# Patient Record
Sex: FEMALE | Race: OTHER | HISPANIC OR LATINO | Employment: FULL TIME | URBAN - METROPOLITAN AREA
[De-identification: names, ages, dates, MRNs, and addresses within clinical notes are randomized per-mention and may not be internally consistent; named-entity substitution may affect disease eponyms.]

---

## 2017-08-04 VITALS
OXYGEN SATURATION: 100 % | DIASTOLIC BLOOD PRESSURE: 89 MMHG | RESPIRATION RATE: 18 BRPM | BODY MASS INDEX: 51.19 KG/M2 | HEART RATE: 101 BPM | TEMPERATURE: 98.5 F | SYSTOLIC BLOOD PRESSURE: 149 MMHG | WEIGHT: 289 LBS

## 2017-08-05 ENCOUNTER — HOSPITAL ENCOUNTER (EMERGENCY)
Facility: HOSPITAL | Age: 32
Discharge: HOME | End: 2017-08-05
Attending: EMERGENCY MEDICINE | Admitting: EMERGENCY MEDICINE
Payer: COMMERCIAL

## 2017-08-05 DIAGNOSIS — S61.419A LACERATION OF HAND: Primary | ICD-10-CM

## 2017-08-05 PROCEDURE — 99282 EMERGENCY DEPT VISIT SF MDM: CPT

## 2017-08-05 RX ORDER — LIDOCAINE HYDROCHLORIDE AND EPINEPHRINE 10; 10 MG/ML; UG/ML
INJECTION, SOLUTION INFILTRATION; PERINEURAL
Status: COMPLETED
Start: 2017-08-05 | End: 2017-08-05

## 2017-08-05 RX ORDER — LIDOCAINE HYDROCHLORIDE AND EPINEPHRINE 10; 10 MG/ML; UG/ML
20 INJECTION, SOLUTION INFILTRATION; PERINEURAL ONCE
Status: COMPLETED | OUTPATIENT
Start: 2017-08-05 | End: 2017-08-05

## 2017-08-05 RX ADMIN — LIDOCAINE HYDROCHLORIDE AND EPINEPHRINE 20 ML: 10; 10 INJECTION, SOLUTION INFILTRATION; PERINEURAL at 01:15

## 2017-08-05 RX ADMIN — Medication 1 APPLICATION: at 00:28

## 2017-08-05 RX ADMIN — LIDOCAINE HYDROCHLORIDE,EPINEPHRINE BITARTRATE 20 ML: 10; .01 INJECTION, SOLUTION INFILTRATION; PERINEURAL at 01:15

## 2017-10-20 ENCOUNTER — ALLSCRIPTS OFFICE VISIT (OUTPATIENT)
Dept: OTHER | Facility: OTHER | Age: 32
End: 2017-10-20

## 2017-10-20 DIAGNOSIS — R63.5 ABNORMAL WEIGHT GAIN: ICD-10-CM

## 2017-10-20 DIAGNOSIS — N92.0 EXCESSIVE AND FREQUENT MENSTRUATION WITH REGULAR CYCLE: ICD-10-CM

## 2017-10-21 NOTE — PROGRESS NOTES
Assessment  1  Weight gain (783 1) (R63 5)   2  Menorrhagia (626 2) (N92 0)   3  Moderate persistent asthma without complication (927 86) (A46 52)   4  Morbid obesity, unspecified obesity type (278 01) (E66 01)   5  Pap smear for cervical cancer screening (V76 2) (Z12 4)    Plan  Asthma in adult, mild persistent, with acute exacerbation    · Symbicort 160-4 5 MCG/ACT Inhalation Aerosol   · Breo Ellipta 200-25 MCG/INH Inhalation Aerosol Powder Breath Activated; INHALE 1 PUFFS  Daily   · Ventolin  (90 Base) MCG/ACT Inhalation Aerosol Solution; INHALE 1 TO 2 PUFFS  EVERY 4 TO 6 HOURS AS NEEDED  Depression    · PARoxetine HCl - 20 MG Oral Tablet  Menorrhagia    · (1) FSH; Status:Active; Requested for:20Oct2017;    · (1) HCG QUANT; Status:Active; Requested for:20Oct2017;    · (1) LH (LEUTINIZING HORMONE); Status:Active; Requested LKP:[id number];   Pap smear for cervical cancer screening    · *1 - [facility] [facility]-Management  *  Status: Hold For - Scheduling  Requested for:  [id number]  Care Summary provided  : Yes  Weight gain    · (1) HEMOGLOBIN A1C; Status:Active; Requested for:20Oct2017;    · (1) TSH WITH FT4 REFLEX; Status:Active; Requested CGB:[id number];    · *1 - [facility]  *  Status: Hold For - Scheduling  Requested for:  04VLY6308  Care Summary provided  : Yes    Discussion/Summary  Discussion Summary:   Weight gain on diet and working out plan was advised to keep a food diary and working out diary LH, A1c, TSH, pregnancy test clinic for pap smear consult per pt request   well controlled current meds   Paxil     Counseling Documentation With Imm: The patient was counseled regarding  Medication SE Review and Pt Understands Tx: Possible side effects of new medications were reviewed with the patient/guardian today  The treatment plan was reviewed with the patient/guardian   The patient/guardian understands and agrees with the treatment plan      Chief Complaint  Chief Complaint Chronic Condition [facility]: Patient is here today for follow up of chronic conditions described in HPI  History of Present Illness  HPI: Pt reports that she has gained a significant amount of weight since December  She reports that her diet has not changed and in fact she is trying to diet and eat healthy and has been working out consistently  She reports that even though with all the diet and working out she has still gained weight instead of losing weight  She came for evaluation today inorder to find out if she can have some weight loss medications  reported that she was prescribed Paxil for her anxiety however she was taking it intermittently instead of everyday as prescribed  Pt reports that her anxiety symptoms are well controlled and she was only using the medication when she felt that she needed it  Pt reports that it is well controlled  She states that she does not have to use her Ventolin as often now that she is on [facility] Based Practices Required Assessment:   Pain Assessment   the patient states they have pain  The pain is located in the menstrual cramps  (on a scale of 0 to 10, the patient rates the pain at 4 )    Prefered Language is  english  Primary Language is  english  Review of Systems  Complete-Female:   Constitutional: no fever,-- not feeling poorly,-- no chills-- and-- not feeling tired  Eyes: no eyesight problems,-- no dryness of the eyes,-- no purulent discharge from the eyes-- and-- no itching of the eyes  ENT: no sore throat,-- no nasal discharge-- and-- no hoarseness  Cardiovascular: no chest pain-- and-- no palpitations  Respiratory: no shortness of breath,-- no cough,-- no wheezing-- and-- no shortness of breath during exertion  Gastrointestinal: no abdominal pain,-- no nausea,-- no vomiting,-- no constipation,-- no diarrhea-- and-- no blood in stools     Genitourinary: cramping, but-- no dysuria,-- no pelvic pain,-- no incontinence-- and-- no dysmenorrhea  Neurological: no numbness,-- no tingling-- and-- no fainting  Psychiatric: no anxiety-- and-- no depression  Endocrine: polyuria and polydipsia  ROS Reviewed:   ROS reviewed  Active Problems  1  Anxiety (300 00) (F41 9)   2  Asthma (493 90) (J45 909)   3  Asthma in adult, mild persistent, with acute exacerbation (493 92) (J45 31)   4  Depression (311) (F32 9)   5  Fatigue (780 79) (R53 83)   6  Menorrhagia (626 2) (N92 0)   7  Moderate persistent asthma without complication (285 44) (U45 42)   8  Morbid obesity, unspecified obesity type (278 01) (E66 01)   9  Social problem (V62 9) (Z65 9)    Surgical History  Surgical History Reviewed: The surgical history was reviewed and updated today  Family History  Child    1  No pertinent family history  Family History Reviewed: The family history was reviewed and updated today  Social History   · Never a smoker   · Social problem (V62 9) (Z65 9)  Social History Reviewed: The social history was reviewed and updated today  The social history was reviewed and is unchanged  Current Meds   1  Breo Ellipta 200-25 MCG/INH Inhalation Aerosol Powder Breath Activated; INHALE 1 PUFFS Daily; Therapy: 82Qxa7137 to (Evaluate:56Vyw6366)  Requested for: 92YBU3091; Last Rx:03Jan2017   Ordered   2  PARoxetine HCl - 20 MG Oral Tablet; TAKE 1 TABLET DAILY; Therapy: 31AKE8505 to (Evaluate:29Apr2017)  Requested for: 23YNE5058; Last Rx:31Kls3893   Ordered   3  Symbicort 160-4 5 MCG/ACT Inhalation Aerosol; INHALE 2 PUFFS TWICE DAILY  RINSE MOUTH   AFTER USE; Therapy: 36XDJ5837 to (Evaluate:18Tzb6221)  Requested for: 50DYT4291; Last Rx:03Jan2017   Ordered   4  Ventolin  (90 Base) MCG/ACT Inhalation Aerosol Solution; INHALE 1 TO 2 PUFFS EVERY 4   TO 6 HOURS AS NEEDED; Therapy: 89TCY8589 to (Evaluate:27Jun2017)  Requested for: 29Nov2016; Last Rx:29Nov2016   Ordered  Medication List Reviewed:    The medication list was reviewed and updated today  Allergies  1  No Known Drug Allergies    Vitals  Vital Signs    Recorded: 94DKT8367 11:58AM   Temperature 98 2 F   Heart Rate 76   Systolic 116   Diastolic 80   Height 5 ft 3 in   Weight 291 lb 7 14 oz   BMI Calculated 51 63   BSA Calculated 2 27     Physical Exam    Constitutional   General appearance: No acute distress, well appearing and well nourished  Eyes   Conjunctiva and lids: No swelling, erythema or discharge  Ears, Nose, Mouth, and Throat   External inspection of ears and nose: Normal     Oropharynx: Normal with no erythema, edema, exudate or lesions  Pulmonary   Respiratory effort: No increased work of breathing or signs of respiratory distress  Auscultation of lungs: Clear to auscultation  Cardiovascular   Auscultation of heart: Normal rate and rhythm, normal S1 and S2, without murmurs  Abdomen   Abdomen: Non-tender, no masses  Musculoskeletal   Gait and station: Normal     Neurologic   Cranial nerves: Cranial nerves 2-12 intact  Psychiatric   Orientation to person, place, and time: Normal     Mood and affect: Normal          Attending Note  Attending Note: Attending Note: I interviewed and examined the patient,-- I discussed the case with the Resident and reviewed the Resident's note,-- I supervised the Resident-- and-- I agree with the Resident management plan as it was presented to me  Level of Participation: I was present in clinic and examined the patient  Patient's History: patient here with main concern being weight gain  states she is working out and following a diet but continues to gain weight  would like to try weight loss meds  getting  next month  did have tubal ligation , and took 2 pregnancy tests at home which were negative  Key Parts of the Exam: obese female in NAD  heart RRR lungs clear with no wheeze  Diagnosis and Plan: agree with rule out medical cause  told her goal to lose 1-2 lbs each month and continue exercise and diet   if predm or dm , may help to try metformin  would like to see endo  I agree with the Resident's note        Signatures   Electronically signed by : Donta Wolf DO; Oct 20 2017 12:40PM EST                       (Author)    Electronically signed by : Bev Rodriguez DO; Oct 20 2017 12:46PM EST                       (Co-participant)

## 2017-10-23 ENCOUNTER — TRANSCRIBE ORDERS (OUTPATIENT)
Dept: LAB | Facility: HOSPITAL | Age: 32
End: 2017-10-23

## 2017-10-23 ENCOUNTER — APPOINTMENT (OUTPATIENT)
Dept: LAB | Facility: HOSPITAL | Age: 32
End: 2017-10-23
Payer: COMMERCIAL

## 2017-10-23 DIAGNOSIS — R63.5 ABNORMAL WEIGHT GAIN: ICD-10-CM

## 2017-10-23 DIAGNOSIS — N92.0 EXCESSIVE AND FREQUENT MENSTRUATION WITH REGULAR CYCLE: ICD-10-CM

## 2017-10-23 LAB
B-HCG SERPL-ACNC: <2 MIU/ML
EST. AVERAGE GLUCOSE BLD GHB EST-MCNC: 123 MG/DL
FSH SERPL-ACNC: 2 MIU/ML
HBA1C MFR BLD: 5.9 % (ref 4.2–6.3)
LH SERPL-ACNC: 3.6 MIU/ML
TSH SERPL DL<=0.05 MIU/L-ACNC: 1.65 UIU/ML (ref 0.36–3.74)

## 2017-10-23 PROCEDURE — 83001 ASSAY OF GONADOTROPIN (FSH): CPT

## 2017-10-23 PROCEDURE — 83036 HEMOGLOBIN GLYCOSYLATED A1C: CPT

## 2017-10-23 PROCEDURE — 84702 CHORIONIC GONADOTROPIN TEST: CPT

## 2017-10-23 PROCEDURE — 36415 COLL VENOUS BLD VENIPUNCTURE: CPT

## 2017-10-23 PROCEDURE — 84443 ASSAY THYROID STIM HORMONE: CPT

## 2017-10-23 PROCEDURE — 83002 ASSAY OF GONADOTROPIN (LH): CPT

## 2018-01-13 VITALS
BODY MASS INDEX: 51.64 KG/M2 | SYSTOLIC BLOOD PRESSURE: 122 MMHG | DIASTOLIC BLOOD PRESSURE: 80 MMHG | TEMPERATURE: 98.2 F | WEIGHT: 291.45 LBS | HEART RATE: 76 BPM | HEIGHT: 63 IN

## 2018-01-16 NOTE — MISCELLANEOUS
Reason For Visit  Reason For Visit Free Text Note Form: Assistance with [address] referral and Community Resources     Case Management Documentation [facility]:   Information obtained from the patient and medical record  Other needs and concerns include psych and Anxiety  Patient's financial status employed  She is also dealing with additional issues such as lack of support, chronic/terminal disease and Asthma  Action Plan: [facility] [address]  plan reviewed  Progress Note  Soren met with this pt at MD request to assist with [address] referral and assistance with community resources  Pt has been recently hospitalized for exacerbation of her asthma and anxiety  Pt does relate great stress due to extreme financial constraints and housing concerns  Pt currently reside next to her grandparents who she provides support and assistance  In addition, she works full time and is sole support to for her 6 children ages 13,12,10,8,3,&2  Pt's does have her mother and an adult brother but they are also providing 25 assistance to the grandparents and can not provide housing  SOREN has provided pt resources for the [employer]  to assist with [address] services until she can obtain insurance  Pt is working with [employer] to reestablish her [place] coverage  SW has also reviewed some housing resources as pt relates she and her children will need to vacate her present apt in about 2 months  Info being sent to pt via the mail re: housing application and services from [facility]  Pt asked to reschedule with SOREN for follow up  Active Problems    1  Anxiety (300 00) (F41 9)   2  Asthma in adult, mild persistent, with acute exacerbation (493 92) (J45 31)   3  Morbid obesity, unspecified obesity type (278 01) (E66 01)   4  Social problem (V62 9) (Z65 9)   5  Uncomplicated severe persistent asthma (493 90) (J45 50)    Current Meds   1  Advair Diskus 250-50 MCG/DOSE Inhalation Aerosol Powder Breath Activated; INHALE 1   PUFF TWICE DAILY;    Therapy: 19JWV5655 to (Evaluate:49Ags6448)  Requested for: 30Jun2016; Last   Rx:30Jun2016 Ordered   2  Ventolin  (90 Base) MCG/ACT Inhalation Aerosol Solution; INHALE 1 TO 2 PUFFS   EVERY 4 TO 6 HOURS AS NEEDED; Therapy: 01FUS0752 to (Evaluate:26Jan2017)  Requested for: 05EIN3364; Last   Rx:30Jun2016 Ordered    Allergies    1   No Known Drug Allergies    Signatures   Electronically signed by : Kylie Jones LCSW; Jun 30 2016  1:07PM EST                       (Author)

## 2018-03-27 ENCOUNTER — OFFICE VISIT (OUTPATIENT)
Dept: INTERNAL MEDICINE CLINIC | Facility: CLINIC | Age: 33
End: 2018-03-27
Payer: COMMERCIAL

## 2018-03-27 VITALS
SYSTOLIC BLOOD PRESSURE: 130 MMHG | TEMPERATURE: 98.8 F | WEIGHT: 293 LBS | BODY MASS INDEX: 50.02 KG/M2 | DIASTOLIC BLOOD PRESSURE: 98 MMHG | HEIGHT: 64 IN | HEART RATE: 76 BPM

## 2018-03-27 DIAGNOSIS — E66.01 MORBID OBESITY DUE TO EXCESS CALORIES (HCC): Primary | ICD-10-CM

## 2018-03-27 DIAGNOSIS — R73.03 PREDIABETES: ICD-10-CM

## 2018-03-27 DIAGNOSIS — J45.20 MILD INTERMITTENT ASTHMA WITHOUT COMPLICATION: Chronic | ICD-10-CM

## 2018-03-27 PROCEDURE — 99213 OFFICE O/P EST LOW 20 MIN: CPT | Performed by: INTERNAL MEDICINE

## 2018-03-27 PROCEDURE — 3008F BODY MASS INDEX DOCD: CPT | Performed by: INTERNAL MEDICINE

## 2018-03-27 NOTE — LETTER
March 27, 2018     Patient: Katie Johnson   YOB: 1985   Date of Visit: 3/27/2018       To Whom it May Concern:    Jennifer Mitchell is under my professional care  She was seen in my office on 3/27/2018  She may return to work on 3/28/18  If you have any questions or concerns, please don't hesitate to call           Sincerely,          Garima Lester DO        CC: No Recipients

## 2018-05-17 ENCOUNTER — HOSPITAL ENCOUNTER (EMERGENCY)
Facility: HOSPITAL | Age: 33
Discharge: HOME | End: 2018-05-17
Attending: EMERGENCY MEDICINE
Payer: COMMERCIAL

## 2018-05-17 VITALS
SYSTOLIC BLOOD PRESSURE: 152 MMHG | HEART RATE: 80 BPM | WEIGHT: 260 LBS | TEMPERATURE: 98.1 F | OXYGEN SATURATION: 97 % | BODY MASS INDEX: 44.63 KG/M2 | DIASTOLIC BLOOD PRESSURE: 79 MMHG | RESPIRATION RATE: 20 BRPM

## 2018-05-17 DIAGNOSIS — J06.9 URI (UPPER RESPIRATORY INFECTION): Primary | ICD-10-CM

## 2018-05-17 PROCEDURE — 99283 EMERGENCY DEPT VISIT LOW MDM: CPT

## 2018-05-17 RX ORDER — ACETAMINOPHEN 325 MG/1
650 TABLET ORAL ONCE
Status: COMPLETED | OUTPATIENT
Start: 2018-05-17 | End: 2018-05-17

## 2018-05-17 RX ADMIN — ACETAMINOPHEN 650 MG: 325 TABLET, FILM COATED ORAL at 02:18

## 2018-05-17 RX ADMIN — DEXAMETHASONE SODIUM PHOSPHATE 10 MG: 10 INJECTION, SOLUTION INTRAMUSCULAR; INTRAVENOUS at 02:19

## 2018-05-17 RX ADMIN — LIDOCAINE HYDROCHLORIDE 10 ML: 20 SOLUTION ORAL; TOPICAL at 02:20

## 2018-05-17 NOTE — ED ATTENDING ATTESTATION
Angela LO DO, saw and evaluated the patient  I have discussed the patient with the resident/non-physician practitioner and agree with the resident's/non-physician practitioner's findings, Plan of Care, and MDM as documented in the resident's/non-physician practitioner's note, except where noted  All available labs and Radiology studies were reviewed  At this point I agree with the current assessment done in the Emergency Department  I have conducted an independent evaluation of this patient a history and physical is as follows:    20-year-old female presents with 2 days of URI symptoms  Patient complains of congestion, rhinorrhea, bilateral ear pain and sore throat  No fevers no known medical problems  On exam-no acute distress, TMs clear bilaterally, oropharynx mild erythema without exudates, heart regular, lungs clear    Plan-suspect viral URI, Flonase, Sudafed, Decadron    Critical Care Time  CritCare Time    Procedures

## 2018-05-17 NOTE — DISCHARGE INSTRUCTIONS
Pharyngitis   WHAT YOU NEED TO KNOW:   Pharyngitis, or sore throat, is inflammation of the tissues and structures in your pharynx (throat)  Pharyngitis is most often caused by bacteria  It may also be caused by a cold or flu virus  Other causes include smoking, allergies, or acid reflux  DISCHARGE INSTRUCTIONS:   Call 911 for any of the following:   · You have trouble breathing or swallowing because your throat is swollen or sore  Return to the emergency department if:   · You are drooling because it hurts too much to swallow  · Your fever is higher than 102? F (39?C) or lasts longer than 3 days  · You are confused  · You taste blood in your throat  Contact your healthcare provider if:   · Your throat pain gets worse  · You have a painful lump in your throat that does not go away after 5 days  · Your symptoms do not improve after 5 days  · You have questions or concerns about your condition or care  Medicines:  Viral pharyngitis will go away on its own without treatment  Your sore throat should start to feel better in 3 to 5 days for both viral and bacterial infections  You may need any of the following:  · NSAIDs , such as ibuprofen, help decrease swelling, pain, and fever  NSAIDs can cause stomach bleeding or kidney problems in certain people  If you take blood thinner medicine, always ask your healthcare provider if NSAIDs are safe for you  Always read the medicine label and follow directions  · Acetaminophen  decreases pain and fever  It is available without a doctor's order  Ask how much to take and how often to take it  Follow directions  Acetaminophen can cause liver damage if not taken correctly  · Take your medicine as directed  Contact your healthcare provider if you think your medicine is not helping or if you have side effects  Tell him or her if you are allergic to any medicine  Keep a list of the medicines, vitamins, and herbs you take   Include the amounts, and when and why you take them  Bring the list or the pill bottles to follow-up visits  Carry your medicine list with you in case of an emergency  Manage your symptoms:   · Gargle salt water  Mix ¼ teaspoon salt in an 8 ounce glass of warm water and gargle  This may help decrease swelling in your throat  · Drink liquids as directed  You may need to drink more liquids than usual  Liquids may help soothe your throat and prevent dehydration  Ask how much liquid to drink each day and which liquids are best for you  · Use a cool-steam humidifier  to help moisten the air in your room and calm your cough  · Soothe your throat  with cough drops, ice, soft foods, or popsicles  Prevent the spread of pharyngitis:  Cover your mouth and nose when you cough or sneeze  Do not share food or drinks  Wash your hands often  Use soap and water  If soap and water are unavailable, use an alcohol based hand   Follow up with your healthcare provider as directed:  Write down your questions so you remember to ask them during your visits  © 2017 [employer] Information is for End User's use only and may not be sold, redistributed or otherwise used for commercial purposes  All illustrations and images included in CareNotes® are the copyrighted property of [employer]  or [employer]  The above information is an  only  It is not intended as medical advice for individual conditions or treatments  Talk to your doctor, nurse or pharmacist before following any medical regimen to see if it is safe and effective for you

## 2018-05-17 NOTE — ED PROVIDER NOTES
History  Chief Complaint   Patient presents with    Earache     pt c/o bilateral earache into neck since last night      59-year-old female presenting for evaluation of 2 days of URI symptoms  Patient complains of congestion, rhinorrhea, bilateral ear pain, sore throat and cough  She reports that her sore throat is what is bothering her the most   She has been able to tolerate po  Pain with swallowing, no difficulty swallowing  Reports a cough, occasionally productive of yellow/green sputum  Denies any fevers, neck pain/stiffness, abdominal pain, nausea, vomiting, changes in stool or urinary complaints  No known sick contacts  History of asthma  Nonsmoker  A/P:  59-year-old female with URI, symptomatic treatment, PCP follow-up            Prior to Admission Medications   Prescriptions Last Dose Informant Patient Reported? Taking? BREO ELLIPTA 200-25 MCG/INH inhaler 2018 at Unknown time  No Yes   Sig: Inhale 1 puff daily   VENTOLIN  (90 Base) MCG/ACT inhaler 2018 at Unknown time  No Yes   Sig: Inhale 2 puffs every 4 (four) hours as needed for wheezing   metFORMIN (GLUCOPHAGE) 500 mg tablet 2018 at Unknown time  No Yes   Sig: Take 1 tablet (500 mg total) by mouth 2 (two) times a day with meals      Facility-Administered Medications: None       Past Medical History:   Diagnosis Date    Asthma        Past Surgical History:   Procedure Laterality Date     SECTION         Family History   Problem Relation Age of Onset    No Known Problems Other     Arthritis Mother     Fibromyalgia Mother     Diabetes Family     Cancer Family     Heart disease Family      I have reviewed and agree with the history as documented  Social History   Substance Use Topics    Smoking status: Never Smoker    Smokeless tobacco: Never Used    Alcohol use No        Review of Systems   Constitutional: Negative for chills, fever and unexpected weight change     HENT: Positive for congestion, ear pain, rhinorrhea, sinus pressure and sore throat  Eyes: Negative for pain and visual disturbance  Respiratory: Positive for cough  Negative for shortness of breath  Cardiovascular: Negative for chest pain and leg swelling  Gastrointestinal: Negative for abdominal pain, constipation, diarrhea, nausea and vomiting  Endocrine: Negative for polydipsia, polyphagia and polyuria  Genitourinary: Negative for dysuria, frequency, hematuria and urgency  Musculoskeletal: Negative for back pain, myalgias and neck pain  Skin: Negative for color change and rash  Allergic/Immunologic: Negative for environmental allergies and immunocompromised state  Neurological: Negative for dizziness, weakness, light-headedness, numbness and headaches  Hematological: Negative for adenopathy  Does not bruise/bleed easily  Psychiatric/Behavioral: Negative for agitation and confusion  All other systems reviewed and are negative  Physical Exam  ED Triage Vitals [05/17/18 0159]   Temperature Pulse Respirations Blood Pressure SpO2   98 1 °F (36 7 °C) 80 20 152/79 97 %      Temp Source Heart Rate Source Patient Position - Orthostatic VS BP Location FiO2 (%)   Tympanic Monitor Sitting Left arm --      Pain Score       Worst Possible Pain           Orthostatic Vital Signs  Vitals:    05/17/18 0159   BP: 152/79   Pulse: 80   Patient Position - Orthostatic VS: Sitting       Physical Exam   Constitutional: She is oriented to person, place, and time  She appears well-developed and well-nourished  HENT:   Head: Normocephalic and atraumatic  Right Ear: Tympanic membrane and external ear normal    Left Ear: Tympanic membrane and external ear normal    Nose: Mucosal edema and rhinorrhea present  Mouth/Throat: Uvula is midline, oropharynx is clear and moist and mucous membranes are normal  No oral lesions  No trismus in the jaw  No dental abscesses or uvula swelling   No oropharyngeal exudate, posterior oropharyngeal edema, posterior oropharyngeal erythema or tonsillar abscesses  Eyes: Conjunctivae and EOM are normal    Neck: Normal range of motion  Neck supple  Cardiovascular: Normal rate, regular rhythm, normal heart sounds and intact distal pulses  Pulmonary/Chest: Effort normal and breath sounds normal  No respiratory distress  She has no wheezes  She has no rales  She exhibits no tenderness  Abdominal: Soft  She exhibits no distension  There is no tenderness  There is no rebound and no guarding  Musculoskeletal: She exhibits no edema or deformity  Neurological: She is alert and oriented to person, place, and time  She exhibits normal muscle tone  Coordination normal    Skin: Skin is warm and dry  No rash noted  Psychiatric: She has a normal mood and affect  Judgment and thought content normal    Nursing note and vitals reviewed  ED Medications  Medications   dexamethasone 10 mg/mL oral liquid 10 mg 1 mL (10 mg Oral Given 5/17/18 0219)   acetaminophen (TYLENOL) tablet 650 mg (650 mg Oral Given 5/17/18 0218)   lidocaine viscous (XYLOCAINE) 2 % mucosal solution 10 mL (10 mL Swish & Swallow Given 5/17/18 0220)       Diagnostic Studies  Results Reviewed     None                 No orders to display         Procedures  Procedures      Phone Consults  ED Phone Contact    ED Course                               MDM  Number of Diagnoses or Management Options  URI (upper respiratory infection):   Diagnosis management comments: 27 yo F with URI, symptomatic tx    CritCare Time    Disposition  Final diagnoses:   URI (upper respiratory infection)     Time reflects when diagnosis was documented in both MDM as applicable and the Disposition within this note     Time User Action Codes Description Comment    5/17/2018  2:14 AM Glynn ENRIQUEZ Add [J06 9] URI (upper respiratory infection)       ED Disposition     ED Disposition Condition Comment    Discharge  [facility] to [facility]/self care      Condition at discharge: Stable        Follow-up Information     Follow up With Specialties Details Why [address],  Internal Medicine   [address] [facility] [address]  [phone]          Take 400 mg Ibuprofen every 6 hours and 650 mg Tylenol every 8 hours  Return to ED if you have any new/worsening symptoms        Discharge Medication List as of 5/17/2018  2:15 AM      CONTINUE these medications which have NOT CHANGED    Details   BREO ELLIPTA 200-25 MCG/INH inhaler Inhale 1 puff daily, Starting Tue 3/27/2018, Normal      metFORMIN (GLUCOPHAGE) 500 mg tablet Take 1 tablet (500 mg total) by mouth 2 (two) times a day with meals, Starting Tue 3/27/2018, Normal      VENTOLIN  (90 Base) MCG/ACT inhaler Inhale 2 puffs every 4 (four) hours as needed for wheezing, Starting Tue 3/27/2018, Normal           No discharge procedures on file  ED Provider  Attending physically available and evaluated Zunilda Butlerkyra LO managed the patient along with the ED Attending      Electronically Signed by         Kelly Wray DO  05/18/18 9306

## 2018-05-23 ENCOUNTER — OFFICE VISIT (OUTPATIENT)
Dept: OBGYN CLINIC | Facility: CLINIC | Age: 33
End: 2018-05-23
Payer: COMMERCIAL

## 2018-05-23 VITALS
SYSTOLIC BLOOD PRESSURE: 128 MMHG | BODY MASS INDEX: 51.91 KG/M2 | DIASTOLIC BLOOD PRESSURE: 74 MMHG | HEIGHT: 63 IN | WEIGHT: 293 LBS

## 2018-05-23 DIAGNOSIS — Z11.3 SCREENING FOR STD (SEXUALLY TRANSMITTED DISEASE): ICD-10-CM

## 2018-05-23 DIAGNOSIS — N92.6 IRREGULAR MENSTRUAL CYCLE: ICD-10-CM

## 2018-05-23 DIAGNOSIS — Z01.419 ENCOUNTER FOR GYNECOLOGICAL EXAMINATION WITHOUT ABNORMAL FINDING: Primary | ICD-10-CM

## 2018-05-23 PROCEDURE — 87491 CHLMYD TRACH DNA AMP PROBE: CPT | Performed by: NURSE PRACTITIONER

## 2018-05-23 PROCEDURE — 99385 PREV VISIT NEW AGE 18-39: CPT | Performed by: NURSE PRACTITIONER

## 2018-05-23 PROCEDURE — G0145 SCR C/V CYTO,THINLAYER,RESCR: HCPCS | Performed by: NURSE PRACTITIONER

## 2018-05-23 PROCEDURE — 87624 HPV HI-RISK TYP POOLED RSLT: CPT | Performed by: NURSE PRACTITIONER

## 2018-05-23 PROCEDURE — 87591 N.GONORRHOEAE DNA AMP PROB: CPT | Performed by: NURSE PRACTITIONER

## 2018-05-23 NOTE — PROGRESS NOTES
Assessment / Plan    1  Encounter for gynecological examination without abnormal finding  Normal well woman exam  Pap with hpv obtained    - Liquid-based pap, screening    2  Screening for STD (sexually transmitted disease)  Per patient request    - Chlamydia/GC amplified DNA by PCR    3  Irregular menstrual cycle  Explained that Q 1-2 months, while irregular, is not extreme enough to warrant management  Discussed impact of excess weight on menstrual cycles  Recommend Weight Watcher's  RV as needed if cycles become progressively more irregular  Andre Evans is a 28 y o  female who presents for her annual gynecologic exam   NEW PATIENT    Patient with morbid obesity  10/2017 LH 3 6, FSH 2 0, hcg negative, TSH normal  BC: tubal ligation    Last pap: ~ 6 years ago; no h/o abnormal  h/o twins , spontaneous, no fertility treatment    Every 1-2 month periods; started to be irregular 6-7 months ago   + dysmenorrhea  Duration 5 days; heavy x 3 days  Current contraception: tubal ligation  History of abnormal Pap smear: no  Family history of breast,uterine, ovarian or colon cancer: yes - mgm breast cancer    Taking metformin for pre-diabetes  Walking one mile a day  c/o hair loss      Menstrual History:  OB History      Para Term  AB Living    7 6 6   1 7    SAB TAB Ectopic Multiple Live Births    1     1           Menarche age: 6  Patient's last menstrual period was 2018  Period Pattern: (!) Irregular  Menstrual Flow: Heavy    The following portions of the patient's history were reviewed and updated as appropriate: allergies, current medications, past family history, past medical history, past social history, past surgical history and problem list     Review of Systems      Review of Systems   Constitutional: Negative for chills and fever     Gastrointestinal: Negative for abdominal distention, abdominal pain, blood in stool, constipation, diarrhea, nausea and vomiting  Genitourinary: Positive for menstrual problem (irregular cycles)  Negative for difficulty urinating, dysuria, frequency, genital sores, hematuria, pelvic pain, urgency, vaginal bleeding and vaginal discharge  Breasts:  Negative for skin changes, dimpling, asymmetry, nipple discharge, redness, tenderness or palpable masses    Objective      /74   Ht 5' 3" (1 6 m)   Wt (!) 137 kg (301 lb 6 4 oz)   LMP 05/16/2018   BMI 53 39 kg/m²      Physical Exam   Constitutional: She appears well-developed and well-nourished  No distress  Neck: Neck supple  No thyromegaly present  Pulmonary/Chest: Right breast exhibits no inverted nipple, no mass, no nipple discharge, no skin change and no tenderness  Left breast exhibits no inverted nipple, no mass, no nipple discharge, no skin change and no tenderness  Breasts are symmetrical    Abdominal: Soft  Normal appearance  She exhibits no mass  There is no tenderness  There is no CVA tenderness  Genitourinary: Rectum normal and uterus normal  Rectal exam shows guaiac negative stool  No labial fusion  There is no rash, tenderness, lesion or injury on the right labia  There is no rash, tenderness, lesion or injury on the left labia  Uterus is not enlarged and not tender  Cervix exhibits no motion tenderness, no discharge and no friability  Right adnexum displays no mass, no tenderness and no fullness  Left adnexum displays no mass, no tenderness and no fullness  No erythema, tenderness or bleeding in the vagina  No foreign body in the vagina  No signs of injury around the vagina  No vaginal discharge found  Genitourinary Comments:   Exam limited by habitus     Lymphadenopathy:     She has no cervical adenopathy  She has no axillary adenopathy  Right: No inguinal and no supraclavicular adenopathy present  Left: No inguinal and no supraclavicular adenopathy present  Neurological: She is alert  She is not disoriented     Skin: Skin is warm, dry and intact  Psychiatric: She has a normal mood and affect   Her behavior is normal

## 2018-05-23 NOTE — PATIENT INSTRUCTIONS
Follow up with family doctor  Ask her about endocrinology consult  For now recommend Weight Watchers

## 2018-05-24 LAB
CHLAMYDIA DNA CVX QL NAA+PROBE: NORMAL
N GONORRHOEA DNA GENITAL QL NAA+PROBE: NORMAL

## 2018-05-29 LAB
HPV RRNA GENITAL QL NAA+PROBE: NORMAL
LAB AP GYN PRIMARY INTERPRETATION: NORMAL
Lab: NORMAL

## 2018-07-29 ENCOUNTER — APPOINTMENT (EMERGENCY)
Dept: RADIOLOGY | Facility: HOSPITAL | Age: 33
End: 2018-07-29
Payer: COMMERCIAL

## 2018-07-29 ENCOUNTER — HOSPITAL ENCOUNTER (EMERGENCY)
Facility: HOSPITAL | Age: 33
Discharge: HOME | End: 2018-07-30
Attending: EMERGENCY MEDICINE | Admitting: EMERGENCY MEDICINE
Payer: COMMERCIAL

## 2018-07-29 VITALS
WEIGHT: 260 LBS | TEMPERATURE: 98 F | HEART RATE: 84 BPM | OXYGEN SATURATION: 97 % | DIASTOLIC BLOOD PRESSURE: 73 MMHG | RESPIRATION RATE: 18 BRPM | SYSTOLIC BLOOD PRESSURE: 129 MMHG | BODY MASS INDEX: 46.06 KG/M2

## 2018-07-29 DIAGNOSIS — M25.561 RIGHT KNEE PAIN: Primary | ICD-10-CM

## 2018-07-29 PROCEDURE — 73564 X-RAY EXAM KNEE 4 OR MORE: CPT

## 2018-07-29 RX ORDER — ACETAMINOPHEN 325 MG/1
975 TABLET ORAL ONCE
Status: COMPLETED | OUTPATIENT
Start: 2018-07-29 | End: 2018-07-29

## 2018-07-29 RX ADMIN — ACETAMINOPHEN 975 MG: 325 TABLET, FILM COATED ORAL at 23:58

## 2018-07-30 PROCEDURE — 99283 EMERGENCY DEPT VISIT LOW MDM: CPT

## 2018-07-30 RX ORDER — NAPROXEN 500 MG/1
500 TABLET ORAL 2 TIMES DAILY WITH MEALS
Qty: 14 TABLET | Refills: 0 | Status: SHIPPED | OUTPATIENT
Start: 2018-07-30 | End: 2021-03-23

## 2018-07-30 NOTE — ED PROVIDER NOTES
History  Chief Complaint   Patient presents with    Knee Injury     pt states that yesterday she twisted her knee and now she can barely put weight on it  71-year-old previously healthy female presents for evaluation of right knee injury  Patient states that she was getting out of bed earlier today when she twisted her right knee  She felt immediate pain to her anterior right knee below her patella, she states that she had difficulty ambulating on the knee secondary to pain  The pain was nonradiating and without any associated numbness or tingling in her extremity  No similar injuries in the past   She took 3 Advils without any relief  Prior to Admission Medications   Prescriptions Last Dose Informant Patient Reported? Taking? BREO ELLIPTA 200-25 MCG/INH inhaler   No Yes   Sig: Inhale 1 puff daily   VENTOLIN  (90 Base) MCG/ACT inhaler   No Yes   Sig: Inhale 2 puffs every 4 (four) hours as needed for wheezing   metFORMIN (GLUCOPHAGE) 500 mg tablet   No Yes   Sig: Take 1 tablet (500 mg total) by mouth 2 (two) times a day with meals      Facility-Administered Medications: None       Past Medical History:   Diagnosis Date    Asthma        Past Surgical History:   Procedure Laterality Date     SECTION         Family History   Problem Relation Age of Onset    No Known Problems Other     Arthritis Mother     Fibromyalgia Mother     Diabetes Family     Cancer Family     Heart disease Family      I have reviewed and agree with the history as documented  Social History   Substance Use Topics    Smoking status: Never Smoker    Smokeless tobacco: Never Used    Alcohol use No        Review of Systems   Constitutional: Negative for chills and fever  HENT: Negative for rhinorrhea and sore throat  Respiratory: Negative for cough  Cardiovascular: Negative for chest pain and palpitations  Gastrointestinal: Negative for abdominal pain, nausea and vomiting     Genitourinary: Negative for dysuria, frequency and urgency  Musculoskeletal:        Right knee pain   Neurological: Negative for weakness, light-headedness and headaches  Physical Exam  ED Triage Vitals [07/29/18 2230]   Temperature Pulse Respirations Blood Pressure SpO2   98 °F (36 7 °C) 84 18 129/73 97 %      Temp Source Heart Rate Source Patient Position - Orthostatic VS BP Location FiO2 (%)   Oral Monitor Lying Right arm --      Pain Score       8           Orthostatic Vital Signs  Vitals:    07/29/18 2230   BP: 129/73   Pulse: 84   Patient Position - Orthostatic VS: Lying       Physical Exam   Constitutional: She is oriented to person, place, and time  She appears well-developed and well-nourished  Obese female in no acute distress   HENT:   Head: Normocephalic and atraumatic  Cardiovascular: Normal rate and regular rhythm  Exam reveals no gallop and no friction rub  No murmur heard  Pulmonary/Chest: Effort normal  She has no wheezes  She has no rales  She exhibits no tenderness  Abdominal: Soft  She exhibits no distension and no mass  There is no rebound and no guarding  Musculoskeletal:   Tenderness to palpation in the anterior knee, below the patella, no obvious swelling, the exam is limited secondary to patient's habitus  Range of motion decreased secondary to pain especially when flexing the knee, distally extremities neurovascularly intact  No obvious ligamentous laxity on exam   Neurological: She is alert and oriented to person, place, and time  Skin: Skin is warm and dry  Psychiatric: She has a normal mood and affect  Nursing note and vitals reviewed        ED Medications  Medications   acetaminophen (TYLENOL) tablet 975 mg (975 mg Oral Given 7/29/18 0336)       Diagnostic Studies  Results Reviewed     None                 XR knee 4+ vw right injury   ED Interpretation by Daren Mayorga MD (07/30 9587)   No fracture, unknown metallic foreign body noted on x-ray Procedures  Procedures      Phone Consults  ED Phone Contact    ED Course  ED Course as of Jul 30 0135   Mon Jul 30, 2018   0026 The was not abnormality on the patient's x-ray there appears to be a metallic foreign body, there is no history concerning for foreign body in the region, there is no overlying skin defects, patient will follow up with primary care and Orthopedics for further care                                [facility]  Number of Diagnoses or Management Options  Diagnosis management comments: 26-year-old female presents for evaluation of right knee pain, will obtain x-rays, will treat symptomatically with Tylenol and knee immobilizer, crutches, patient will follow up with Orthopedics for further care    CritCare Time    Disposition  Final diagnoses:   Right knee pain     Time reflects when diagnosis was documented in both MDM as applicable and the Disposition within this note     Time User Action Codes Description Comment    7/30/2018 12:25 AM Fabby Arriola Add [M25 561] Right knee pain       ED Disposition     ED Disposition Condition Comment    Discharge  [address] discharge to home/self care      Condition at discharge: Stable        Follow-up Information     Follow up With Specialties Details Why Contact Info Additional [address] Emergency Department Emergency Medicine  If symptoms worsen [address]  [phone]  ED, [address] [place] [address], [address] [place] [address] Ave [address], DO Internal Medicine Schedule an appointment as soon as possible for a visit  [address] Connie [address] 26 Surgery In 1 week  [address] [id number] [phone]           Discharge Medication List as of 7/30/2018 12:26 AM      START taking these medications    Details   naproxen (NAPROSYN) 500 mg tablet Take 1 tablet (500 mg total) by mouth 2 (two) times a day with meals, Starting Mon 7/30/2018, Print         CONTINUE these medications which have NOT CHANGED    Details   BREO ELLIPTA 200-25 MCG/INH inhaler Inhale 1 puff daily, Starting Tue 3/27/2018, Normal      metFORMIN (GLUCOPHAGE) 500 mg tablet Take 1 tablet (500 mg total) by mouth 2 (two) times a day with meals, Starting Tue 3/27/2018, Normal      VENTOLIN  (90 Base) MCG/ACT inhaler Inhale 2 puffs every 4 (four) hours as needed for wheezing, Starting Tue 3/27/2018, Normal           No discharge procedures on file  ED Provider  Attending physically available and evaluated [place] Pio  I managed the patient along with the ED Attending      Electronically Signed by         Juana White MD  07/30/18 8792

## 2018-07-30 NOTE — ED ATTENDING ATTESTATION
Cynthia Pillai MD, saw and evaluated the patient  I have discussed the patient with the resident/non-physician practitioner and agree with the resident's/non-physician practitioner's findings, Plan of Care, and MDM as documented in the resident's/non-physician practitioner's note, except where noted  All available labs and Radiology studies were reviewed  At this point I agree with the current assessment done in the Emergency Department  I have conducted an independent evaluation of this patient including a focused history of:    Emergency Department Note- Rustam Garland 28 y o  female MRN: [id number]    Unit/Bed#: ED 24 Encounter: [id number]    Rustam Garland is a 28 y o  female who presents with   Chief Complaint   Patient presents with    Knee Injury     pt states that yesterday she twisted her knee and now she can barely put weight on it  History of Present Illness   HPI:  Rustam Garland is a 28 y o  female who presents for evaluation of:  Acute lateral right knee pain  Patient twisted knee after getting out of bed  Review of Systems   Constitutional: Negative for fatigue and fever  Musculoskeletal: Negative for gait problem and joint swelling  All other systems reviewed and are negative        Historical Information   Past Medical History:   Diagnosis Date    Asthma      Past Surgical History:   Procedure Laterality Date     SECTION       Social History   History   Alcohol Use No     History   Drug Use No     History   Smoking Status    Never Smoker   Smokeless Tobacco    Never Used     Family History: non-contributory    Meds/Allergies   all medications and allergies reviewed  No Known Allergies    Objective   First Vitals:   Blood Pressure: 129/73 (18)  Pulse: 84 (18)  Temperature: 98 °F (36 7 °C) (18)  Temp Source: Oral (18)  Respirations: 18 (18)  Weight - Scale: 118 kg (260 lb) (18 )  SpO2: 97 % (18)    Current Vitals:   Blood Pressure: 129/73 (18)  Pulse: 84 (18)  Temperature: 98 °F (36 7 °C) (18)  Temp Source: Oral (18)  Respirations: 18 (18)  Weight - Scale: 118 kg (260 lb) (18)  SpO2: 97 % (18)    No intake or output data in the 24 hours ending 18    Invasive Devices          No matching active lines, drains, or airways          Physical Exam   Constitutional: She is oriented to person, place, and time  She appears well-developed and well-nourished  HENT:   Head: Normocephalic and atraumatic  Pulmonary/Chest: Effort normal and breath sounds normal    Abdominal: Soft  Bowel sounds are normal    Musculoskeletal: She exhibits tenderness  She exhibits no edema or deformity  Tender right lateral knee   Neurological: She is alert and oriented to person, place, and time  Skin: Skin is warm and dry  Psychiatric: She has a normal mood and affect  Her behavior is normal  Judgment and thought content normal    Nursing note and vitals reviewed  Medical Decision Makin  Acute right knee pain xray r/o fx    No results found for this or any previous visit (from the past 36 hour(s))  XR knee 4+ vw right injury    (Results Pending)         Portions of the record may have been created with voice recognition software  Occasional wrong word or "sound a like" substitutions may have occurred due to the inherent limitations of voice recognition software  Read the chart carefully and recognize, using context, where substitutions have occurred

## 2018-07-30 NOTE — DISCHARGE INSTRUCTIONS
Please follow-up with the primary care provider and Orthopedics for further care, if symptoms worsen please return to the emergency department  Knee Pain   WHAT YOU NEED TO KNOW:   Knee pain may start suddenly, or it may be a long-term problem  You may have pain on the side, front, or back of your knee  You may have knee stiffness and swelling  You may hear popping sounds or feel like your knee is giving way or locking up as you walk  You may feel pain when you sit, stand, walk, or climb up and down stairs  Knee pain can be caused by conditions such as obesity, inflammation, or strains or tears in ligaments or tendons  DISCHARGE INSTRUCTIONS:   Follow up with your healthcare provider within 24 hours or as directed: You may need follow-up treatments, such as steroid injections to decrease pain  Write down your questions so you remember to ask them during your visits  Self-care:   · Rest  your knee so it can heal  Limit activities that increase your pain  · Ice  can help reduce swelling  Wrap ice in a towel and put it on your knee for as long and as often as directed  · Compression  with a brace or bandage can help reduce swelling  Use a brace or bandage only as directed  · Elevation  helps decrease pain and swelling  Elevate your knee while you are sitting or lying down  Prop your leg on pillows to keep your knee above the level of your heart  Medicines:   · NSAIDs  help decrease swelling and pain or fever  This medicine is available with or without a doctor's order  NSAIDs can cause stomach bleeding or kidney problems in certain people  If you take blood thinner medicine, always ask your healthcare provider if NSAIDs are safe for you  Always read the medicine label and follow directions  · Acetaminophen  decreases pain and fever  It is available without a doctor's order  Ask how much to take and when to take it  Follow directions  Acetaminophen can cause liver damage if not taken correctly  · Take your medicine as directed  Contact your healthcare provider if you think your medicine is not helping or if you have side effects  Tell him or her if you are allergic to any medicine  Keep a list of the medicines, vitamins, and herbs you take  Include the amounts, and when and why you take them  Bring the list or the pill bottles to follow-up visits  Carry your medicine list with you in case of an emergency  Exercise as directed: You may need to see a physical therapist or do recommended exercises to improve movement and decrease your pain  You may be directed to walk, swim, or ride a bike  Follow your exercise plan exactly as directed to avoid further injury  Contact your healthcare provider if:   · You have questions or concerns about your condition or care  Return to the emergency department if:   · Your pain is worse, even after treatment  · You cannot bend or straighten your leg completely  · The swelling around your knee does not go down even with treatment  · Your knee is painful and hot to the touch  © 2017 [address] Information is for End User's use only and may not be sold, redistributed or otherwise used for commercial purposes  All illustrations and images included in CareNotes® are the copyrighted property of A "[employer] A M , Inc  or Israel Burton  The above information is an  only  It is not intended as medical advice for individual conditions or treatments  Talk to your doctor, nurse or pharmacist before following any medical regimen to see if it is safe and effective for you

## 2018-11-06 DIAGNOSIS — R73.03 PREDIABETES: ICD-10-CM

## 2018-12-28 DIAGNOSIS — R73.03 PREDIABETES: ICD-10-CM

## 2019-01-17 DIAGNOSIS — J45.20 MILD INTERMITTENT ASTHMA WITHOUT COMPLICATION: Chronic | ICD-10-CM

## 2019-01-17 NOTE — TELEPHONE ENCOUNTER
Refilled  Please ask the patient to make an appointment for March 2019  She has not been seen in almost a year

## 2019-01-17 NOTE — TELEPHONE ENCOUNTER
Pt is almost out  Please send refills asap   Pt is scheduled to see you on 3/12/19 (soonest available)

## 2019-05-15 NOTE — PROGRESS NOTES
INTERNAL MEDICINE FOLLOW-UP OFFICE VISIT  [facility]  [address] [place] [address] [place] [address]    NAME: Divya Barros  AGE: 28 y o  SEX: female    DATE OF ENCOUNTER: 3/27/2018    Assessment and Plan   Prediabetes   -Start Metformin     Asthma   -Continue Breo and Ventolin as needed     Obesity   -Advise about calorie counting, discussed options between medical vs  Surgical weight loss   -Pt started a new job and can not take the time at this time, she will let me know when she will have time to make an appointment with medical and surgical weight loss consultants  She would also like to give some further thought to this  No orders of the defined types were placed in this encounter     - Medication Side Effects: Adverse side effects of medications were reviewed with the patient/guardian today  Chief Complaint     Chief Complaint   Patient presents with    Follow-up     Medications review       History of Present Illness     This is a 28year old female who initially was seen in October for weight loss  At that time some labs were sent and it was discovered that the patient is prediabetic  Today patient returns to discuss options for Metformin  She reports that she is doing well, she had her wedding  She has gained 5 lbs since last seen  She reports that she is working out and she is watching what she eats  She denied any complaints  Reports complaince to Breo with Ventolin uses infrequently  She denied any other complaints today  The following portions of the patient's history were reviewed and updated as appropriate: allergies, current medications, past family history, past medical history, past social history, past surgical history and problem list     Review of Systems     Review of Systems   Constitutional: Negative for activity change, appetite change and fever  HENT: Negative for sneezing and sore throat      Eyes: Negative for photophobia and visual disturbance  Respiratory: Negative for cough, chest tightness, shortness of breath and wheezing  Cardiovascular: Negative for chest pain, palpitations and leg swelling  Gastrointestinal: Negative for abdominal pain, blood in stool, constipation, diarrhea, nausea and vomiting  Endocrine: Negative for polydipsia, polyphagia and polyuria  Genitourinary: Negative for difficulty urinating and dysuria  Neurological: Negative for dizziness, light-headedness and numbness  Active Problem List     Patient Active Problem List   Diagnosis    SOB (shortness of breath)    Acute asthma exacerbation    Morbid obesity due to excess calories (HCC)    Chest tightness    Prediabetes    Mild intermittent asthma without complication       Objective     /98 (BP Location: Right arm, Patient Position: Sitting, Cuff Size: Large)   Pulse 76   Temp 98 8 °F (37 1 °C) (Oral)   Ht 5' 4" (1 626 m)   Wt 135 kg (296 lb 11 8 oz)   BMI 50 94 kg/m²     Physical Exam   Constitutional: She is oriented to person, place, and time  She appears well-developed and well-nourished  No distress  HENT:   Head: Normocephalic and atraumatic  Eyes: Conjunctivae are normal  Right eye exhibits no discharge  Left eye exhibits no discharge  Neck: Neck supple  No JVD present  Cardiovascular: Normal rate and regular rhythm  No murmur heard  Pulmonary/Chest: Breath sounds normal  No respiratory distress  She has no wheezes  Abdominal: Soft  She exhibits no distension  There is no tenderness  There is no rebound and no guarding  Musculoskeletal: Normal range of motion  She exhibits no edema  Neurological: She is alert and oriented to person, place, and time  No cranial nerve deficit  Skin: Skin is warm and dry  She is not diaphoretic  No erythema  Nursing note and vitals reviewed        Current Medications     Current Outpatient Prescriptions:     BREO ELLIPTA 200-25 MCG/INH inhaler, Inhale 1 puff daily, Disp: 1 each, Rfl: 6    VENTOLIN  (90 Base) MCG/ACT inhaler, Inhale 2 puffs every 4 (four) hours as needed for wheezing, Disp: 1 Inhaler, Rfl: 6    metFORMIN (GLUCOPHAGE) 500 mg tablet, Take 1 tablet (500 mg total) by mouth 2 (two) times a day with meals, Disp: 60 tablet, Rfl: 3    Health Maintenance   Pap smear- Pt has an appointment scheduled with GYN   Pt refused tdap and influenza today    Dar PAIZ    Internal Medicine PGY-2  3/27/2018 2:04 PM If you are a smoker, it is important for your health to stop smoking. Please be aware that second hand smoke is also harmful.

## 2020-02-06 ENCOUNTER — APPOINTMENT (EMERGENCY)
Dept: RADIOLOGY | Facility: HOSPITAL | Age: 35
End: 2020-02-06

## 2020-02-06 ENCOUNTER — HOSPITAL ENCOUNTER (EMERGENCY)
Facility: HOSPITAL | Age: 35
Discharge: HOME | End: 2020-02-07
Attending: EMERGENCY MEDICINE | Admitting: EMERGENCY MEDICINE

## 2020-02-06 VITALS
TEMPERATURE: 98.8 F | OXYGEN SATURATION: 98 % | BODY MASS INDEX: 42.52 KG/M2 | WEIGHT: 240 LBS | HEIGHT: 63 IN | DIASTOLIC BLOOD PRESSURE: 86 MMHG | HEART RATE: 97 BPM | SYSTOLIC BLOOD PRESSURE: 125 MMHG | RESPIRATION RATE: 20 BRPM

## 2020-02-06 DIAGNOSIS — J18.9 PNEUMONIA: Primary | ICD-10-CM

## 2020-02-06 DIAGNOSIS — R05.9 COUGH: ICD-10-CM

## 2020-02-06 DIAGNOSIS — J45.909 ASTHMA: ICD-10-CM

## 2020-02-06 DIAGNOSIS — R09.81 NASAL CONGESTION: ICD-10-CM

## 2020-02-06 PROCEDURE — 94640 AIRWAY INHALATION TREATMENT: CPT

## 2020-02-06 PROCEDURE — 99285 EMERGENCY DEPT VISIT HI MDM: CPT | Performed by: EMERGENCY MEDICINE

## 2020-02-06 PROCEDURE — 99283 EMERGENCY DEPT VISIT LOW MDM: CPT

## 2020-02-06 PROCEDURE — 71046 X-RAY EXAM CHEST 2 VIEWS: CPT

## 2020-02-06 RX ORDER — ALBUTEROL SULFATE 90 UG/1
2 AEROSOL, METERED RESPIRATORY (INHALATION) ONCE
Status: COMPLETED | OUTPATIENT
Start: 2020-02-06 | End: 2020-02-06

## 2020-02-06 RX ORDER — PREDNISONE 20 MG/1
40 TABLET ORAL ONCE
Status: COMPLETED | OUTPATIENT
Start: 2020-02-06 | End: 2020-02-06

## 2020-02-06 RX ORDER — FLUTICASONE PROPIONATE 50 MCG
2 SPRAY, SUSPENSION (ML) NASAL ONCE
Status: COMPLETED | OUTPATIENT
Start: 2020-02-06 | End: 2020-02-06

## 2020-02-06 RX ORDER — ALBUTEROL SULFATE 2.5 MG/3ML
5 SOLUTION RESPIRATORY (INHALATION) ONCE
Status: COMPLETED | OUTPATIENT
Start: 2020-02-06 | End: 2020-02-06

## 2020-02-06 RX ORDER — ACETAMINOPHEN 325 MG/1
975 TABLET ORAL ONCE
Status: COMPLETED | OUTPATIENT
Start: 2020-02-06 | End: 2020-02-06

## 2020-02-06 RX ADMIN — IPRATROPIUM BROMIDE 0.5 MG: 0.5 SOLUTION RESPIRATORY (INHALATION) at 22:51

## 2020-02-06 RX ADMIN — ACETAMINOPHEN 975 MG: 325 TABLET ORAL at 22:51

## 2020-02-06 RX ADMIN — FLUTICASONE PROPIONATE 2 SPRAY: 50 SPRAY, METERED NASAL at 23:23

## 2020-02-06 RX ADMIN — ALBUTEROL SULFATE 5 MG: 2.5 SOLUTION RESPIRATORY (INHALATION) at 22:51

## 2020-02-06 RX ADMIN — PREDNISONE 40 MG: 20 TABLET ORAL at 22:51

## 2020-02-06 RX ADMIN — ALBUTEROL SULFATE 2 PUFF: 90 AEROSOL, METERED RESPIRATORY (INHALATION) at 22:51

## 2020-02-07 RX ORDER — DOXYCYCLINE HYCLATE 100 MG/1
100 CAPSULE ORAL 2 TIMES DAILY
Qty: 14 CAPSULE | Refills: 0 | Status: SHIPPED | OUTPATIENT
Start: 2020-02-07 | End: 2020-02-14

## 2020-02-07 RX ORDER — PREDNISONE 20 MG/1
40 TABLET ORAL DAILY
Qty: 8 TABLET | Refills: 0 | Status: SHIPPED | OUTPATIENT
Start: 2020-02-07 | End: 2020-02-11

## 2020-02-07 NOTE — ED ATTENDING ATTESTATION
2/6/2020  INegra MD, saw and evaluated the patient  I have discussed the patient with the resident/non-physician practitioner and agree with the resident's/non-physician practitioner's findings, Plan of Care, and MDM as documented in the resident's/non-physician practitioner's note, except where noted  All available labs and Radiology studies were reviewed  I was present for key portions of any procedure(s) performed by the resident/non-physician practitioner and I was immediately available to provide assistance  At this point I agree with the current assessment done in the Emergency Department  I have conducted an independent evaluation of this patient a history and physical is as follows:    ED Course         Critical Care Time  Procedures     28 yo female c/o two day of wheezing, cough, nasal congestion, out of inhaler  Pt with  Nausea  Pt with no cp, has sick contacts, daughters are sick at home  No fever  pmh dm, ashtma  Vss, afebrile, lungs wheezing bilaterally, rrr, abodmen soft nontender   Flonase, udn, cxr, steroids

## 2020-02-07 NOTE — ED PROVIDER NOTES
History  Chief Complaint   Patient presents with    Flu Symptoms     Pt reports fever, cough and chills for 3 days  Pt states "I've been really wheezing a lot and coughing up green stuff " Denies flu shot  HPI    28-year-old female pmhx asthma, DM2, presents for evaluation of cough and nasal congestion  Patient notes she began having symptoms yesterday that have been getting progressively worse  She notes cough is productive and she has mild chest discomfort when coughing  She also notes chest tightness and wheezing but ran out of her albuterol a long time ago and does not have insurance  Patient notes subjective fevers and chills  She denies headache, neck pain, sore throat, nausea, vomiting, diarrhea, or rash  Patient notes everybody at home is sick with "flu-like" symptoms, her 2 daughters are currently being seen in the ED for similar symptoms  Prior to Admission Medications   Prescriptions Last Dose Informant Patient Reported? Taking?    BREO ELLIPTA 200-25 MCG/INH inhaler   No No   Sig: Inhale 1 puff daily   VENTOLIN  (90 Base) MCG/ACT inhaler   No No   Sig: Inhale 2 puffs every 4 (four) hours as needed for wheezing   metFORMIN (GLUCOPHAGE) 500 mg tablet   No No   Sig: Take 1 tablet (500 mg total) by mouth 2 (two) times a day with meals   metFORMIN (GLUCOPHAGE) 500 mg tablet   No No   Sig: TAKE 1 TABLET (500 MG TOTAL) BY MOUTH 2 (TWO) TIMES A DAY WITH MEALS   naproxen (NAPROSYN) 500 mg tablet   No No   Sig: Take 1 tablet (500 mg total) by mouth 2 (two) times a day with meals      Facility-Administered Medications: None       Past Medical History:   Diagnosis Date    Asthma        Past Surgical History:   Procedure Laterality Date     SECTION         Family History   Problem Relation Age of Onset    No Known Problems Other     Arthritis Mother     Fibromyalgia Mother     Diabetes Family     Cancer Family     Heart disease Family      I have reviewed and agree with the history as documented  Social History     Tobacco Use    Smoking status: Never Smoker    Smokeless tobacco: Never Used   Substance Use Topics    Alcohol use: No    Drug use: No        Review of Systems   Constitutional: Positive for chills and fever  Negative for diaphoresis and fatigue  Subjective   HENT: Positive for congestion and rhinorrhea  Negative for sore throat and trouble swallowing  Eyes: Negative for photophobia and visual disturbance  Respiratory: Positive for cough, chest tightness, shortness of breath and wheezing  Cardiovascular: Negative for chest pain and palpitations  Gastrointestinal: Negative for abdominal pain, blood in stool, constipation, diarrhea, nausea and vomiting  Genitourinary: Negative for decreased urine volume, dysuria, frequency and hematuria  Musculoskeletal: Positive for myalgias  Negative for back pain, gait problem, neck pain and neck stiffness  Skin: Negative for pallor and rash  Neurological: Negative for weakness, light-headedness, numbness and headaches  Hematological: Negative for adenopathy  Does not bruise/bleed easily  All other systems reviewed and are negative  Physical Exam  ED Triage Vitals [02/06/20 2156]   Temperature Pulse Respirations Blood Pressure SpO2   98 8 °F (37 1 °C) 97 20 125/86 98 %      Temp Source Heart Rate Source Patient Position - Orthostatic VS BP Location FiO2 (%)   Oral Monitor Sitting Left arm --      Pain Score       No Pain             Orthostatic Vital Signs  Vitals:    02/06/20 2156   BP: 125/86   Pulse: 97   Patient Position - Orthostatic VS: Sitting       Physical Exam   Constitutional: She is oriented to person, place, and time  She appears well-developed and well-nourished  No distress  Patient is alert and oriented, appears well and nontoxic, in no acute distress   HENT:   Head: Atraumatic  Nose: Mucosal edema and rhinorrhea present     Mouth/Throat: Uvula is midline and mucous membranes are normal  Posterior oropharyngeal edema and posterior oropharyngeal erythema present  No oropharyngeal exudate  Eyes: Pupils are equal, round, and reactive to light  Conjunctivae and EOM are normal    Neck: Normal range of motion  Neck supple  Cardiovascular: Normal rate, regular rhythm, normal heart sounds and intact distal pulses  Pulmonary/Chest: Effort normal  No stridor  No respiratory distress  She has wheezes  She has no rales  Diffuse coarse breath sounds   Abdominal: Soft  Bowel sounds are normal  She exhibits no distension  There is no tenderness  Musculoskeletal: Normal range of motion  She exhibits no edema  Lymphadenopathy:     She has no cervical adenopathy  Neurological: She is alert and oriented to person, place, and time  No cranial nerve deficit  She exhibits normal muscle tone  Skin: Skin is warm and dry  Capillary refill takes less than 2 seconds  No rash noted  She is not diaphoretic  No erythema  No pallor  Psychiatric: She has a normal mood and affect  Her behavior is normal  Judgment and thought content normal    Nursing note and vitals reviewed        ED Medications  Medications   acetaminophen (TYLENOL) tablet 975 mg (975 mg Oral Given 2/6/20 2251)   albuterol inhalation solution 5 mg (5 mg Nebulization Given 2/6/20 2251)   ipratropium (ATROVENT) 0 02 % inhalation solution 0 5 mg (0 5 mg Nebulization Given 2/6/20 2251)   predniSONE tablet 40 mg (40 mg Oral Given 2/6/20 2251)   albuterol (PROVENTIL HFA,VENTOLIN HFA) inhaler 2 puff (2 puffs Inhalation Given 2/6/20 2251)   fluticasone (FLONASE) 50 mcg/act nasal spray 2 spray (2 sprays Each Nare Given 2/6/20 2323)       Diagnostic Studies  Results Reviewed     None                 XR chest 2 views    (Results Pending)         Procedures  Procedures      ED Course                               MDM  Number of Diagnoses or Management Options  Asthma:   Cough:   Nasal congestion:   Pneumonia:   Diagnosis management comments: 60-year-old female presents for evaluation of cough, wheezing, and nasal congestion  Patient appears clinically well and is in no acute distress  Patient is hemodynamically stable  Will provide a DuoNeb, Tylenol, prednisone, and Flonase  Will obtain chest x-ray  Will discharge with doxycycline and 4 days of prednisone  Provided information to the clinic and thoroughly discussed return precautions  Disposition  Final diagnoses:   Pneumonia   Asthma   Nasal congestion   Cough     Time reflects when diagnosis was documented in both MDM as applicable and the Disposition within this note     Time User Action Codes Description Comment    2/7/2020 12:00 AM Perry Amsler Add [J18 9] Pneumonia     2/7/2020 12:00 AM Baldo Shade [X86 923] Asthma     2/7/2020 12:00 AM Perry Amsler Add [R09 81] Nasal congestion     2/7/2020 12:00 AM Perry Amsler Add [R05] Cough       ED Disposition     ED Disposition Condition Date/Time Comment    Discharge Stable Thu Feb 6, 2020 11:54 PM [address] discharge to home/self care              Follow-up Information     Follow up With Specialties Details Why Contact Info Additional [address] Internal Medicine Schedule an appointment as soon as possible for a visit  As needed [facility] [id number]  [address] [place] [address], [id number]   [facility] Emergency Department Emergency Medicine Go to  As needed, If symptoms worsen [address] [place] [address]  [phone]  ED, [address], [address] [phone]          Discharge Medication List as of 2/7/2020 12:02 AM      START taking these medications    Details   doxycycline hyclate (VIBRAMYCIN) 100 mg capsule Take 1 capsule (100 mg total) by mouth 2 (two) times a day for 7 days, Starting Fri 2/7/2020, Until Fri 2/14/2020, Normal      predniSONE 20 mg tablet Take 2 tablets (40 mg total) by mouth daily for 4 days, Starting Fri 2/7/2020, Until Tue 2/11/2020, Normal         CONTINUE these medications which have NOT CHANGED    Details   BREO ELLIPTA 200-25 MCG/INH inhaler Inhale 1 puff daily, Starting Thu 1/17/2019, Normal      !! metFORMIN (GLUCOPHAGE) 500 mg tablet Take 1 tablet (500 mg total) by mouth 2 (two) times a day with meals, Starting Wed 11/7/2018, Normal      !! metFORMIN (GLUCOPHAGE) 500 mg tablet TAKE 1 TABLET (500 MG TOTAL) BY MOUTH 2 (TWO) TIMES A DAY WITH MEALS, Starting Sat 12/29/2018, Normal      naproxen (NAPROSYN) 500 mg tablet Take 1 tablet (500 mg total) by mouth 2 (two) times a day with meals, Starting Mon 7/30/2018, Print      VENTOLIN  (90 Base) MCG/ACT inhaler Inhale 2 puffs every 4 (four) hours as needed for wheezing, Starting Thu 1/17/2019, Normal       !! - Potential duplicate medications found  Please discuss with provider  No discharge procedures on file  ED Provider  Attending physically available and evaluated Andreia See  I managed the patient along with the ED Attending      Electronically Signed by         Mathieu Gross DO  02/07/20 3773

## 2020-05-14 ENCOUNTER — TELEMEDICINE (OUTPATIENT)
Dept: INTERNAL MEDICINE CLINIC | Facility: CLINIC | Age: 35
End: 2020-05-14

## 2020-05-14 DIAGNOSIS — J30.2 SEASONAL ALLERGIES: ICD-10-CM

## 2020-05-14 DIAGNOSIS — J45.20 MILD INTERMITTENT ASTHMA WITHOUT COMPLICATION: Primary | Chronic | ICD-10-CM

## 2020-05-14 PROCEDURE — T1015 CLINIC SERVICE: HCPCS | Performed by: INTERNAL MEDICINE

## 2020-05-14 PROCEDURE — 99213 OFFICE O/P EST LOW 20 MIN: CPT | Performed by: INTERNAL MEDICINE

## 2020-05-14 RX ORDER — ALBUTEROL SULFATE 2.5 MG/3ML
2.5 SOLUTION RESPIRATORY (INHALATION) EVERY 6 HOURS PRN
Qty: 1 VIAL | Refills: 3 | Status: SHIPPED | OUTPATIENT
Start: 2020-05-14 | End: 2020-06-13

## 2020-05-14 RX ORDER — LORATADINE 10 MG/1
10 TABLET ORAL DAILY
Qty: 30 TABLET | Refills: 2 | Status: SHIPPED | OUTPATIENT
Start: 2020-05-14 | End: 2020-12-04

## 2020-06-26 ENCOUNTER — HOSPITAL ENCOUNTER (EMERGENCY)
Facility: HOSPITAL | Age: 35
Discharge: HOME | End: 2020-06-26
Attending: EMERGENCY MEDICINE | Admitting: EMERGENCY MEDICINE
Payer: MEDICARE

## 2020-06-26 ENCOUNTER — APPOINTMENT (EMERGENCY)
Dept: RADIOLOGY | Facility: HOSPITAL | Age: 35
End: 2020-06-26
Payer: MEDICARE

## 2020-06-26 VITALS
OXYGEN SATURATION: 98 % | DIASTOLIC BLOOD PRESSURE: 69 MMHG | TEMPERATURE: 97.3 F | HEART RATE: 70 BPM | BODY MASS INDEX: 49.6 KG/M2 | SYSTOLIC BLOOD PRESSURE: 144 MMHG | WEIGHT: 280 LBS | RESPIRATION RATE: 23 BRPM

## 2020-06-26 DIAGNOSIS — R07.89 NON-CARDIAC CHEST PAIN: Primary | ICD-10-CM

## 2020-06-26 DIAGNOSIS — M79.661 RIGHT CALF PAIN: ICD-10-CM

## 2020-06-26 DIAGNOSIS — M79.89 LEG SWELLING: ICD-10-CM

## 2020-06-26 LAB
ALBUMIN SERPL BCP-MCNC: 3.5 G/DL (ref 3.5–5)
ALP SERPL-CCNC: 114 U/L (ref 46–116)
ALT SERPL W P-5'-P-CCNC: 23 U/L (ref 12–78)
ANION GAP SERPL CALCULATED.3IONS-SCNC: 6 MMOL/L (ref 4–13)
AST SERPL W P-5'-P-CCNC: 12 U/L (ref 5–45)
BASOPHILS # BLD AUTO: 0.05 THOUSANDS/ΜL (ref 0–0.1)
BASOPHILS NFR BLD AUTO: 0 % (ref 0–1)
BILIRUB SERPL-MCNC: <0.1 MG/DL (ref 0.2–1)
BUN SERPL-MCNC: 8 MG/DL (ref 5–25)
CALCIUM SERPL-MCNC: 9.4 MG/DL (ref 8.3–10.1)
CHLORIDE SERPL-SCNC: 105 MMOL/L (ref 100–108)
CO2 SERPL-SCNC: 27 MMOL/L (ref 21–32)
CREAT SERPL-MCNC: 0.73 MG/DL (ref 0.6–1.3)
D DIMER PPP FEU-MCNC: 0.56 UG/ML FEU
EOSINOPHIL # BLD AUTO: 0.68 THOUSAND/ΜL (ref 0–0.61)
EOSINOPHIL NFR BLD AUTO: 6 % (ref 0–6)
ERYTHROCYTE [DISTWIDTH] IN BLOOD BY AUTOMATED COUNT: 17.2 % (ref 11.6–15.1)
EXT PREG TEST URINE: NEGATIVE
EXT. CONTROL ED NAV: NORMAL
GFR SERPL CREATININE-BSD FRML MDRD: 108 ML/MIN/1.73SQ M
GLUCOSE SERPL-MCNC: 86 MG/DL (ref 65–140)
HCT VFR BLD AUTO: 40 % (ref 34.8–46.1)
HGB BLD-MCNC: 12.1 G/DL (ref 11.5–15.4)
IMM GRANULOCYTES # BLD AUTO: 0.04 THOUSAND/UL (ref 0–0.2)
IMM GRANULOCYTES NFR BLD AUTO: 0 % (ref 0–2)
LYMPHOCYTES # BLD AUTO: 3.36 THOUSANDS/ΜL (ref 0.6–4.47)
LYMPHOCYTES NFR BLD AUTO: 30 % (ref 14–44)
MCH RBC QN AUTO: 23.8 PG (ref 26.8–34.3)
MCHC RBC AUTO-ENTMCNC: 30.3 G/DL (ref 31.4–37.4)
MCV RBC AUTO: 79 FL (ref 82–98)
MONOCYTES # BLD AUTO: 0.63 THOUSAND/ΜL (ref 0.17–1.22)
MONOCYTES NFR BLD AUTO: 6 % (ref 4–12)
NEUTROPHILS # BLD AUTO: 6.63 THOUSANDS/ΜL (ref 1.85–7.62)
NEUTS SEG NFR BLD AUTO: 58 % (ref 43–75)
NRBC BLD AUTO-RTO: 0 /100 WBCS
PLATELET # BLD AUTO: 404 THOUSANDS/UL (ref 149–390)
PMV BLD AUTO: 9.9 FL (ref 8.9–12.7)
POTASSIUM SERPL-SCNC: 3.7 MMOL/L (ref 3.5–5.3)
PROT SERPL-MCNC: 8.7 G/DL (ref 6.4–8.2)
RBC # BLD AUTO: 5.08 MILLION/UL (ref 3.81–5.12)
SODIUM SERPL-SCNC: 138 MMOL/L (ref 136–145)
TROPONIN I SERPL-MCNC: <0.02 NG/ML
WBC # BLD AUTO: 11.39 THOUSAND/UL (ref 4.31–10.16)

## 2020-06-26 PROCEDURE — 81025 URINE PREGNANCY TEST: CPT | Performed by: EMERGENCY MEDICINE

## 2020-06-26 PROCEDURE — 80053 COMPREHEN METABOLIC PANEL: CPT | Performed by: EMERGENCY MEDICINE

## 2020-06-26 PROCEDURE — 96374 THER/PROPH/DIAG INJ IV PUSH: CPT

## 2020-06-26 PROCEDURE — 71045 X-RAY EXAM CHEST 1 VIEW: CPT

## 2020-06-26 PROCEDURE — 85379 FIBRIN DEGRADATION QUANT: CPT | Performed by: EMERGENCY MEDICINE

## 2020-06-26 PROCEDURE — 93005 ELECTROCARDIOGRAM TRACING: CPT

## 2020-06-26 PROCEDURE — 71275 CT ANGIOGRAPHY CHEST: CPT

## 2020-06-26 PROCEDURE — 99284 EMERGENCY DEPT VISIT MOD MDM: CPT | Performed by: EMERGENCY MEDICINE

## 2020-06-26 PROCEDURE — 99285 EMERGENCY DEPT VISIT HI MDM: CPT

## 2020-06-26 PROCEDURE — 84484 ASSAY OF TROPONIN QUANT: CPT | Performed by: EMERGENCY MEDICINE

## 2020-06-26 PROCEDURE — 85025 COMPLETE CBC W/AUTO DIFF WBC: CPT | Performed by: EMERGENCY MEDICINE

## 2020-06-26 PROCEDURE — 36415 COLL VENOUS BLD VENIPUNCTURE: CPT | Performed by: EMERGENCY MEDICINE

## 2020-06-26 RX ORDER — KETOROLAC TROMETHAMINE 30 MG/ML
15 INJECTION, SOLUTION INTRAMUSCULAR; INTRAVENOUS ONCE
Status: COMPLETED | OUTPATIENT
Start: 2020-06-26 | End: 2020-06-26

## 2020-06-26 RX ORDER — LIDOCAINE 50 MG/G
1 PATCH TOPICAL ONCE
Status: DISCONTINUED | OUTPATIENT
Start: 2020-06-26 | End: 2020-06-26 | Stop reason: HOSPADM

## 2020-06-26 RX ADMIN — LIDOCAINE 1 PATCH: 50 PATCH TOPICAL at 20:27

## 2020-06-26 RX ADMIN — KETOROLAC TROMETHAMINE 15 MG: 30 INJECTION, SOLUTION INTRAMUSCULAR at 18:22

## 2020-06-26 RX ADMIN — IOHEXOL 85 ML: 350 INJECTION, SOLUTION INTRAVENOUS at 19:36

## 2020-06-27 LAB
ATRIAL RATE: 81 BPM
P AXIS: 55 DEGREES
PR INTERVAL: 164 MS
QRS AXIS: 35 DEGREES
QRSD INTERVAL: 90 MS
QT INTERVAL: 382 MS
QTC INTERVAL: 443 MS
T WAVE AXIS: 52 DEGREES
VENTRICULAR RATE: 81 BPM

## 2020-06-27 PROCEDURE — 93010 ELECTROCARDIOGRAM REPORT: CPT | Performed by: INTERNAL MEDICINE

## 2020-10-09 ENCOUNTER — TELEPHONE (OUTPATIENT)
Dept: INTERNAL MEDICINE CLINIC | Facility: CLINIC | Age: 35
End: 2020-10-09

## 2020-10-12 ENCOUNTER — APPOINTMENT (OUTPATIENT)
Dept: LAB | Facility: HOSPITAL | Age: 35
End: 2020-10-12
Payer: COMMERCIAL

## 2020-10-12 ENCOUNTER — OFFICE VISIT (OUTPATIENT)
Dept: INTERNAL MEDICINE CLINIC | Facility: CLINIC | Age: 35
End: 2020-10-12

## 2020-10-12 VITALS
TEMPERATURE: 97.3 F | DIASTOLIC BLOOD PRESSURE: 80 MMHG | WEIGHT: 293 LBS | BODY MASS INDEX: 56.37 KG/M2 | SYSTOLIC BLOOD PRESSURE: 132 MMHG

## 2020-10-12 DIAGNOSIS — Z11.59 NEED FOR HEPATITIS C SCREENING TEST: ICD-10-CM

## 2020-10-12 DIAGNOSIS — R63.5 WEIGHT GAIN: ICD-10-CM

## 2020-10-12 DIAGNOSIS — J45.20 MILD INTERMITTENT ASTHMA WITHOUT COMPLICATION: Primary | ICD-10-CM

## 2020-10-12 DIAGNOSIS — R51.9 CHRONIC INTRACTABLE HEADACHE, UNSPECIFIED HEADACHE TYPE: ICD-10-CM

## 2020-10-12 DIAGNOSIS — G89.29 CHRONIC INTRACTABLE HEADACHE, UNSPECIFIED HEADACHE TYPE: ICD-10-CM

## 2020-10-12 DIAGNOSIS — Z11.4 ENCOUNTER FOR SCREENING FOR HIV: ICD-10-CM

## 2020-10-12 DIAGNOSIS — R73.03 PREDIABETES: ICD-10-CM

## 2020-10-12 DIAGNOSIS — N91.2 AMENORRHEA: ICD-10-CM

## 2020-10-12 DIAGNOSIS — Z28.21 INFLUENZA VACCINATION DECLINED BY PATIENT: ICD-10-CM

## 2020-10-12 LAB
HCG SERPL QL: NEGATIVE
HCV AB SER QL: NORMAL
PROLACTIN SERPL-MCNC: 7.5 NG/ML
TSH SERPL DL<=0.05 MIU/L-ACNC: 2.15 UIU/ML (ref 0.36–3.74)

## 2020-10-12 PROCEDURE — 84443 ASSAY THYROID STIM HORMONE: CPT

## 2020-10-12 PROCEDURE — 87389 HIV-1 AG W/HIV-1&-2 AB AG IA: CPT

## 2020-10-12 PROCEDURE — 84703 CHORIONIC GONADOTROPIN ASSAY: CPT

## 2020-10-12 PROCEDURE — 36415 COLL VENOUS BLD VENIPUNCTURE: CPT

## 2020-10-12 PROCEDURE — 86803 HEPATITIS C AB TEST: CPT

## 2020-10-12 PROCEDURE — 84146 ASSAY OF PROLACTIN: CPT

## 2020-10-12 PROCEDURE — 99214 OFFICE O/P EST MOD 30 MIN: CPT | Performed by: INTERNAL MEDICINE

## 2020-10-12 PROCEDURE — 1036F TOBACCO NON-USER: CPT | Performed by: INTERNAL MEDICINE

## 2020-10-12 RX ORDER — METFORMIN HYDROCHLORIDE 500 MG/1
500 TABLET, EXTENDED RELEASE ORAL
Qty: 60 TABLET | Refills: 0 | Status: SHIPPED | OUTPATIENT
Start: 2020-10-12 | End: 2021-02-09

## 2020-10-12 RX ORDER — FLUTICASONE PROPIONATE AND SALMETEROL XINAFOATE 45; 21 UG/1; UG/1
2 AEROSOL, METERED RESPIRATORY (INHALATION) 2 TIMES DAILY
Qty: 1 INHALER | Refills: 0 | Status: SHIPPED | OUTPATIENT
Start: 2020-10-12 | End: 2020-11-03

## 2020-10-12 RX ORDER — ALBUTEROL SULFATE 90 UG/1
2 AEROSOL, METERED RESPIRATORY (INHALATION) EVERY 6 HOURS PRN
Qty: 6.7 G | Refills: 0 | Status: SHIPPED | OUTPATIENT
Start: 2020-10-12 | End: 2020-11-03

## 2020-10-12 RX ORDER — CYCLOBENZAPRINE HCL 5 MG
10 TABLET ORAL
Qty: 30 TABLET | Refills: 0 | Status: SHIPPED | OUTPATIENT
Start: 2020-10-12 | End: 2021-04-06 | Stop reason: SDUPTHER

## 2020-10-12 RX ORDER — RIBOFLAVIN (VITAMIN B2) 400 MG
400 TABLET ORAL DAILY
Qty: 30 TABLET | Refills: 0 | Status: SHIPPED | OUTPATIENT
Start: 2020-10-12 | End: 2021-02-09

## 2020-10-13 ENCOUNTER — TELEPHONE (OUTPATIENT)
Dept: OTHER | Facility: HOSPITAL | Age: 35
End: 2020-10-13

## 2020-10-13 LAB — HIV 1+2 AB+HIV1 P24 AG SERPL QL IA: NORMAL

## 2020-11-03 DIAGNOSIS — J45.20 MILD INTERMITTENT ASTHMA WITHOUT COMPLICATION: ICD-10-CM

## 2020-11-03 RX ORDER — ALBUTEROL SULFATE 90 UG/1
AEROSOL, METERED RESPIRATORY (INHALATION)
Qty: 3 INHALER | Refills: 3 | Status: SHIPPED | OUTPATIENT
Start: 2020-11-03 | End: 2020-11-19 | Stop reason: ALTCHOICE

## 2020-11-03 RX ORDER — FLUTICASONE PROPIONATE AND SALMETEROL XINAFOATE 45; 21 UG/1; UG/1
2 AEROSOL, METERED RESPIRATORY (INHALATION) 2 TIMES DAILY
Qty: 3 INHALER | Refills: 3 | Status: SHIPPED | OUTPATIENT
Start: 2020-11-03 | End: 2021-03-02 | Stop reason: SDUPTHER

## 2020-11-19 ENCOUNTER — OFFICE VISIT (OUTPATIENT)
Dept: INTERNAL MEDICINE CLINIC | Facility: CLINIC | Age: 35
End: 2020-11-19

## 2020-11-19 VITALS
BODY MASS INDEX: 56.83 KG/M2 | DIASTOLIC BLOOD PRESSURE: 80 MMHG | TEMPERATURE: 97.5 F | WEIGHT: 293 LBS | SYSTOLIC BLOOD PRESSURE: 128 MMHG

## 2020-11-19 DIAGNOSIS — R63.5 WEIGHT GAIN: ICD-10-CM

## 2020-11-19 DIAGNOSIS — G89.29 CHRONIC INTRACTABLE HEADACHE, UNSPECIFIED HEADACHE TYPE: Primary | ICD-10-CM

## 2020-11-19 DIAGNOSIS — R51.9 CHRONIC INTRACTABLE HEADACHE, UNSPECIFIED HEADACHE TYPE: Primary | ICD-10-CM

## 2020-11-19 DIAGNOSIS — J45.20 MILD INTERMITTENT ASTHMA WITHOUT COMPLICATION: ICD-10-CM

## 2020-11-19 DIAGNOSIS — R73.03 PREDIABETES: ICD-10-CM

## 2020-11-19 DIAGNOSIS — Z23 NEED FOR PNEUMOCOCCAL VACCINATION: ICD-10-CM

## 2020-11-19 DIAGNOSIS — Z23 NEED FOR HEPATITIS B VACCINATION: ICD-10-CM

## 2020-11-19 DIAGNOSIS — Z28.21 REFUSED INFLUENZA VACCINE: ICD-10-CM

## 2020-11-19 PROCEDURE — 90670 PCV13 VACCINE IM: CPT

## 2020-11-19 PROCEDURE — 90472 IMMUNIZATION ADMIN EACH ADD: CPT

## 2020-11-19 PROCEDURE — 3725F SCREEN DEPRESSION PERFORMED: CPT | Performed by: INTERNAL MEDICINE

## 2020-11-19 PROCEDURE — 1036F TOBACCO NON-USER: CPT | Performed by: INTERNAL MEDICINE

## 2020-11-19 PROCEDURE — 90746 HEPB VACCINE 3 DOSE ADULT IM: CPT

## 2020-11-19 PROCEDURE — 99215 OFFICE O/P EST HI 40 MIN: CPT | Performed by: INTERNAL MEDICINE

## 2020-11-19 PROCEDURE — 90471 IMMUNIZATION ADMIN: CPT

## 2020-11-19 RX ORDER — ALBUTEROL SULFATE 90 UG/1
2 AEROSOL, METERED RESPIRATORY (INHALATION) EVERY 6 HOURS PRN
Qty: 6.7 G | Refills: 3 | Status: SHIPPED | OUTPATIENT
Start: 2020-11-19 | End: 2021-03-02 | Stop reason: SDUPTHER

## 2020-11-19 RX ORDER — ALBUTEROL SULFATE 90 UG/1
AEROSOL, METERED RESPIRATORY (INHALATION)
Qty: 3 INHALER | Refills: 3 | Status: CANCELLED | OUTPATIENT
Start: 2020-11-19

## 2020-11-30 ENCOUNTER — TELEPHONE (OUTPATIENT)
Dept: INTERNAL MEDICINE CLINIC | Facility: CLINIC | Age: 35
End: 2020-11-30

## 2020-11-30 ENCOUNTER — TELEMEDICINE (OUTPATIENT)
Dept: INTERNAL MEDICINE CLINIC | Facility: CLINIC | Age: 35
End: 2020-11-30

## 2020-11-30 VITALS — WEIGHT: 293 LBS | HEIGHT: 63 IN | TEMPERATURE: 98.9 F | BODY MASS INDEX: 51.91 KG/M2

## 2020-11-30 DIAGNOSIS — R05.9 COUGH: ICD-10-CM

## 2020-11-30 DIAGNOSIS — R05.8 COUGH WITH EXPOSURE TO COVID-19 VIRUS: ICD-10-CM

## 2020-11-30 DIAGNOSIS — R06.02 SOB (SHORTNESS OF BREATH): Primary | ICD-10-CM

## 2020-11-30 DIAGNOSIS — Z20.822 COUGH WITH EXPOSURE TO COVID-19 VIRUS: ICD-10-CM

## 2020-11-30 DIAGNOSIS — J45.901 ASTHMA WITH ACUTE EXACERBATION, UNSPECIFIED ASTHMA SEVERITY, UNSPECIFIED WHETHER PERSISTENT: ICD-10-CM

## 2020-11-30 PROCEDURE — 3008F BODY MASS INDEX DOCD: CPT | Performed by: INTERNAL MEDICINE

## 2020-11-30 PROCEDURE — G2012 BRIEF CHECK IN BY MD/QHP: HCPCS | Performed by: PHYSICIAN ASSISTANT

## 2020-11-30 RX ORDER — FLUTICASONE PROPIONATE AND SALMETEROL XINAFOATE 230; 21 UG/1; UG/1
2 AEROSOL, METERED RESPIRATORY (INHALATION) 2 TIMES DAILY
Qty: 1 INHALER | Refills: 0 | Status: SHIPPED | OUTPATIENT
Start: 2020-11-30 | End: 2020-12-04

## 2020-11-30 RX ORDER — ALBUTEROL SULFATE 2.5 MG/3ML
2.5 SOLUTION RESPIRATORY (INHALATION) EVERY 6 HOURS PRN
Qty: 30 VIAL | Refills: 1 | Status: SHIPPED | OUTPATIENT
Start: 2020-11-30 | End: 2021-02-09

## 2020-11-30 RX ORDER — BENZONATATE 200 MG/1
200 CAPSULE ORAL 3 TIMES DAILY PRN
Qty: 30 CAPSULE | Refills: 0 | Status: SHIPPED | OUTPATIENT
Start: 2020-11-30 | End: 2021-03-23

## 2020-12-01 ENCOUNTER — APPOINTMENT (EMERGENCY)
Dept: RADIOLOGY | Facility: HOSPITAL | Age: 35
End: 2020-12-01
Payer: COMMERCIAL

## 2020-12-01 ENCOUNTER — HOSPITAL ENCOUNTER (EMERGENCY)
Facility: HOSPITAL | Age: 35
Discharge: HOME | End: 2020-12-01
Attending: EMERGENCY MEDICINE | Admitting: EMERGENCY MEDICINE
Payer: COMMERCIAL

## 2020-12-01 VITALS
SYSTOLIC BLOOD PRESSURE: 111 MMHG | RESPIRATION RATE: 19 BRPM | DIASTOLIC BLOOD PRESSURE: 83 MMHG | HEART RATE: 99 BPM | OXYGEN SATURATION: 95 % | TEMPERATURE: 98.7 F

## 2020-12-01 DIAGNOSIS — Z20.822 SUSPECTED COVID-19 VIRUS INFECTION: Primary | ICD-10-CM

## 2020-12-01 DIAGNOSIS — R05.9 COUGH: ICD-10-CM

## 2020-12-01 DIAGNOSIS — R07.9 CHEST PAIN: ICD-10-CM

## 2020-12-01 DIAGNOSIS — R06.00 DYSPNEA: ICD-10-CM

## 2020-12-01 DIAGNOSIS — J45.901 ASTHMA WITH ACUTE EXACERBATION, UNSPECIFIED ASTHMA SEVERITY, UNSPECIFIED WHETHER PERSISTENT: Primary | ICD-10-CM

## 2020-12-01 DIAGNOSIS — R11.2 NAUSEA & VOMITING: ICD-10-CM

## 2020-12-01 LAB
ALBUMIN SERPL BCP-MCNC: 3.3 G/DL (ref 3.5–5)
ALP SERPL-CCNC: 96 U/L (ref 46–116)
ALT SERPL W P-5'-P-CCNC: 25 U/L (ref 12–78)
ANION GAP SERPL CALCULATED.3IONS-SCNC: 8 MMOL/L (ref 4–13)
AST SERPL W P-5'-P-CCNC: 21 U/L (ref 5–45)
ATRIAL RATE: 83 BPM
B-HCG SERPL-ACNC: <2 MIU/ML
BASOPHILS # BLD AUTO: 0.02 THOUSANDS/ΜL (ref 0–0.1)
BASOPHILS NFR BLD AUTO: 0 % (ref 0–1)
BILIRUB DIRECT SERPL-MCNC: 0.08 MG/DL (ref 0–0.2)
BILIRUB SERPL-MCNC: 0.22 MG/DL (ref 0.2–1)
BUN SERPL-MCNC: 6 MG/DL (ref 5–25)
CALCIUM SERPL-MCNC: 8.7 MG/DL (ref 8.3–10.1)
CHLORIDE SERPL-SCNC: 105 MMOL/L (ref 100–108)
CO2 SERPL-SCNC: 25 MMOL/L (ref 21–32)
CREAT SERPL-MCNC: 0.76 MG/DL (ref 0.6–1.3)
EOSINOPHIL # BLD AUTO: 0.01 THOUSAND/ΜL (ref 0–0.61)
EOSINOPHIL NFR BLD AUTO: 0 % (ref 0–6)
ERYTHROCYTE [DISTWIDTH] IN BLOOD BY AUTOMATED COUNT: 17.6 % (ref 11.6–15.1)
GFR SERPL CREATININE-BSD FRML MDRD: 102 ML/MIN/1.73SQ M
GLUCOSE SERPL-MCNC: 117 MG/DL (ref 65–140)
HCT VFR BLD AUTO: 41.1 % (ref 34.8–46.1)
HGB BLD-MCNC: 12.7 G/DL (ref 11.5–15.4)
IMM GRANULOCYTES # BLD AUTO: 0.02 THOUSAND/UL (ref 0–0.2)
IMM GRANULOCYTES NFR BLD AUTO: 0 % (ref 0–2)
LYMPHOCYTES # BLD AUTO: 1.43 THOUSANDS/ΜL (ref 0.6–4.47)
LYMPHOCYTES NFR BLD AUTO: 24 % (ref 14–44)
MCH RBC QN AUTO: 23.8 PG (ref 26.8–34.3)
MCHC RBC AUTO-ENTMCNC: 30.9 G/DL (ref 31.4–37.4)
MCV RBC AUTO: 77 FL (ref 82–98)
MONOCYTES # BLD AUTO: 0.29 THOUSAND/ΜL (ref 0.17–1.22)
MONOCYTES NFR BLD AUTO: 5 % (ref 4–12)
NEUTROPHILS # BLD AUTO: 4.31 THOUSANDS/ΜL (ref 1.85–7.62)
NEUTS SEG NFR BLD AUTO: 71 % (ref 43–75)
NRBC BLD AUTO-RTO: 0 /100 WBCS
P AXIS: 75 DEGREES
PLATELET # BLD AUTO: 282 THOUSANDS/UL (ref 149–390)
PMV BLD AUTO: 9.8 FL (ref 8.9–12.7)
POTASSIUM SERPL-SCNC: 3.3 MMOL/L (ref 3.5–5.3)
PR INTERVAL: 158 MS
PROT SERPL-MCNC: 8.8 G/DL (ref 6.4–8.2)
QRS AXIS: 49 DEGREES
QRSD INTERVAL: 92 MS
QT INTERVAL: 370 MS
QTC INTERVAL: 434 MS
RBC # BLD AUTO: 5.34 MILLION/UL (ref 3.81–5.12)
SODIUM SERPL-SCNC: 138 MMOL/L (ref 136–145)
T WAVE AXIS: 71 DEGREES
VENTRICULAR RATE: 83 BPM
WBC # BLD AUTO: 6.08 THOUSAND/UL (ref 4.31–10.16)

## 2020-12-01 PROCEDURE — U0003 INFECTIOUS AGENT DETECTION BY NUCLEIC ACID (DNA OR RNA); SEVERE ACUTE RESPIRATORY SYNDROME CORONAVIRUS 2 (SARS-COV-2) (CORONAVIRUS DISEASE [COVID-19]), AMPLIFIED PROBE TECHNIQUE, MAKING USE OF HIGH THROUGHPUT TECHNOLOGIES AS DESCRIBED BY CMS-2020-01-R: HCPCS | Performed by: EMERGENCY MEDICINE

## 2020-12-01 PROCEDURE — 80048 BASIC METABOLIC PNL TOTAL CA: CPT | Performed by: EMERGENCY MEDICINE

## 2020-12-01 PROCEDURE — 93005 ELECTROCARDIOGRAM TRACING: CPT

## 2020-12-01 PROCEDURE — 96361 HYDRATE IV INFUSION ADD-ON: CPT

## 2020-12-01 PROCEDURE — 99285 EMERGENCY DEPT VISIT HI MDM: CPT | Performed by: EMERGENCY MEDICINE

## 2020-12-01 PROCEDURE — 71045 X-RAY EXAM CHEST 1 VIEW: CPT

## 2020-12-01 PROCEDURE — 80076 HEPATIC FUNCTION PANEL: CPT | Performed by: EMERGENCY MEDICINE

## 2020-12-01 PROCEDURE — 84702 CHORIONIC GONADOTROPIN TEST: CPT | Performed by: EMERGENCY MEDICINE

## 2020-12-01 PROCEDURE — 96374 THER/PROPH/DIAG INJ IV PUSH: CPT

## 2020-12-01 PROCEDURE — 93010 ELECTROCARDIOGRAM REPORT: CPT | Performed by: INTERNAL MEDICINE

## 2020-12-01 PROCEDURE — 36415 COLL VENOUS BLD VENIPUNCTURE: CPT | Performed by: EMERGENCY MEDICINE

## 2020-12-01 PROCEDURE — 85025 COMPLETE CBC W/AUTO DIFF WBC: CPT | Performed by: EMERGENCY MEDICINE

## 2020-12-01 PROCEDURE — 99285 EMERGENCY DEPT VISIT HI MDM: CPT

## 2020-12-01 RX ORDER — KETOROLAC TROMETHAMINE 30 MG/ML
15 INJECTION, SOLUTION INTRAMUSCULAR; INTRAVENOUS ONCE
Status: COMPLETED | OUTPATIENT
Start: 2020-12-01 | End: 2020-12-01

## 2020-12-01 RX ORDER — POTASSIUM CHLORIDE 20 MEQ/1
20 TABLET, EXTENDED RELEASE ORAL ONCE
Status: COMPLETED | OUTPATIENT
Start: 2020-12-01 | End: 2020-12-01

## 2020-12-01 RX ORDER — ONDANSETRON 4 MG/1
4 TABLET, ORALLY DISINTEGRATING ORAL EVERY 6 HOURS PRN
Qty: 20 TABLET | Refills: 0 | Status: SHIPPED | OUTPATIENT
Start: 2020-12-01 | End: 2021-03-23

## 2020-12-01 RX ORDER — IBUPROFEN 800 MG/1
800 TABLET ORAL 3 TIMES DAILY
Qty: 21 TABLET | Refills: 0 | Status: SHIPPED | OUTPATIENT
Start: 2020-12-01 | End: 2020-12-04

## 2020-12-01 RX ORDER — ONDANSETRON 2 MG/ML
1 INJECTION INTRAMUSCULAR; INTRAVENOUS ONCE
Status: COMPLETED | OUTPATIENT
Start: 2020-12-01 | End: 2020-12-01

## 2020-12-01 RX ADMIN — POTASSIUM CHLORIDE 20 MEQ: 1500 TABLET, EXTENDED RELEASE ORAL at 12:17

## 2020-12-01 RX ADMIN — KETOROLAC TROMETHAMINE 15 MG: 30 INJECTION, SOLUTION INTRAMUSCULAR at 11:37

## 2020-12-01 RX ADMIN — SODIUM CHLORIDE 1000 ML: 0.9 INJECTION, SOLUTION INTRAVENOUS at 11:37

## 2020-12-02 ENCOUNTER — TELEMEDICINE (OUTPATIENT)
Dept: INTERNAL MEDICINE CLINIC | Facility: CLINIC | Age: 35
End: 2020-12-02

## 2020-12-02 DIAGNOSIS — U07.1 COVID-19: Primary | ICD-10-CM

## 2020-12-02 LAB — SARS-COV-2 RNA SPEC QL NAA+PROBE: DETECTED

## 2020-12-02 PROCEDURE — G2012 BRIEF CHECK IN BY MD/QHP: HCPCS | Performed by: INTERNAL MEDICINE

## 2020-12-03 ENCOUNTER — TELEPHONE (OUTPATIENT)
Dept: INTERNAL MEDICINE CLINIC | Facility: CLINIC | Age: 35
End: 2020-12-03

## 2020-12-04 ENCOUNTER — TELEMEDICINE (OUTPATIENT)
Dept: INTERNAL MEDICINE CLINIC | Facility: CLINIC | Age: 35
End: 2020-12-04

## 2020-12-04 VITALS — HEIGHT: 63 IN | BODY MASS INDEX: 51.91 KG/M2 | WEIGHT: 293 LBS

## 2020-12-04 DIAGNOSIS — J45.901 ASTHMA WITH ACUTE EXACERBATION, UNSPECIFIED ASTHMA SEVERITY, UNSPECIFIED WHETHER PERSISTENT: ICD-10-CM

## 2020-12-04 DIAGNOSIS — U07.1 COVID-19: Primary | ICD-10-CM

## 2020-12-04 PROCEDURE — 3008F BODY MASS INDEX DOCD: CPT | Performed by: INTERNAL MEDICINE

## 2020-12-04 PROCEDURE — G2012 BRIEF CHECK IN BY MD/QHP: HCPCS | Performed by: PHYSICIAN ASSISTANT

## 2020-12-07 ENCOUNTER — TELEMEDICINE (OUTPATIENT)
Dept: INTERNAL MEDICINE CLINIC | Facility: CLINIC | Age: 35
End: 2020-12-07

## 2020-12-07 DIAGNOSIS — U07.1 COVID-19 VIRUS INFECTION: Primary | ICD-10-CM

## 2020-12-07 DIAGNOSIS — J45.901 ASTHMA WITH ACUTE EXACERBATION, UNSPECIFIED ASTHMA SEVERITY, UNSPECIFIED WHETHER PERSISTENT: ICD-10-CM

## 2020-12-07 PROCEDURE — G2012 BRIEF CHECK IN BY MD/QHP: HCPCS | Performed by: PHYSICIAN ASSISTANT

## 2020-12-07 RX ORDER — PREDNISONE 20 MG/1
40 TABLET ORAL DAILY
Qty: 10 TABLET | Refills: 0 | Status: SHIPPED | OUTPATIENT
Start: 2020-12-07 | End: 2020-12-12

## 2020-12-10 ENCOUNTER — TELEPHONE (OUTPATIENT)
Dept: INTERNAL MEDICINE CLINIC | Facility: CLINIC | Age: 35
End: 2020-12-10

## 2020-12-24 ENCOUNTER — TELEPHONE (OUTPATIENT)
Dept: INTERNAL MEDICINE CLINIC | Facility: CLINIC | Age: 35
End: 2020-12-24

## 2021-02-02 ENCOUNTER — TELEPHONE (OUTPATIENT)
Dept: MULTI SPECIALTY CLINIC | Facility: CLINIC | Age: 36
End: 2021-02-02

## 2021-02-08 DIAGNOSIS — G89.29 CHRONIC INTRACTABLE HEADACHE, UNSPECIFIED HEADACHE TYPE: ICD-10-CM

## 2021-02-08 DIAGNOSIS — R06.02 SOB (SHORTNESS OF BREATH): ICD-10-CM

## 2021-02-08 DIAGNOSIS — R51.9 CHRONIC INTRACTABLE HEADACHE, UNSPECIFIED HEADACHE TYPE: ICD-10-CM

## 2021-02-08 DIAGNOSIS — R73.03 PREDIABETES: ICD-10-CM

## 2021-02-08 DIAGNOSIS — J45.901 ASTHMA WITH ACUTE EXACERBATION, UNSPECIFIED ASTHMA SEVERITY, UNSPECIFIED WHETHER PERSISTENT: ICD-10-CM

## 2021-02-09 RX ORDER — RIBOFLAVIN (VITAMIN B2) 400 MG
TABLET ORAL
Qty: 30 TABLET | Refills: 0 | Status: SHIPPED | OUTPATIENT
Start: 2021-02-09 | End: 2021-04-06 | Stop reason: SDUPTHER

## 2021-02-09 RX ORDER — ALBUTEROL SULFATE 2.5 MG/3ML
SOLUTION RESPIRATORY (INHALATION)
Qty: 75 ML | Refills: 1 | Status: SHIPPED | OUTPATIENT
Start: 2021-02-09 | End: 2021-04-05

## 2021-02-09 RX ORDER — METFORMIN HYDROCHLORIDE 500 MG/1
TABLET, EXTENDED RELEASE ORAL
Qty: 30 TABLET | Refills: 1 | Status: SHIPPED | OUTPATIENT
Start: 2021-02-09 | End: 2021-07-15

## 2021-03-02 ENCOUNTER — OFFICE VISIT (OUTPATIENT)
Dept: INTERNAL MEDICINE CLINIC | Facility: CLINIC | Age: 36
End: 2021-03-02

## 2021-03-02 VITALS
HEART RATE: 80 BPM | TEMPERATURE: 98.2 F | DIASTOLIC BLOOD PRESSURE: 96 MMHG | BODY MASS INDEX: 56.22 KG/M2 | WEIGHT: 293 LBS | SYSTOLIC BLOOD PRESSURE: 141 MMHG

## 2021-03-02 DIAGNOSIS — R51.9 CHRONIC NONINTRACTABLE HEADACHE, UNSPECIFIED HEADACHE TYPE: ICD-10-CM

## 2021-03-02 DIAGNOSIS — G89.29 CHRONIC NONINTRACTABLE HEADACHE, UNSPECIFIED HEADACHE TYPE: ICD-10-CM

## 2021-03-02 DIAGNOSIS — E66.01 MORBID OBESITY DUE TO EXCESS CALORIES (HCC): ICD-10-CM

## 2021-03-02 DIAGNOSIS — R51.9 CHRONIC NONINTRACTABLE HEADACHE, UNSPECIFIED HEADACHE TYPE: Primary | ICD-10-CM

## 2021-03-02 DIAGNOSIS — J45.40 MODERATE PERSISTENT ASTHMA WITHOUT COMPLICATION: ICD-10-CM

## 2021-03-02 DIAGNOSIS — R73.03 PREDIABETES: ICD-10-CM

## 2021-03-02 DIAGNOSIS — G89.29 CHRONIC NONINTRACTABLE HEADACHE, UNSPECIFIED HEADACHE TYPE: Primary | ICD-10-CM

## 2021-03-02 PROCEDURE — 99215 OFFICE O/P EST HI 40 MIN: CPT | Performed by: INTERNAL MEDICINE

## 2021-03-02 RX ORDER — TOPIRAMATE 25 MG/1
TABLET ORAL
Qty: 30 TABLET | Refills: 2 | Status: SHIPPED | OUTPATIENT
Start: 2021-03-02 | End: 2021-03-02 | Stop reason: SDUPTHER

## 2021-03-02 RX ORDER — TOPIRAMATE 25 MG/1
25 TABLET ORAL DAILY
Qty: 30 TABLET | Refills: 2 | Status: SHIPPED | OUTPATIENT
Start: 2021-03-02 | End: 2021-03-02

## 2021-03-02 RX ORDER — TOPIRAMATE 25 MG/1
25 TABLET ORAL DAILY
Qty: 30 TABLET | Refills: 1 | Status: SHIPPED | OUTPATIENT
Start: 2021-03-02 | End: 2021-04-06 | Stop reason: SDUPTHER

## 2021-03-02 RX ORDER — FLUTICASONE PROPIONATE AND SALMETEROL XINAFOATE 45; 21 UG/1; UG/1
2 AEROSOL, METERED RESPIRATORY (INHALATION) 2 TIMES DAILY
Qty: 3 INHALER | Refills: 3 | Status: SHIPPED | OUTPATIENT
Start: 2021-03-02 | End: 2021-03-02

## 2021-03-02 RX ORDER — TOPIRAMATE 25 MG/1
TABLET ORAL
Qty: 30 TABLET | Refills: 2 | Status: SHIPPED | OUTPATIENT
Start: 2021-03-02 | End: 2021-03-02

## 2021-03-02 RX ORDER — ALBUTEROL SULFATE 90 UG/1
2 AEROSOL, METERED RESPIRATORY (INHALATION) EVERY 6 HOURS PRN
Qty: 6.7 G | Refills: 3 | Status: SHIPPED | OUTPATIENT
Start: 2021-03-02 | End: 2021-09-01

## 2021-03-02 RX ORDER — FLUTICASONE PROPIONATE AND SALMETEROL XINAFOATE 45; 21 UG/1; UG/1
2 AEROSOL, METERED RESPIRATORY (INHALATION) 2 TIMES DAILY
Qty: 24 INHALER | Refills: 3 | Status: SHIPPED | OUTPATIENT
Start: 2021-03-02

## 2021-03-02 NOTE — PROGRESS NOTES
ASSESSMENT/PLAN:  Diagnoses and all orders for this visit:    Chronic Nonintractable Headache  Patient complaining of continued and ongoing right occipital pain  Pain initially contributed to cervical tension due to increased paraspinal hypertonicity on exam 8/2020 and was relieved with flexeril  However, after being diagnosed with COVID on 11/24/2021, the pain has becoming increasing worse and now is present 24/7 and unrelieved with medication  Pain is associated with lightheadedness/dizziness, photophobia, and pulsatile orbital pain and tinnitis/whoosing  These symptoms have occurred alongside an 80 lbs weight gain over the past year  Family history is significant for cerebral aneurysm in her grandmother  On physical exam, absence of paraspinal hypertonicity with intact cervical ROM  No FND with intact muscle strength and sensation b/l  At this time, there is concern for possible pseuodotumor cerebri and unlikely aneurysm  Therefore, will precede with MRI imaging and will begin patient on Topiramate  · Start Topiramate 25 mg daily; script sent to patient's pharmacy  · MRI Brain w/o contrast ordered   · Patient instructed to call the office if her symptoms worsen or to report to the ED if she begins to experiences acutely worsened and unbearable pain    Moderate Persistent Asthma  Patient complains of increased SOB and wheezing with exertion  Symptoms have escalated following COVID-19 infection in 11/2020  She has been treated with steroids x2 since COVID diagnosis  Currrently, she is using her albuterol inhaler multiple times a day  She has been treated with Advair in the recent past but has had difficulty getting it refilled  Patient currently in no acute distress or discomfort with good respiratory effort or accessory muscle use  She does have end expiratory wheezing and a prolonged expiratory phase  She is not currently in an asthma exacerbation but will require escalation of care  Will restart on Advair  · Continue Albuterol inhaler PRN for wheezing  · Restart Advair HFA; script sent to patient's pharmacy    Morbid Obesity (BMI: 56 22)  Patient has experienced an 80 lb weight gain over the course of the last year after discontinuing her OTC weight loss supplements  TSH WNL  HCG negative  Prolactin WNL  Patient actively working on dieting and exercising but her asthma and migraines have been limited her physical activity abilities  2/26/2020: 240 lbs  11/19/2021: 320 8 lbs  3/2/2021: 317    · Patient previously referred to weight management  · Educated on the importance of diet and exercise  · Start Topiramate 25 mg daily; script sent to patient's pharmacy    Prediabetes:  Most recent A1c 5 9  Currently denying polydipsia, polyuria, or polyphagia  Does complain of lightheadedness/dizziness in the setting of ongoing, unrelieved migraines  · Continue Metformin  mg   · Repeat Hemoglobin A1c pending  · Educated on the importance of diet and exercise    Health Maintenance:   Labs:  o Hemoglobin A1c: 5 9; repeat ordered  o HIV: Negative  o Hepatitis C: Negative   Cancer Screening:  o Cervical: Negative PAP in 2018   Vaccinations:  o Influenza: UTD  o Tdap: UTD   Other:  o PHQ9: UTD  o BMI: UTD    Schedule a follow-up appointment 1 month for chronic migraines and asthma  CHIEF COMPLAINT:  Follow-up of chronic medical conditions    HISTORY OF PRESENT ILLNESS:  Ms Jayleen Heath is a 80-year-old female with past medical history of moderate persistent asthma , prediabetes (A1c 5 9), and morbid obesity (BMI 56 22) who presents to the clinic today for follow-up chronic medical conditions  Patient initially presented 10/12/2020 with complaint of chronic headaches that had been ongoing for 2 months  She did experience daily headaches primary located in her right occiput with associated dizziness, photo/phonophobia, and bilateral orbital pressure    At that time, patient had increased paraspinal cervical hypertonicity and was started on Flexeril, riboflavin, and naproxen with improvement in pain  When seen and examined 11/19/2020, patient noted significant improvement in her headaches  Headaches occurred 2 to 3 times a week and was relieved with Advil  Today, patient explains that her headaches have become progressively worse since the end of November when she was diagnosed with COVID (11/24/2020)  Patient now complaining of daily 24/7 headaches unrelieved with Advil and minimal improvement with Flexeril  Headaches are primarily located in her right occipital region  Pain increased with light  Not associated with nausea/vomiting  Patient does admit to lightheadedness/dizziness along with occasional orbital pulsating pain and tinnitus/whooshing sounds  The symptoms have occurred along side significant weight gain of approx 80 lbs over the course of the last year  These headaches have been a new occurrence for her over the course of the past year  She does admit to a family history of cerebral aneurysms in her grandmother  Additionally, since her diagnosis of COVID-19 patient has experienced worsening SOB requiring two five day course of high dose steroids in December and February  Currently, patient c/o increased SOB and wheezing with exertion and has required daily and repetitive use of her albuterol PRN inhaler multiple times a day  Patient's asthma was momentarily controlled on Advair HFA, but the patient has not been taking this medication lately as she has had a difficult time getting it filled at the pharmacy  The following portions of the patient's history were reviewed and updated as appropriate: allergies, current medications, past family history, past medical history, past social history, past surgical history and problem list     Review of Systems   Constitutional: Positive for activity change, fatigue and unexpected weight change (80 lbs weight gain in 1 year)   Negative for chills, diaphoresis and fever  HENT: Positive for tinnitus  Negative for sinus pressure and sinus pain  Eyes: Positive for photophobia and pain (intermittent pulsatile right orbital pain)  Negative for visual disturbance  Respiratory: Positive for wheezing  Negative for cough, chest tightness and shortness of breath  Cardiovascular: Negative for chest pain, palpitations and leg swelling  Gastrointestinal: Negative for abdominal distention, constipation, nausea and vomiting  Endocrine: Negative for polyuria  Genitourinary: Negative for dysuria  Musculoskeletal: Negative for back pain, gait problem and neck pain  Skin: Negative for pallor  Neurological: Positive for dizziness, light-headedness and headaches  Negative for syncope, weakness and numbness  Psychiatric/Behavioral: Negative for agitation and confusion  OBJECTIVE:  Vitals:    03/02/21 1004   BP: 141/96   BP Location: Other (Comment)   Patient Position: Sitting   Cuff Size: Standard   Pulse: 80   Temp: 98 2 °F (36 8 °C)   TempSrc: Temporal   Weight: (!) 144 kg (317 lb 6 4 oz)     Physical Exam  Constitutional:       General: She is not in acute distress  Appearance: Normal appearance  She is obese  She is not ill-appearing  HENT:      Head: Normocephalic and atraumatic  Nose: Nose normal  No congestion  Mouth/Throat:      Mouth: Mucous membranes are moist       Pharynx: Oropharynx is clear  Eyes:      General: No scleral icterus  Extraocular Movements: Extraocular movements intact  Conjunctiva/sclera: Conjunctivae normal    Neck:      Musculoskeletal: Normal range of motion  No neck rigidity  Comments: No paraspinal hypertonicity noted  Cardiovascular:      Rate and Rhythm: Normal rate and regular rhythm  Pulses: Normal pulses  Heart sounds: Normal heart sounds  No murmur  Pulmonary:      Effort: Pulmonary effort is normal       Breath sounds: Wheezing present  Comments:  In no acute distress or discomfort  Good respiratory effort with no accessory muscle use  End expiratory wheezing with a prolonged expiratory phase  Abdominal:      General: Abdomen is flat  Bowel sounds are normal       Tenderness: There is no abdominal tenderness  Musculoskeletal: Normal range of motion  Right lower leg: No edema  Left lower leg: No edema  Skin:     General: Skin is dry  Capillary Refill: Capillary refill takes less than 2 seconds  Neurological:      General: No focal deficit present  Mental Status: She is alert and oriented to person, place, and time  Mental status is at baseline  Cranial Nerves: No cranial nerve deficit  Sensory: No sensory deficit  Motor: No weakness        Gait: Gait normal    Psychiatric:         Mood and Affect: Mood normal          Behavior: Behavior normal            Current Outpatient Medications:     albuterol (2 5 mg/3 mL) 0 083 % nebulizer solution, TAKE 1 VIAL BY NEBULIZATION EVERY 6 HOURS AS NEEDED FOR WHEEZING OR SHORTNESS OF BREATH, Disp: 75 mL, Rfl: 1    albuterol (Proventil HFA) 90 mcg/act inhaler, Inhale 2 puffs every 6 (six) hours as needed for wheezing, Disp: 6 7 g, Rfl: 3    benzonatate (TESSALON) 200 MG capsule, Take 1 capsule (200 mg total) by mouth 3 (three) times a day as needed for cough, Disp: 30 capsule, Rfl: 0    cyclobenzaprine (FLEXERIL) 5 mg tablet, Take 2 tablets (10 mg total) by mouth daily at bedtime, Disp: 30 tablet, Rfl: 0    fluticasone-salmeterol (Advair HFA) 45-21 MCG/ACT inhaler, Inhale 2 puffs 2 (two) times a day Rinse mouth after use , Disp: 3 Inhaler, Rfl: 3    metFORMIN (GLUCOPHAGE-XR) 500 mg 24 hr tablet, TAKE 1 TABLET BY MOUTH EVERY DAY WITH DINNER, Disp: 30 tablet, Rfl: 1    Riboflavin 400 MG TABS, TAKE 1 TABLET BY MOUTH EVERY DAY, Disp: 30 tablet, Rfl: 0    naproxen (NAPROSYN) 500 mg tablet, Take 1 tablet (500 mg total) by mouth 2 (two) times a day with meals (Patient not taking: Reported on 3/2/2021), Disp: 14 tablet, Rfl: 0    ondansetron (ZOFRAN-ODT) 4 mg disintegrating tablet, Take 1 tablet (4 mg total) by mouth every 6 (six) hours as needed for nausea or vomiting (Patient not taking: Reported on 3/2/2021), Disp: 20 tablet, Rfl: 0    topiramate (TOPAMAX) 25 mg tablet, Take 1 tablet (25 mg total) by mouth daily, Disp: 30 tablet, Rfl: 2    Past Medical History:   Diagnosis Date    Asthma      Past Surgical History:   Procedure Laterality Date     SECTION       Social History     Socioeconomic History    Marital status: /Civil Union     Spouse name: Not on file    Number of children: Not on file    Years of education: Not on file    Highest education level: Not on file   Occupational History    Not on file   Social Needs    Financial resource strain: Not on file    Food insecurity     Worry: Not on file     Inability: Not on file   [employer] needs     Medical: Not on file     Non-medical: Not on file   Tobacco Use    Smoking status: Never Smoker    Smokeless tobacco: Never Used   Substance and Sexual Activity    Alcohol use: No    Drug use: No    Sexual activity: Yes   Lifestyle    Physical activity     Days per week: Not on file     Minutes per session: Not on file    Stress: Not on file   Relationships    Social connections     Talks on phone: Not on file     Gets together: Not on file     Attends Rastafari service: Not on file     Active member of club or organization: Not on file     Attends meetings of clubs or organizations: Not on file     Relationship status: Not on file    Intimate partner violence     Fear of current or ex partner: Not on file     Emotionally abused: Not on file     Physically abused: Not on file     Forced sexual activity: Not on file   Other Topics Concern    Not on file   Social History Narrative    Social problem      Family History   Problem Relation Age of Onset    No Known Problems Other     Arthritis Mother    [facility] Fibromyalgia Mother     Diabetes Family     Cancer Family     Heart disease Family        ==  Janette Garcia DO  [facility] Internal Medicine PGY-1    [address] , [address] [address] [facility] [address], [address] [facility]  Office: [phone]  Fax: [phone]

## 2021-03-10 ENCOUNTER — HOSPITAL ENCOUNTER (OUTPATIENT)
Dept: RADIOLOGY | Facility: HOSPITAL | Age: 36
Discharge: HOME | End: 2021-03-10
Payer: COMMERCIAL

## 2021-03-10 DIAGNOSIS — R51.9 CHRONIC NONINTRACTABLE HEADACHE, UNSPECIFIED HEADACHE TYPE: ICD-10-CM

## 2021-03-10 DIAGNOSIS — G89.29 CHRONIC NONINTRACTABLE HEADACHE, UNSPECIFIED HEADACHE TYPE: ICD-10-CM

## 2021-03-10 PROCEDURE — G1004 CDSM NDSC: HCPCS

## 2021-03-10 PROCEDURE — 70551 MRI BRAIN STEM W/O DYE: CPT

## 2021-03-16 ENCOUNTER — TELEPHONE (OUTPATIENT)
Dept: INTERNAL MEDICINE CLINIC | Facility: CLINIC | Age: 36
End: 2021-03-16

## 2021-03-16 DIAGNOSIS — G93.2 PSEUDOTUMOR CEREBRI: Primary | ICD-10-CM

## 2021-03-16 RX ORDER — ACETAZOLAMIDE 250 MG/1
500 TABLET ORAL 2 TIMES DAILY
Qty: 40 TABLET | Refills: 2 | Status: SHIPPED | OUTPATIENT
Start: 2021-03-16 | End: 2021-04-06

## 2021-03-16 NOTE — TELEPHONE ENCOUNTER
Patient called, it has been over a week since her MRI results have been in [address]  She would like to discuss the results with her doctor  I did inform the patient that Dr Dobson Service is not in the office this week  Patient requests that the covering doctor review and advise

## 2021-03-16 NOTE — PROGRESS NOTES
Patient seen and examined in the clinic on 3/4/2021 by myself  At that time, patient complaining of worsening headaches with associated pulsatile orbital pain and tinnitis/whoosing  Concerned for pseudotumor cerebri and therefore started patient on Topiramate and sent for MRI imaging  Patient had an MRI brain completed which revealed, "Prominent CSF within the optic nerve sheaths identified with prominent partially of the sella turcica  In the setting of headache, consideration of idiopathic intracranial hypertension (pseuotumor cerebri) should be considered "    I personally called the patient to discuss the results with her today  Patient understanding and agreeable to begin treatment with Acetazolamide while continuing her weight loss journey  Patient willing to follow up in the office to ensure improvement in symptoms following medication initiation  Plan:  · Start Acetazolamide 500 mg BID; script sent to patient's pharmacy  · Continue Topiramate 25 mg qdaily  · Advised continued weight loss and lifestyle modifications; patient previously referred to weight management   · Follow up with patient next time I am in clinic; message sent to clinic staff to call and schedule an appointment        Janette Garcia DO, PGY-1  [facility] Internal Medicine Residency

## 2021-03-16 NOTE — TELEPHONE ENCOUNTER
Called patient to discuss resent and sent as script for Acetazolamide to patient's pharmacy  Please call patient to schedule a follow up appointment with myself next time I am in office for follow up  Thank you so much and enjoy your day

## 2021-03-23 ENCOUNTER — TELEPHONE (OUTPATIENT)
Dept: INTERNAL MEDICINE CLINIC | Facility: CLINIC | Age: 36
End: 2021-03-23

## 2021-03-23 ENCOUNTER — OFFICE VISIT (OUTPATIENT)
Dept: INTERNAL MEDICINE CLINIC | Facility: CLINIC | Age: 36
End: 2021-03-23

## 2021-03-23 VITALS
HEART RATE: 94 BPM | SYSTOLIC BLOOD PRESSURE: 137 MMHG | TEMPERATURE: 98.1 F | WEIGHT: 293 LBS | DIASTOLIC BLOOD PRESSURE: 97 MMHG | BODY MASS INDEX: 54.81 KG/M2

## 2021-03-23 DIAGNOSIS — G93.2 IIH (IDIOPATHIC INTRACRANIAL HYPERTENSION): Primary | ICD-10-CM

## 2021-03-23 PROCEDURE — 99213 OFFICE O/P EST LOW 20 MIN: CPT | Performed by: INTERNAL MEDICINE

## 2021-03-23 RX ORDER — INDOMETHACIN 75 MG/1
75 CAPSULE, EXTENDED RELEASE ORAL
Qty: 30 CAPSULE | Refills: 1 | Status: SHIPPED | OUTPATIENT
Start: 2021-03-23 | End: 2021-04-06 | Stop reason: SDUPTHER

## 2021-03-23 RX ORDER — FERROUS SULFATE TAB EC 324 MG (65 MG FE EQUIVALENT) 324 (65 FE) MG
324 TABLET DELAYED RESPONSE ORAL EVERY OTHER DAY
Qty: 30 TABLET | Refills: 3 | Status: SHIPPED | OUTPATIENT
Start: 2021-03-23 | End: 2021-04-06 | Stop reason: SDUPTHER

## 2021-03-23 NOTE — TELEPHONE ENCOUNTER
Patient needs to know how many days she should be out of work  Please write letter for work  Email is [email or web address]-please email work note to this email

## 2021-03-23 NOTE — PATIENT INSTRUCTIONS
Idiopathic Intracranial Hypertension   WHAT YOU NEED TO KNOW:   IIH is a condition that causes the pressure inside your skull to be higher than normal for no known reason  IIH can seem like a brain tumor, but no tumor is found  IIH is most common in obese women who are of childbearing age  DISCHARGE INSTRUCTIONS:   Call 911 for any of the following:   · You suddenly cannot see  · You have sudden neck pain or cannot move your arms or legs  · You have sudden trouble breathing  · You are confused or cannot think clearly  Return to the emergency department if:   · You have a severe headache  · You have a seizure  Contact your healthcare provider or specialist if:   · You have a fever  · Your headache gets worse or does not go away with treatment  · Your vision loss does not improve with treatment  · You have questions or concerns about your condition or care  Medicines: You may need any of the following:  · Migraine medicine  may help decrease how much CSF you produce  This will help relieve pressure in your skull  · NSAIDs , such as ibuprofen, help decrease swelling, pain, and fever  This medicine is available with or without a doctor's order  NSAIDs can cause stomach bleeding or kidney problems in certain people  If you take blood thinner medicine, always ask if NSAIDs are safe for you  Always read the medicine label and follow directions  Do not give these medicines to children under 10months of age without direction from your child's healthcare provider  · Acetaminophen  decreases pain and fever  It is available without a doctor's order  Ask how much to take and how often to take it  Follow directions  Read the labels of all other medicines you are using to see if they also contain acetaminophen, or ask your doctor or pharmacist  Acetaminophen can cause liver damage if not taken correctly  Do not use more than 4 grams (4,000 milligrams) total of acetaminophen in one day  · Diuretics  help decrease extra fluid that collects in your body  This will help lower the pressure in your skull  Diuretics are often called water pills  You may urinate more often when you take this medicine  · Prescription pain medicine  may be given  Ask your healthcare provider how to take this medicine safely  Some prescription pain medicines contain acetaminophen  Do not take other medicines that contain acetaminophen without talking to your healthcare provider  Too much acetaminophen may cause liver damage  Prescription pain medicine may cause constipation  Ask your healthcare provider how to prevent or treat constipation  · Take your medicine as directed  Contact your healthcare provider if you think your medicine is not helping or if you have side effects  Tell him or her if you are allergic to any medicine  Keep a list of the medicines, vitamins, and herbs you take  Include the amounts, and when and why you take them  Bring the list or the pill bottles to follow-up visits  Carry your medicine list with you in case of an emergency  Manage IIH:   · Maintain a healthy weight  Ask your healthcare provider how much you should weigh  Ask your provider to help you create a weight loss plan if you are overweight  · Eat a variety of healthy foods  You may need to limit the amount of fats and salt you eat  You may also need to limit foods rich in vitamin A and tyramine  Foods rich in vitamin A include beef liver, sweet potatoes, carrots, tomatoes, and leafy greens  Food and drinks that are high in tyramine include cheese, pepperoni, salami, beer, and wine  Ask if you need to be on a special diet  · Drink liquids as directed  Ask your healthcare provider how much liquid to drink each day and which liquids are best for you  Follow up with your healthcare provider or specialist as directed: You may need to return for eye exams every 10 to 14 days   Write down your questions so you remember to ask them during your visits  © Copyright [address] [place] [address] Drive Information is for End User's use only and may not be sold, redistributed or otherwise used for commercial purposes  All illustrations and images included in CareNotes® are the copyrighted property of A D A M , [employer]  or Ruth Partida  The above information is an  only  It is not intended as medical advice for individual conditions or treatments  Talk to your doctor, nurse or pharmacist before following any medical regimen to see if it is safe and effective for you

## 2021-03-24 ENCOUNTER — TELEPHONE (OUTPATIENT)
Dept: NEUROLOGY | Facility: CLINIC | Age: 36
End: 2021-03-24

## 2021-03-24 NOTE — TELEPHONE ENCOUNTER
Best contact number for patient:  [phone]  Emergency Contact name and number:  June Stark  Referring provider and telephone number:    Primary Care Provider Name and if affiliated with [facility]:     Reason for Appointment/Dx:  Idiopathic intracranial hypertension/pressure in head  Have you seen and followed up with a pediatric Neurologist for this disease in the past?  No (If yes ok to schedule with Dr Melva Lujan)    Neurology Location patient would like to be seen:    Order received? Yes                                                 Records Received? Yes    Have you ever seen another Neurologist?       No    Insurance Information    Insurance Name:  [address]  ID/Policy #:    Secondary Insurance:    ID/Policy#: Workman's Comp/ Accident/ School  Information      Workman's Comp/Accident/School related? No    If yes name of Insurance company:    Date of Injury:    Type of Injury:    [address] Name and Telephone Number:    Notes:                   Appointment date:   New pt appt made with Dr Tonny White for 6/3 @ 11a - new pt ppwk mailed to home   Placed on cancellation list

## 2021-03-25 ENCOUNTER — APPOINTMENT (OUTPATIENT)
Dept: LAB | Facility: HOSPITAL | Age: 36
End: 2021-03-25
Payer: COMMERCIAL

## 2021-03-25 DIAGNOSIS — G93.2 IIH (IDIOPATHIC INTRACRANIAL HYPERTENSION): ICD-10-CM

## 2021-03-25 LAB
FERRITIN SERPL-MCNC: 24 NG/ML (ref 8–388)
HCG SERPL QL: NEGATIVE
IRON SATN MFR SERPL: 7 %
IRON SERPL-MCNC: 26 UG/DL (ref 50–170)
TIBC SERPL-MCNC: 379 UG/DL (ref 250–450)

## 2021-03-25 PROCEDURE — 36415 COLL VENOUS BLD VENIPUNCTURE: CPT

## 2021-03-25 PROCEDURE — 84703 CHORIONIC GONADOTROPIN ASSAY: CPT

## 2021-03-25 PROCEDURE — 82728 ASSAY OF FERRITIN: CPT

## 2021-03-25 PROCEDURE — 83550 IRON BINDING TEST: CPT

## 2021-03-25 PROCEDURE — 83540 ASSAY OF IRON: CPT

## 2021-03-25 NOTE — PROGRESS NOTES
INTERNAL MEDICINE FOLLOW-UP OFFICE VISIT  [facility]  [address] [place] [address]    NAME: Kanchan Dominguez  AGE: 28 y o  SEX: female    DATE OF ENCOUNTER: 3/24/2021    Assessment and Plan     IIH:   Suspected diagnosis based on symptomatology and imaging findings  Patient without any visual deficits, double vision, decreased field of vision at this time  Potentially with papilledema on exam this may be somewhat limited  Will continue Topamax and Diamox as well as initiating indomethacin  Will also refer to Neurology urgently for further evaluation and management  Anemia: Likely iron deficiency at the setting of moderately decreased MCV  This may be related to her IIH  So will initiate iron replacement p o  and have ordered iron studies  Amenorrhea:  Patient reports she is coming up on 2 months since her last menstrual period  This is happened in the past, her menses have been somewhat irregular  She has a history of a bilateral tubal ligation  Currently denies any abdominal pain  - UPT ordered, will follow with results  Orders Placed This Encounter   Procedures    Pregnancy Test (HCG Qualitative)    Ambulatory referral to Neurology           Chief Complaint     Chief Complaint   Patient presents with    Medication Problem    Headache     feeling pressure in her head       History of Present Illness     HPI       [de-identified] 11year-old female presents for short or follow-up  She was seen in early March complaining of chronic headache   Associated with lightheadedness, dizziness, photophobia, tinnitus in the setting of a recent 80 lb weight gain  MRI was ordered  And patient was started on topiramate and ultimately Diamox  MRI demonstrated potential I IH  Patient reports compliance with above-noted medications  She reports her headache has continued at this time and she continues to report dizziness, photophobia, phonophobia    Denies any focal neuro deficits or visual field defects  She has no other acute complaints at this time, though does note that she has not had a period in 2 months  The following portions of the patient's history were reviewed and updated as appropriate: allergies, current medications, past family history, past medical history, past social history, past surgical history and problem list     Review of Systems     Review of Systems   Constitutional: Negative for activity change, appetite change, chills, diaphoresis, fatigue, fever and unexpected weight change  HENT: Negative for congestion, dental problem, drooling, ear discharge, ear pain, facial swelling, hearing loss, mouth sores, nosebleeds, postnasal drip and rhinorrhea  Eyes: Negative for photophobia, pain, discharge, redness, itching and visual disturbance  Respiratory: Negative for apnea, cough, choking, chest tightness, shortness of breath, wheezing and stridor  Cardiovascular: Negative for chest pain, palpitations and leg swelling  Gastrointestinal: Negative for abdominal distention, abdominal pain, anal bleeding, blood in stool, constipation and diarrhea  Musculoskeletal: Negative for arthralgias, back pain, gait problem, joint swelling, myalgias, neck pain and neck stiffness  Skin: Negative for color change, pallor, rash and wound  Neurological: Positive for dizziness, light-headedness and headaches  Negative for tremors, seizures, syncope, facial asymmetry, speech difficulty, weakness and numbness  Psychiatric/Behavioral: Negative for agitation, behavioral problems, confusion, decreased concentration and dysphoric mood         Active Problem List     Patient Active Problem List   Diagnosis    SOB (shortness of breath)    Acute asthma exacerbation    Morbid obesity due to excess calories (HCC)    Chest tightness    Prediabetes    Mild intermittent asthma without complication    [id number]       Objective     /97 (BP Location: Other (Comment), Patient Position: Sitting, Cuff Size: Standard) Comment (BP Location): right wrist  Pulse 94   Temp 98 1 °F (36 7 °C) (Temporal)   Wt (!) 140 kg (309 lb 6 4 oz)   BMI 54 81 kg/m²     Physical Exam  Vitals signs reviewed  Constitutional:       General: She is not in acute distress  Appearance: Normal appearance  She is obese  She is not ill-appearing, toxic-appearing or diaphoretic  HENT:      Head: Normocephalic and atraumatic  Nose: Nose normal  No congestion or rhinorrhea  Eyes:      General: No scleral icterus  Right eye: No discharge  Left eye: No discharge  Extraocular Movements: Extraocular movements intact  Conjunctiva/sclera: Conjunctivae normal       Pupils: Pupils are equal, round, and reactive to light  Comments: Suspect  Mild bilateral papilledema on eye exam    Cardiovascular:      Rate and Rhythm: Normal rate and regular rhythm  Pulses: Normal pulses  Heart sounds: Normal heart sounds  No murmur  No friction rub  No gallop  Pulmonary:      Effort: Pulmonary effort is normal  No respiratory distress  Breath sounds: Normal breath sounds  No stridor  No wheezing, rhonchi or rales  Chest:      Chest wall: No tenderness  Abdominal:      General: Abdomen is flat  Bowel sounds are normal  There is no distension  Palpations: Abdomen is soft  There is no mass  Tenderness: There is no abdominal tenderness  There is no guarding or rebound  Hernia: No hernia is present  Skin:     General: Skin is warm and dry  Neurological:      General: No focal deficit present  Mental Status: She is alert and oriented to person, place, and time  Mental status is at baseline           Pertinent Laboratory/Diagnostic Studies:  CBC:   Lab Results   Component Value Date/Time    WBC 6 08 12/01/2020 11:37 AM    WBC 6 73 05/14/2015 12:20 PM    RBC 5 34 (H) 12/01/2020 11:37 AM    RBC 5 05 05/14/2015 12:20 PM    HGB 12 7 12/01/2020 11:37 AM    HGB 12 3 05/14/2015 12:20 PM    HCT 41 1 12/01/2020 11:37 AM    HCT 38 6 05/14/2015 12:20 PM    MCV 77 (L) 12/01/2020 11:37 AM    MCV 76 (L) 05/14/2015 12:20 PM    MCH 23 8 (L) 12/01/2020 11:37 AM    MCH 24 4 (L) 05/14/2015 12:20 PM    MCHC 30 9 (L) 12/01/2020 11:37 AM    MCHC 31 9 05/14/2015 12:20 PM    RDW 17 6 (H) 12/01/2020 11:37 AM    RDW 16 4 (H) 05/14/2015 12:20 PM    MPV 9 8 12/01/2020 11:37 AM    MPV 9 8 05/14/2015 12:20 PM     12/01/2020 11:37 AM     05/14/2015 12:20 PM    NRBC 0 12/01/2020 11:37 AM    NEUTOPHILPCT 71 12/01/2020 11:37 AM    NEUTOPHILPCT 55 05/14/2015 12:20 PM    LYMPHOPCT 24 12/01/2020 11:37 AM    LYMPHOPCT 29 05/14/2015 12:20 PM    MONOPCT 5 12/01/2020 11:37 AM    MONOPCT 5 05/14/2015 12:20 PM    EOSPCT 0 12/01/2020 11:37 AM    EOSPCT 11 (H) 05/14/2015 12:20 PM    BASOPCT 0 12/01/2020 11:37 AM    BASOPCT 0 09/08/2014 12:48 AM    NEUTROABS 4 31 12/01/2020 11:37 AM    NEUTROABS 3 70 05/14/2015 12:20 PM    LYMPHSABS 1 43 12/01/2020 11:37 AM    LYMPHSABS 1 95 05/14/2015 12:20 PM    MONOSABS 0 29 12/01/2020 11:37 AM    MONOSABS 0 34 05/14/2015 12:20 PM    EOSABS 0 01 12/01/2020 11:37 AM    EOSABS 0 74 (H) 05/14/2015 12:20 PM     Chemistry Profile:   Lab Results   Component Value Date/Time     05/14/2015 12:20 PM    K 3 3 (L) 12/01/2020 11:37 AM    K 3 6 05/14/2015 12:20 PM     12/01/2020 11:37 AM     05/14/2015 12:20 PM    CO2 25 12/01/2020 11:37 AM    CO2 27 05/14/2015 12:20 PM    ANIONGAP 8 05/14/2015 12:20 PM    BUN 6 12/01/2020 11:37 AM    BUN 5 05/14/2015 12:20 PM    CREATININE 0 76 12/01/2020 11:37 AM    CREATININE 0 58 05/14/2015 12:20 PM    GLUC 117 12/01/2020 11:37 AM    GLUCOSE 97 05/14/2015 12:20 PM    CALCIUM 8 7 12/01/2020 11:37 AM    CALCIUM 8 4 05/14/2015 12:20 PM    AST 21 12/01/2020 11:37 AM    ALT 25 12/01/2020 11:37 AM    ALKPHOS 96 12/01/2020 11:37 AM    EGFR 102 12/01/2020 11:37 AM     CBC:   Results from Last 12 Months   Lab Units 12/01/20  1137   WBC Thousand/uL 6 08   RBC Million/uL 5 34*   HEMOGLOBIN g/dL 12 7   HEMATOCRIT % 41 1   MCV fL 77*   MCH pg 23 8*   MCHC g/dL 30 9*   RDW % 17 6*   MPV fL 9 8   PLATELETS Thousands/uL 282   NRBC AUTO /100 WBCs 0   NEUTROS PCT % 71   LYMPHS PCT % 24   MONOS PCT % 5   EOS PCT % 0   BASOS PCT % 0   NEUTROS ABS Thousands/µL 4 31   LYMPHS ABS Thousands/µL 1 43   MONOS ABS Thousand/µL 0 29   EOS ABS Thousand/µL 0 01     Chemistry Profile:   Results from Last 12 Months   Lab Units 12/01/20  1137   POTASSIUM mmol/L 3 3*   CHLORIDE mmol/L 105   CO2 mmol/L 25   BUN mg/dL 6   CREATININE mg/dL 0 76   GLUCOSE RANDOM mg/dL 117   CALCIUM mg/dL 8 7   AST U/L 21   ALT U/L 25   ALK PHOS U/L 96   EGFR ml/min/1 73sq m 102       Current Medications     Current Outpatient Medications:     acetaZOLAMIDE (DIAMOX) 250 mg tablet, Take 2 tablets (500 mg total) by mouth 2 (two) times a day, Disp: 40 tablet, Rfl: 2    Advair HFA 45-21 MCG/ACT inhaler, INHALE 2 PUFFS 2 (TWO) TIMES A DAY RINSE MOUTH AFTER USE , Disp: 24 Inhaler, Rfl: 3    albuterol (2 5 mg/3 mL) 0 083 % nebulizer solution, TAKE 1 VIAL BY NEBULIZATION EVERY 6 HOURS AS NEEDED FOR WHEEZING OR SHORTNESS OF BREATH, Disp: 75 mL, Rfl: 1    albuterol (Proventil HFA) 90 mcg/act inhaler, Inhale 2 puffs every 6 (six) hours as needed for wheezing, Disp: 6 7 g, Rfl: 3    cyclobenzaprine (FLEXERIL) 5 mg tablet, Take 2 tablets (10 mg total) by mouth daily at bedtime, Disp: 30 tablet, Rfl: 0    metFORMIN (GLUCOPHAGE-XR) 500 mg 24 hr tablet, TAKE 1 TABLET BY MOUTH EVERY DAY WITH DINNER, Disp: 30 tablet, Rfl: 1    topiramate (TOPAMAX) 25 mg tablet, Take 1 tablet (25 mg total) by mouth daily, Disp: 30 tablet, Rfl: 1    ferrous sulfate 324 (65 Fe) mg, Take 1 tablet (324 mg total) by mouth every other day, Disp: 30 tablet, Rfl: 3    indomethacin (INDOCIN SR) 75 mg CR capsule, Take 1 capsule (75 mg total) by mouth daily with breakfast, Disp: 30 capsule, Rfl: 1   Riboflavin 400 MG TABS, TAKE 1 TABLET BY MOUTH EVERY DAY (Patient not taking: Reported on 3/23/2021), Disp: 30 tablet, Rfl: 0    Health Maintenance     Health Maintenance   Topic Date Due    COVID-19 Vaccine (1) Never done    Annual Physical  05/23/2019    Pneumococcal Vaccine: Pediatrics (0 to 5 Years) and At-Risk Patients (6 to 59 Years) (1 of 1 - PPSV23) 01/14/2021    Hepatitis B Vaccine (3 of 3 - 3-dose primary series) 01/14/2021    Influenza Vaccine (1) 06/30/2021 (Originally 9/1/2020)    DTaP,Tdap,and Td Vaccines (6 - Tdap) 10/12/2021 (Originally 8/2/1996)    Depression Screening PHQ  11/19/2021    BMI: Followup Plan  11/19/2021    BMI: Adult  03/23/2022    Cervical Cancer Screening  05/23/2023    HIV Screening  Completed    Hepatitis C Screening  Completed    HIB Vaccine  Completed    IPV Vaccine  Completed    Hepatitis A Vaccine  Aged Out    Meningococcal ACWY Vaccine  Aged Out    HPV Vaccine  Aged Out     Immunization History   Administered Date(s) Administered    DTP 1985, 1985, 01/27/1986, 03/20/1987, 04/20/1990    Hep B, Adolescent or Pediatric 03/09/2001    Hep B, adult 11/19/2020    HiB 07/25/1987    MMR 12/03/1986, 03/09/2001    OPV 1985, 1985, 12/03/1986, 04/20/1990    Pneumococcal Conjugate 13-Valent 11/19/2020       Anna Tobar  3/24/2021 11:12 PM

## 2021-04-05 DIAGNOSIS — J45.901 ASTHMA WITH ACUTE EXACERBATION, UNSPECIFIED ASTHMA SEVERITY, UNSPECIFIED WHETHER PERSISTENT: ICD-10-CM

## 2021-04-05 DIAGNOSIS — R06.02 SOB (SHORTNESS OF BREATH): ICD-10-CM

## 2021-04-05 RX ORDER — ALBUTEROL SULFATE 2.5 MG/3ML
SOLUTION RESPIRATORY (INHALATION)
Qty: 75 ML | Refills: 1 | Status: SHIPPED | OUTPATIENT
Start: 2021-04-05

## 2021-04-06 ENCOUNTER — OFFICE VISIT (OUTPATIENT)
Dept: INTERNAL MEDICINE CLINIC | Facility: CLINIC | Age: 36
End: 2021-04-06

## 2021-04-06 VITALS
TEMPERATURE: 97.8 F | HEART RATE: 79 BPM | DIASTOLIC BLOOD PRESSURE: 92 MMHG | WEIGHT: 293 LBS | BODY MASS INDEX: 56.19 KG/M2 | SYSTOLIC BLOOD PRESSURE: 144 MMHG

## 2021-04-06 DIAGNOSIS — Z23 NEED FOR HEPATITIS B VACCINATION: ICD-10-CM

## 2021-04-06 DIAGNOSIS — R73.03 PREDIABETES: ICD-10-CM

## 2021-04-06 DIAGNOSIS — G89.29 CHRONIC INTRACTABLE HEADACHE, UNSPECIFIED HEADACHE TYPE: ICD-10-CM

## 2021-04-06 DIAGNOSIS — I10 HYPERTENSION: ICD-10-CM

## 2021-04-06 DIAGNOSIS — G93.2 IIH (IDIOPATHIC INTRACRANIAL HYPERTENSION): Primary | ICD-10-CM

## 2021-04-06 DIAGNOSIS — D50.9 IRON DEFICIENCY ANEMIA: ICD-10-CM

## 2021-04-06 DIAGNOSIS — R51.9 CHRONIC INTRACTABLE HEADACHE, UNSPECIFIED HEADACHE TYPE: ICD-10-CM

## 2021-04-06 PROCEDURE — 99213 OFFICE O/P EST LOW 20 MIN: CPT | Performed by: INTERNAL MEDICINE

## 2021-04-06 RX ORDER — ACETAZOLAMIDE 250 MG/1
1000 TABLET ORAL 2 TIMES DAILY
Qty: 40 TABLET | Refills: 0 | Status: SHIPPED | OUTPATIENT
Start: 2021-04-06 | End: 2021-04-19 | Stop reason: SDUPTHER

## 2021-04-06 RX ORDER — TOPIRAMATE 25 MG/1
25 TABLET ORAL DAILY
Qty: 30 TABLET | Refills: 1 | Status: SHIPPED | OUTPATIENT
Start: 2021-04-06 | End: 2021-06-17 | Stop reason: SDUPTHER

## 2021-04-06 RX ORDER — CYCLOBENZAPRINE HCL 5 MG
10 TABLET ORAL
Qty: 30 TABLET | Refills: 0 | Status: SHIPPED | OUTPATIENT
Start: 2021-04-06

## 2021-04-06 RX ORDER — FERROUS SULFATE TAB EC 324 MG (65 MG FE EQUIVALENT) 324 (65 FE) MG
324 TABLET DELAYED RESPONSE ORAL EVERY OTHER DAY
Qty: 30 TABLET | Refills: 3 | Status: SHIPPED | OUTPATIENT
Start: 2021-04-06 | End: 2021-06-17 | Stop reason: SDUPTHER

## 2021-04-06 RX ORDER — INDOMETHACIN 75 MG/1
75 CAPSULE, EXTENDED RELEASE ORAL
Qty: 30 CAPSULE | Refills: 1 | Status: SHIPPED | OUTPATIENT
Start: 2021-04-06 | End: 2021-06-17 | Stop reason: SDUPTHER

## 2021-04-06 RX ORDER — RIBOFLAVIN (VITAMIN B2) 400 MG
1 TABLET ORAL DAILY
Qty: 30 TABLET | Refills: 0 | Status: SHIPPED | OUTPATIENT
Start: 2021-04-06

## 2021-04-06 NOTE — LETTER
April 6, 2021     Patient: Ramakrishna Mendes   YOB: 1985   Date of Visit: 4/6/2021       To Whom it May Concern:    Shayy Archuleta is under my professional care  She was seen in my office on 4/6/2021  She may return to work on 4/7/2021  If you have any questions or concerns, please don't hesitate to call           Sincerely,          Janette Garcia DO        CC: No Recipients

## 2021-04-16 DIAGNOSIS — G93.2 IIH (IDIOPATHIC INTRACRANIAL HYPERTENSION): ICD-10-CM

## 2021-04-16 NOTE — TELEPHONE ENCOUNTER
Name of medication, dose, quantity and frequency  Requested Prescriptions     Pending Prescriptions Disp Refills    acetaZOLAMIDE (DIAMOX) 250 mg tablet 40 tablet 0     Sig: Take 4 tablets (1,000 mg total) by mouth 2 (two) times a day         Number of refills left: 0    Amount of medication left: 0    Pharmacy verified and updated    Additional information:    Patient is requesting a month supply due it's more convenient then have to call every 5 days to refill  Patient currently has no more refills  I did contact pharmacy to confirm & it's confirmed patient has no refill   Please review ASAP

## 2021-04-17 DIAGNOSIS — G93.2 IIH (IDIOPATHIC INTRACRANIAL HYPERTENSION): ICD-10-CM

## 2021-04-19 ENCOUNTER — NURSE TRIAGE (OUTPATIENT)
Dept: OTHER | Facility: OTHER | Age: 36
End: 2021-04-19

## 2021-04-19 DIAGNOSIS — G93.2 IIH (IDIOPATHIC INTRACRANIAL HYPERTENSION): ICD-10-CM

## 2021-04-19 RX ORDER — ACETAZOLAMIDE 250 MG/1
1000 TABLET ORAL 2 TIMES DAILY
Qty: 40 TABLET | Refills: 0 | OUTPATIENT
Start: 2021-04-19

## 2021-04-19 RX ORDER — ACETAZOLAMIDE 250 MG/1
1000 TABLET ORAL 2 TIMES DAILY
Qty: 56 TABLET | Refills: 0 | Status: SHIPPED | OUTPATIENT
Start: 2021-04-19 | End: 2021-04-28 | Stop reason: SDUPTHER

## 2021-04-19 NOTE — TELEPHONE ENCOUNTER
Reason for Disposition   [1] Caller has URGENT medication question about med that PCP or specialist prescribed AND [2] triager unable to answer question    Answer Assessment - Initial Assessment Questions  1  NAME of MEDICATION: "What medicine are you calling about?"      Diamox 250 mg PO 4 tablets by mouth bid  2  QUESTION: "What is your question?"      "I am all out"  3  PRESCRIBING HCP: "Who prescribed it?" Reason: if prescribed by specialist, call should be referred to that group        [facility]    Protocols used: MEDICATION QUESTION CALL-ADULT-

## 2021-04-19 NOTE — TELEPHONE ENCOUNTER
Regarding: Out of medication Acetazolamide (DIAMOX) A perscription was to call in to pharmacy but do not, need   ----- Message from Lisa Kevin sent at 4/19/2021  6:10 PM EDT -----  " I have been out of my medication Acetazolamide (DIAMOX) for a week now my doctor office was to call in for a refill and they did not cause when I call the pharmacy they told me that a prescription was not call in and I need this medication cause I am out "

## 2021-04-19 NOTE — TELEPHONE ENCOUNTER
Notified patient of medication to be called in and confirmed pharmacy  Patient verbalized understanding

## 2021-04-24 DIAGNOSIS — R73.03 PREDIABETES: ICD-10-CM

## 2021-04-27 ENCOUNTER — TELEPHONE (OUTPATIENT)
Dept: INTERNAL MEDICINE CLINIC | Facility: CLINIC | Age: 36
End: 2021-04-27

## 2021-04-27 DIAGNOSIS — G93.2 IIH (IDIOPATHIC INTRACRANIAL HYPERTENSION): ICD-10-CM

## 2021-04-27 RX ORDER — ACETAZOLAMIDE 250 MG/1
1000 TABLET ORAL 2 TIMES DAILY
Qty: 56 TABLET | Refills: 0 | Status: CANCELLED | OUTPATIENT
Start: 2021-04-27 | End: 2021-05-04

## 2021-04-27 NOTE — TELEPHONE ENCOUNTER
Patient states she has had issues in the past getting this medication sent over: acetaZOLAMIDE (DIAMOX) 250 mg tablet  She stresses the importance on getting this medication sent over  I requested this medication on a separate encounter  But patient wanted me to communicate the importance as she is on her last 4 pills  Please review

## 2021-04-27 NOTE — TELEPHONE ENCOUNTER
Name of medication, dose, quantity and frequency  Requested Prescriptions     Pending Prescriptions Disp Refills    acetaZOLAMIDE (DIAMOX) 250 mg tablet 56 tablet 0     Sig: Take 4 tablets (1,000 mg total) by mouth 2 (two) times a day for 7 days       Number of refills left:0    Amount of medication left:4    Pharmacy verified and updated:  Yes     Additional information:

## 2021-04-28 RX ORDER — ACETAZOLAMIDE 250 MG/1
1000 TABLET ORAL 2 TIMES DAILY
Qty: 240 TABLET | Refills: 1 | Status: SHIPPED | OUTPATIENT
Start: 2021-04-28 | End: 2021-06-17 | Stop reason: ALTCHOICE

## 2021-04-28 NOTE — TELEPHONE ENCOUNTER
This refill was placed by an after-hours nurse for an emergency 7-day supply  Patient has since finished these pills and no longer has any left  Please have covering doctor refill immediately  Again, patient takes 8 pills per day  (4 pills/ BID)

## 2021-04-28 NOTE — TELEPHONE ENCOUNTER
appt scheduled 06/17/2021 & patient already scheduled Neuro  Advised script sent to pharmacy she will follow up with Pharmacy   No questions at this time

## 2021-04-30 ENCOUNTER — TELEPHONE (OUTPATIENT)
Dept: OTHER | Facility: HOSPITAL | Age: 36
End: 2021-04-30

## 2021-04-30 RX ORDER — METFORMIN HYDROCHLORIDE 500 MG/1
TABLET, EXTENDED RELEASE ORAL
Qty: 30 TABLET | Refills: 1 | OUTPATIENT
Start: 2021-04-30

## 2021-04-30 NOTE — TELEPHONE ENCOUNTER
Metformin current on hold at this time as patient is being prescribed and taking Topamax  Patient aware of this  However, please call patient to let her know that she has outstanding blood work that must be completed (CMP and Mg) in order to access her electrolytes after increasing her dose of Acetazolamide  Additionally, ask if patient's headaches continue to persist and if she has been able to see an opthalmologist  If her headaches persist, I will send a message to Dr Familia Cleary office (with whom she is currently scheduled for 6/2021) to see if she can be seen sooner  Thank you

## 2021-05-03 NOTE — TELEPHONE ENCOUNTER
Patient returned phone call per Janes Sigala call pharmacy  Advised patient   No questions at this time

## 2021-05-04 NOTE — TELEPHONE ENCOUNTER
Spoke to patient and confirmed she is not taking the Metformin  She said she forgot about the labs but she will do them as soon as she can   She said headaches are a little better and she is working on getting into an eye doctor due to her insurance but she is working on that as well

## 2021-05-27 ENCOUNTER — HOSPITAL ENCOUNTER (EMERGENCY)
Facility: HOSPITAL | Age: 36
Discharge: HOME | End: 2021-05-27
Attending: EMERGENCY MEDICINE | Admitting: EMERGENCY MEDICINE
Payer: COMMERCIAL

## 2021-05-27 VITALS
OXYGEN SATURATION: 98 % | DIASTOLIC BLOOD PRESSURE: 75 MMHG | SYSTOLIC BLOOD PRESSURE: 141 MMHG | RESPIRATION RATE: 18 BRPM | HEART RATE: 81 BPM | TEMPERATURE: 97.9 F

## 2021-05-27 DIAGNOSIS — Z20.822 ENCOUNTER FOR SCREENING LABORATORY TESTING FOR COVID-19 VIRUS: Primary | ICD-10-CM

## 2021-05-27 LAB — SARS-COV-2 RNA RESP QL NAA+PROBE: POSITIVE

## 2021-05-27 PROCEDURE — U0003 INFECTIOUS AGENT DETECTION BY NUCLEIC ACID (DNA OR RNA); SEVERE ACUTE RESPIRATORY SYNDROME CORONAVIRUS 2 (SARS-COV-2) (CORONAVIRUS DISEASE [COVID-19]), AMPLIFIED PROBE TECHNIQUE, MAKING USE OF HIGH THROUGHPUT TECHNOLOGIES AS DESCRIBED BY CMS-2020-01-R: HCPCS | Performed by: EMERGENCY MEDICINE

## 2021-05-27 PROCEDURE — 99283 EMERGENCY DEPT VISIT LOW MDM: CPT

## 2021-05-27 PROCEDURE — U0005 INFEC AGEN DETEC AMPLI PROBE: HCPCS | Performed by: EMERGENCY MEDICINE

## 2021-05-27 PROCEDURE — 99282 EMERGENCY DEPT VISIT SF MDM: CPT | Performed by: EMERGENCY MEDICINE

## 2021-05-27 NOTE — DISCHARGE INSTRUCTIONS
We are testing you for COVID-19  Please isolate until your test results are available  We will call you with the results

## 2021-05-27 NOTE — ED PROVIDER NOTES
EMERGENCY DEPARTMENT ENCOUNTER NOTE    This note has been generated using a voice recognition software  There may be typographic, grammatic, or word substitution errors that have escaped editorial review  ? CHIEF COMPLAINT  Chief Complaint   Patient presents with    Biological Exposure     Exposure to coworker on Friday who tested positive  pt has had covid in past  not feeling symptoms       HPI  Eduarda Beaver is a 28 y o  female with PMH of asthma as well as Covid-19 illness on 2020 presenting for Covid-19 testing  One of patient's colleagues recently tested positive for COVID-19  Patient has been in contact with this person  She is presenting for further evaluation  Patient's daughter is having sore throat and wheezing and cough  Patient herself has no symptoms  She reports recovering after COVID-19 in December  REVIEW OF SYSTEMS    Constitutional: denies fevers, chills  Visual/Eyes: no changes in vision  HENT: no rhinorrhea, no sore throat  Cardiac: no chest pain  Respiratory: no shortness of breath, no cough  GI: no abdominal pain, nausea, vomiting, or diarrhea  : no burning with urination  Heme/Onc: no easy bruising  Endocrine: no diabetes  Neuro: no weakness or numbness, no headaches    Ten systems reviewed and negative unless otherwise noted in HPI and above    PAST MEDICAL HISTORY  Past Medical History:   Diagnosis Date    Asthma        SURGICAL HISTORY  Past Surgical History:   Procedure Laterality Date     SECTION         FAMILY HISTORY  Family History   Problem Relation Age of Onset    No Known Problems Other     Arthritis Mother     Fibromyalgia Mother     Diabetes Family     Cancer Family     Heart disease Family         CURRENT MEDICATIONS  No current facility-administered medications on file prior to encounter        Current Outpatient Medications on File Prior to Encounter   Medication Sig    acetaZOLAMIDE (DIAMOX) 250 mg tablet Take 4 tablets (1,000 mg total) by mouth 2 (two) times a day    Advair HFA 45-21 MCG/ACT inhaler INHALE 2 PUFFS 2 (TWO) TIMES A DAY RINSE MOUTH AFTER USE      albuterol (Proventil HFA) 90 mcg/act inhaler Inhale 2 puffs every 6 (six) hours as needed for wheezing    cyclobenzaprine (FLEXERIL) 5 mg tablet Take 2 tablets (10 mg total) by mouth daily at bedtime    ferrous sulfate 324 (65 Fe) mg Take 1 tablet (324 mg total) by mouth every other day    Riboflavin 400 MG TABS Take 1 tablet (400 mg total) by mouth daily    topiramate (TOPAMAX) 25 mg tablet Take 1 tablet (25 mg total) by mouth daily    albuterol (2 5 mg/3 mL) 0 083 % nebulizer solution TAKE 1 VIAL BY NEBULIZATION EVERY 6 HOURS AS NEEDED FOR WHEEZING OR SHORTNESS OF BREATH    indomethacin (INDOCIN SR) 75 mg CR capsule Take 1 capsule (75 mg total) by mouth daily with breakfast    metFORMIN (GLUCOPHAGE-XR) 500 mg 24 hr tablet TAKE 1 TABLET BY MOUTH EVERY DAY WITH DINNER (Patient not taking: Reported on 4/6/2021)       ALLERGIES  No Known Allergies    SOCIAL HISTORY  Social History     Socioeconomic History    Marital status: /Civil Union     Spouse name: None    Number of children: None    Years of education: None    Highest education level: None   Occupational History    None   Social Needs    Financial resource strain: None    Food insecurity     Worry: None     Inability: None    Transportation needs     Medical: None     Non-medical: None   Tobacco Use    Smoking status: Never Smoker    Smokeless tobacco: Never Used   Substance and Sexual Activity    Alcohol use: No    Drug use: No    Sexual activity: Yes   Lifestyle    Physical activity     Days per week: None     Minutes per session: None    Stress: None   Relationships    Social connections     Talks on phone: None     Gets together: None     Attends Congregational service: None     Active member of club or organization: None     Attends meetings of clubs or organizations: None     Relationship status: None    Intimate partner violence     Fear of current or ex partner: None     Emotionally abused: None     Physically abused: None     Forced sexual activity: None   Other Topics Concern    None   Social History Narrative    Social problem        PHYSICAL EXAM    /75   Pulse 81   Temp 97 9 °F (36 6 °C) (Oral)   Resp 18   LMP 05/13/2021   SpO2 98%   Breastfeeding No   Vital signs and nursing notes reviewed    Constitutional:  Awake, alert, oriented  No acute distress  HEENT:  Normocephalic, atraumatic  Sclera anicteric, conjunctiva not injected  Moist oral mucosa  Cardiac:  Appears well-perfused  Respiratory:  Breathing comfortably on room air  Abdomen:  Nondistended  Extremities:  No deformities, no edema  Integument:  No rashes over exposed areas, cap refill less than 2 seconds  Neurologic:  Awake, alert, and oriented x3  Nonfocal exam   Psychiatric:  Normal affect  ? LABS AND TESTS    Results Reviewed     Procedure Component Value Units Date/Time    Novel Coronavirus [facility] [[id number]] Collected: 05/27/21 1242    Lab Status: In process Specimen: Nares from Nose Updated: 05/27/21 1246          No orders to display       ED COURSE & MEDICAL DECISION MAKING  Procedures             Medications - No data to display     58-year-old female presenting with request for COVID-19 testing in setting of exposure to a colleague who ultimately tested positive for COVID-19  Patient's daughter is having some symptoms such as nasal congestion, shortness of breath, sore throat, and cough  Patient is asymptomatic  Vital signs reviewed, afebrile, mildly hypertensive, not hypoxic or tachycardic  COVID-19 swab sent  We will notify the patient of test results via phone  Recommend isolating until test result is available    Return to emergency department if symptoms, including but not limited to chest pain, shortness of breath, inability to keep anything down by mouth, near-syncope versus syncope, etc          MDM  Number of Diagnoses or Management Options  Encounter for screening laboratory testing for COVID-19 virus: new and requires workup     Amount and/or Complexity of Data Reviewed  Clinical lab tests: ordered    Risk of Complications, Morbidity, and/or Mortality  Presenting problems: minimal  Diagnostic procedures: low  Management options: minimal    Patient Progress  Patient progress: stable      CLINICAL IMPRESSION  Final diagnoses:   Encounter for screening laboratory testing for COVID-19 virus       DISPOSITION  Time reflects when diagnosis was documented in both MDM as applicable and the Disposition within this note     Time User Action Codes Description Comment    5/27/2021  1:27 PM Mesfin Gold [Z20 [id number]] Encounter for screening laboratory testing for COVID-19 virus       ED Disposition     ED Disposition Condition Date/Time Comment    Discharge Stable Thu May 27, 2021  1:27 PM [address] discharge to home/self care              Follow-up Information     Follow up With Specialties Details Why Contact Info Additional Information    Rodney Callejas DO Internal Medicine Call in 1 day Emergency Room Follow-up [address] [facility] 210 [facility]  [phone]       [address] [place] [address] Emergency Department Emergency Medicine Go to  As needed, If symptoms worsen [address]  [address] [facility] 64 [facility] Emergency Department, [address], [place], [address] [facility], [facility] 108             Kayla Johnson MD  05/31/21 0968

## 2021-05-27 NOTE — Clinical Note
Ramon Andre was seen and treated in our emergency department on 5/27/2021  Diagnosis:     Cindy  may return to work on return date  She may return on this date: 05/28/2021         If you have any questions or concerns, please don't hesitate to call        Candice Eisenberg MD    ______________________________           _______________          _______________  Hospital Representative                              Date                                Time

## 2021-06-17 ENCOUNTER — OFFICE VISIT (OUTPATIENT)
Dept: INTERNAL MEDICINE CLINIC | Facility: CLINIC | Age: 36
End: 2021-06-17

## 2021-06-17 VITALS
TEMPERATURE: 97.3 F | BODY MASS INDEX: 55.8 KG/M2 | OXYGEN SATURATION: 98 % | SYSTOLIC BLOOD PRESSURE: 143 MMHG | HEART RATE: 75 BPM | DIASTOLIC BLOOD PRESSURE: 90 MMHG | WEIGHT: 293 LBS

## 2021-06-17 DIAGNOSIS — G93.2 IIH (IDIOPATHIC INTRACRANIAL HYPERTENSION): ICD-10-CM

## 2021-06-17 DIAGNOSIS — R51.9 CHRONIC INTRACTABLE HEADACHE, UNSPECIFIED HEADACHE TYPE: Primary | ICD-10-CM

## 2021-06-17 DIAGNOSIS — G89.29 CHRONIC INTRACTABLE HEADACHE, UNSPECIFIED HEADACHE TYPE: Primary | ICD-10-CM

## 2021-06-17 PROCEDURE — 3080F DIAST BP >= 90 MM HG: CPT | Performed by: INTERNAL MEDICINE

## 2021-06-17 PROCEDURE — T1015 CLINIC SERVICE: HCPCS | Performed by: INTERNAL MEDICINE

## 2021-06-17 PROCEDURE — 3077F SYST BP >= 140 MM HG: CPT | Performed by: INTERNAL MEDICINE

## 2021-06-17 PROCEDURE — 99215 OFFICE O/P EST HI 40 MIN: CPT | Performed by: INTERNAL MEDICINE

## 2021-06-17 RX ORDER — INDOMETHACIN 75 MG/1
75 CAPSULE, EXTENDED RELEASE ORAL
Qty: 30 CAPSULE | Refills: 1 | Status: SHIPPED | OUTPATIENT
Start: 2021-06-17 | End: 2021-09-01

## 2021-06-17 RX ORDER — FUROSEMIDE 20 MG/1
20 TABLET ORAL DAILY
Qty: 30 TABLET | Refills: 2 | Status: SHIPPED | OUTPATIENT
Start: 2021-06-17 | End: 2021-09-29 | Stop reason: SDUPTHER

## 2021-06-17 RX ORDER — TOPIRAMATE 25 MG/1
25 TABLET ORAL DAILY
Qty: 30 TABLET | Refills: 1 | Status: SHIPPED | OUTPATIENT
Start: 2021-06-17 | End: 2021-09-29

## 2021-06-17 RX ORDER — FERROUS SULFATE TAB EC 324 MG (65 MG FE EQUIVALENT) 324 (65 FE) MG
324 TABLET DELAYED RESPONSE ORAL EVERY OTHER DAY
Qty: 30 TABLET | Refills: 3 | Status: SHIPPED | OUTPATIENT
Start: 2021-06-17 | End: 2021-09-29 | Stop reason: SDUPTHER

## 2021-06-17 RX ORDER — FUROSEMIDE 20 MG/1
20 TABLET ORAL 2 TIMES DAILY
Qty: 30 TABLET | Refills: 2 | Status: SHIPPED | OUTPATIENT
Start: 2021-06-17 | End: 2021-06-17

## 2021-06-17 NOTE — PROGRESS NOTES
ASSESSMENT/PLAN:  Diagnoses and all orders for this visit:    Ms Kevin Staton is a 28year old female with PMHx of chronic headaches thought to be 2/2 IIH, moderate persistent asthma, prediabetes (A1c: 5 9), and morbid obesity (BMI: 56 19):    Chronic Intractable Headache Likely 2/2 Idiopathic Intracranial Hypertension:  Patient continues to complain of continued but improved right occipital/frontal pain  This has been ongoing for a number of months and was previously associated with pulsatile orbital pain, lightheadedness/dizziness, and occasional denies/whooshing in her right ear  Pain occurring along side and 80 lb weight gain and MRI brain imaging demonstrating possible idiopathic intracranial hypertension  Following MRI imaging, patient was started on Acetazolamide, Indomethacin, and Topamax  Patient has noted improvement in her symptoms but has developed a widespread pruritic rash following Diamox administration  Patient continues to deny any visual disturbances and her physical exam remains intact with no focal neurologic deficits, evidence of papilledema on funduscopic examination, or disruption of visual fields  Patient has been unable to be seen and evaluated by Ophthalmology and Neurology and has not had LP to confirm the diagnosis of IIH  At this time, I will order at an LP and also transition the patient from Acetazolamide to Lasix to see if she could better tolerate this medication  Patient was advised to schedule an appointment and see an optometrist in the interim while she is waiting for an ophthalmology appointment  Patient is currently scheduled for an appointment with Neurology on 10/21/2021 after a previously scheduled appointment was canceled due to COVID-19  I personally would like the patient to be seen and evaluated by Neurology much sooner    I will personally reach out to Neurology Service to express my concerns and to if see the patient can be seen in office at an earlier date  Plan:  · Medications:  · Begin Lasix 20 mg daily; scripts sent to patient's pharmacy  · Discontinue Acetazolamide 1000 mg BID  · Continue Indomethacin 75 mg daily; refill sent to patient's pharmacy  · Continue Topamax 25 mg daily; refill sent to patient's pharmacy  · Continue Ferrous sulfate 325 mg every other day; refill sent to patient's pharmacy  · Lab/studies:  · Repeat CMP and Mg previously ordered and pending  · Lumbar puncture ordered  · Referrals:  · Ambulatory referral to Ophthalmology previously placed  · Ambulatory referral to Neurology previously placed; appointment scheduled for 10/21/2021  · Patient advised to report to the emergency department immediately if she begins to experience any visual changes  · Advise continue lifestyle changes including diet and exercise    Health Maintenance:   Labs:  o Hemoglobin A1c: 5 9 (2017) Repeat ordered  o HIV: Negative (2020)  o Hepatitis C: Negative (2020)  o Lipid panel: Ordered   Cancer Screening:  o Cervical: PAP negative (2018)   Vaccinations:  o COVID: Refused  o PNA: UTD  o Tdap: UTD   Other:  o PHQ9: UTD  o BMI: UTD    Schedule a follow-up appointment in 2 months    CHIEF COMPLAINT: Follow up of chronic headaches    HISTORY OF PRESENT ILLNESS:  Ms Emiliano Diggs is a 28year old female with PMHx of chronic headaches thought to be 2/2 IIH, moderate persistent asthma, prediabetes (A1c: 5 9), and morbid obesity (BMI: 56 19) who presents to the office today, 6/17/2021, for follow up of her chronic headaches  During today's appointment, patient notes improvement in her headaches  She states that she continues to have headaches a couple times each week but no longer daily  She does not take her prescribed medications at the same time daily and notices that her headaches occur when she forgets to take her medication or takes it later in the day  Headaches remain situated in her right occipital/right frontal region    She states that her right ear whooshing/tinnitus is largely resolved but does occur occasionally  She continues to deny any sort of visual changes including blurred vision or diplopia  She also denies any orbital pain or pulsations  However, the patient has noted that she has developed a diffuse rash that is pruritic  She has noticed a rash typically occurs following administration of Diamox, and states that she is unable to tolerate the pruritis  Still today, patient has not been seen and evaluated by ophthalmology or neurology  She states that she had an appointment scheduled for Neurology 06/3/2021 but this was canceled as she tested positive for COVID on 05/27/2021  She notes that she has had difficulties being seen by Ophthalmology secondary to insurance reasons  Transportation is also an issue as patient does not have a car and relies on uber  The patient is well aware of the importance of these referrals and demonstrates understanding that she must immediately report to the emergency department if she develops any visual changes  The following portions of the patient's history were reviewed and updated as appropriate: allergies, current medications, past family history, past medical history, past social history, past surgical history and problem list     Review of Systems   Constitutional: Positive for unexpected weight change  Negative for chills, fatigue and fever  HENT: Positive for tinnitus  Negative for ear pain, facial swelling, rhinorrhea, sinus pressure and sinus pain  Eyes: Negative for photophobia, pain, discharge, redness and visual disturbance  Respiratory: Negative for cough and shortness of breath  Cardiovascular: Negative for chest pain, palpitations and leg swelling  Gastrointestinal: Negative for constipation, diarrhea, nausea and vomiting  Endocrine: Positive for polyuria  Negative for polydipsia and polyphagia  Genitourinary: Negative for frequency and menstrual problem  Musculoskeletal: Negative for back pain  Skin: Negative for color change and pallor  Neurological: Positive for headaches  Negative for dizziness, weakness and light-headedness  Psychiatric/Behavioral: Negative for agitation and confusion  OBJECTIVE:  There were no vitals filed for this visit  Physical Exam  Constitutional:       General: She is not in acute distress  Appearance: Normal appearance  She is obese  She is not ill-appearing  HENT:      Head: Normocephalic and atraumatic  Nose: Nose normal  No congestion  Mouth/Throat:      Mouth: Mucous membranes are moist       Pharynx: Oropharynx is clear  No oropharyngeal exudate  Eyes:      General: Lids are normal  Vision grossly intact  Gaze aligned appropriately  No visual field deficit or scleral icterus  Right eye: No discharge  Left eye: No discharge  Extraocular Movements: Extraocular movements intact  Conjunctiva/sclera: Conjunctivae normal       Pupils: Pupils are equal, round, and reactive to light  Comments: No papillaedema   Cardiovascular:      Rate and Rhythm: Normal rate and regular rhythm  Pulses: Normal pulses  Heart sounds: Normal heart sounds  No murmur heard  Pulmonary:      Effort: Pulmonary effort is normal       Breath sounds: Normal breath sounds  No wheezing  Abdominal:      General: Abdomen is flat  Bowel sounds are normal       Palpations: Abdomen is soft  Tenderness: There is no abdominal tenderness  Musculoskeletal:         General: Normal range of motion  Cervical back: Normal range of motion  No rigidity  Right lower leg: No edema  Left lower leg: No edema  Lymphadenopathy:      Cervical: No cervical adenopathy  Skin:     General: Skin is warm and dry  Capillary Refill: Capillary refill takes less than 2 seconds  Neurological:      General: No focal deficit present        Mental Status: She is alert and oriented to person, place, and time  Mental status is at baseline  Cranial Nerves: No cranial nerve deficit  Motor: No weakness     Psychiatric:         Mood and Affect: Mood normal          Behavior: Behavior normal            Current Outpatient Medications:     acetaZOLAMIDE (DIAMOX) 250 mg tablet, Take 4 tablets (1,000 mg total) by mouth 2 (two) times a day, Disp: 240 tablet, Rfl: 1    Advair HFA 45-21 MCG/ACT inhaler, INHALE 2 PUFFS 2 (TWO) TIMES A DAY RINSE MOUTH AFTER USE , Disp: 24 Inhaler, Rfl: 3    albuterol (2 5 mg/3 mL) 0 083 % nebulizer solution, TAKE 1 VIAL BY NEBULIZATION EVERY 6 HOURS AS NEEDED FOR WHEEZING OR SHORTNESS OF BREATH, Disp: 75 mL, Rfl: 1    albuterol (Proventil HFA) 90 mcg/act inhaler, Inhale 2 puffs every 6 (six) hours as needed for wheezing, Disp: 6 7 g, Rfl: 3    cyclobenzaprine (FLEXERIL) 5 mg tablet, Take 2 tablets (10 mg total) by mouth daily at bedtime, Disp: 30 tablet, Rfl: 0    ferrous sulfate 324 (65 Fe) mg, Take 1 tablet (324 mg total) by mouth every other day, Disp: 30 tablet, Rfl: 3    indomethacin (INDOCIN SR) 75 mg CR capsule, Take 1 capsule (75 mg total) by mouth daily with breakfast, Disp: 30 capsule, Rfl: 1    metFORMIN (GLUCOPHAGE-XR) 500 mg 24 hr tablet, TAKE 1 TABLET BY MOUTH EVERY DAY WITH DINNER (Patient not taking: Reported on 2021), Disp: 30 tablet, Rfl: 1    Riboflavin 400 MG TABS, Take 1 tablet (400 mg total) by mouth daily, Disp: 30 tablet, Rfl: 0    topiramate (TOPAMAX) 25 mg tablet, Take 1 tablet (25 mg total) by mouth daily, Disp: 30 tablet, Rfl: 1    Past Medical History:   Diagnosis Date    Asthma      Past Surgical History:   Procedure Laterality Date     SECTION       Social History     Socioeconomic History    Marital status: /Civil Union     Spouse name: Not on file    Number of children: Not on file    Years of education: Not on file    Highest education level: Not on file   Occupational History    Not on file   Tobacco Use  Smoking status: Never Smoker    Smokeless tobacco: Never Used   Vaping Use    Vaping Use: Never used   Substance and Sexual Activity    Alcohol use: No    Drug use: No    Sexual activity: Yes   Other Topics Concern    Not on file   Social History Narrative    Social problem      Social Determinants of Health     Financial Resource Strain:     Difficulty of Paying Living Expenses:    Food Insecurity:     Worried About Running Out of Food in the Last Year:     [address] in the Last Year:    Transportation Needs:     Lack of Transportation (Medical):  Lack of Transportation (Non-Medical):    Physical Activity:     Days of Exercise per Week:     Minutes of Exercise per Session:    Stress:     Feeling of Stress :    Social Connections:     Frequency of Communication with Friends and Family:     Frequency of Social Gatherings with Friends and Family:     Attends Confucianist Services:     Active Member of Clubs or Organizations:     Attends Club or Organization Meetings:     Marital Status:    Intimate Partner Violence:     Fear of Current or Ex-Partner:     Emotionally Abused:     Physically Abused:     Sexually Abused:      Family History   Problem Relation Age of Onset    No Known Problems Other     Arthritis Mother     Fibromyalgia Mother     Diabetes Family     Cancer Family     Heart disease Family        ==  DO Taryn Izquierdo 73 Internal Medicine PGY-1    [facility]   [address] [facility]  Office: [phone]  Fax: [phone]

## 2021-06-25 ENCOUNTER — TELEPHONE (OUTPATIENT)
Dept: NEUROLOGY | Facility: CLINIC | Age: 36
End: 2021-06-25

## 2021-06-25 NOTE — TELEPHONE ENCOUNTER
----- Message from Albert Fermin MD sent at 6/25/2021  9:52 AM EDT -----  Regarding: FW: Amanda Bartholomew - Chronic Intractable Headaches with concern for IIH  Appt  asap   ----- Message -----  From: Jessica Joiner DO  Sent: 6/17/2021   6:10 PM EDT  To: Albert Fermin MD  Subject: Franco Mcneill Intractable Headaches wi#    Hello Dr Wilmer Dao  I hope all is well  I am writing to you in regards to my patient, Aidan Villasenor, who established care with me back in 8/2020  She originally presented to me with chronic intractable headaches primarily in the right occipital/frontal area with associated right ear "whoosing" and pulsatile right orbital pain  This symptoms have occurred alongside a rapid 80 lbs weight gain  Given concern for possible IIH, I had ordered a MRI brain 3/10/2021 which did show "prominent CSF within the optic nerve she is identified with partially of the sella turicia - IIH should be considered " The patient was then started on Acetazolamide, Topamax, and indomethacin with mild improvement symptoms  She has not had an LP to confirm the diagnosis, however this was just ordered by myself during today's visit  She has attempted to make an appointment with Ophthalmology as well as Neurology but unfortunately has not yet been seen  She was previously scheduled for an appointment with yourself on 06/03/2021; however, this appointment was canceled as she did test positive for COVID-19 day prior  Patient was seen and examined in the residency clinic by myself today  On physical exam, she has no focal neurologic deficits, no evidence of papilledema, and intact visual acuity  I have transitioned her from acetazolamide to furosemide as she has developed a widespread rash  Currently, she is scheduled for an appointment with you in October  Unfortunately, this was the earliest available appointment    Given the chronicity of her symptoms and the fact that they have continued to affect her daily activities, I wanted to write to you to see if there is any possibility she could be seen in your office sooner  Any help or recommendations that you would be able to provide at this time would be so greatly appreciated  Thank you so much enjoyed the remainder of your day

## 2021-06-25 NOTE — TELEPHONE ENCOUNTER
Spoke with Dr Ian Spencer via in person regarding urgent appt with headache team      Tg Araujo [phone] patient with no answer  Left message on voicemail box for call back  Called [phone], there was no answer and no option to leave a voicemail

## 2021-07-12 ENCOUNTER — HOSPITAL ENCOUNTER (EMERGENCY)
Facility: HOSPITAL | Age: 36
Discharge: HOME | DRG: 243 | End: 2021-07-13
Attending: EMERGENCY MEDICINE | Admitting: EMERGENCY MEDICINE
Payer: COMMERCIAL

## 2021-07-12 DIAGNOSIS — E04.1 THYROID NODULE: ICD-10-CM

## 2021-07-12 DIAGNOSIS — R60.0 LOWER EXTREMITY EDEMA: ICD-10-CM

## 2021-07-12 DIAGNOSIS — R10.9 RIGHT FLANK PAIN: Primary | ICD-10-CM

## 2021-07-12 LAB
BASOPHILS # BLD AUTO: 0.04 THOUSANDS/ΜL (ref 0–0.1)
BASOPHILS NFR BLD AUTO: 0 % (ref 0–1)
EOSINOPHIL # BLD AUTO: 0.55 THOUSAND/ΜL (ref 0–0.61)
EOSINOPHIL NFR BLD AUTO: 5 % (ref 0–6)
ERYTHROCYTE [DISTWIDTH] IN BLOOD BY AUTOMATED COUNT: 16.7 % (ref 11.6–15.1)
HCT VFR BLD AUTO: 39.9 % (ref 34.8–46.1)
HGB BLD-MCNC: 12.1 G/DL (ref 11.5–15.4)
IMM GRANULOCYTES # BLD AUTO: 0.03 THOUSAND/UL (ref 0–0.2)
IMM GRANULOCYTES NFR BLD AUTO: 0 % (ref 0–2)
LYMPHOCYTES # BLD AUTO: 2.93 THOUSANDS/ΜL (ref 0.6–4.47)
LYMPHOCYTES NFR BLD AUTO: 28 % (ref 14–44)
MCH RBC QN AUTO: 24.5 PG (ref 26.8–34.3)
MCHC RBC AUTO-ENTMCNC: 30.3 G/DL (ref 31.4–37.4)
MCV RBC AUTO: 81 FL (ref 82–98)
MONOCYTES # BLD AUTO: 0.67 THOUSAND/ΜL (ref 0.17–1.22)
MONOCYTES NFR BLD AUTO: 6 % (ref 4–12)
NEUTROPHILS # BLD AUTO: 6.2 THOUSANDS/ΜL (ref 1.85–7.62)
NEUTS SEG NFR BLD AUTO: 61 % (ref 43–75)
NRBC BLD AUTO-RTO: 0 /100 WBCS
PLATELET # BLD AUTO: 350 THOUSANDS/UL (ref 149–390)
PMV BLD AUTO: 10 FL (ref 8.9–12.7)
RBC # BLD AUTO: 4.94 MILLION/UL (ref 3.81–5.12)
WBC # BLD AUTO: 10.42 THOUSAND/UL (ref 4.31–10.16)

## 2021-07-12 PROCEDURE — 85730 THROMBOPLASTIN TIME PARTIAL: CPT | Performed by: EMERGENCY MEDICINE

## 2021-07-12 PROCEDURE — 85610 PROTHROMBIN TIME: CPT | Performed by: EMERGENCY MEDICINE

## 2021-07-12 PROCEDURE — 99285 EMERGENCY DEPT VISIT HI MDM: CPT | Performed by: EMERGENCY MEDICINE

## 2021-07-12 PROCEDURE — 80053 COMPREHEN METABOLIC PANEL: CPT | Performed by: EMERGENCY MEDICINE

## 2021-07-12 PROCEDURE — 96374 THER/PROPH/DIAG INJ IV PUSH: CPT

## 2021-07-12 PROCEDURE — 85025 COMPLETE CBC W/AUTO DIFF WBC: CPT | Performed by: EMERGENCY MEDICINE

## 2021-07-12 PROCEDURE — 93005 ELECTROCARDIOGRAM TRACING: CPT

## 2021-07-12 PROCEDURE — 99284 EMERGENCY DEPT VISIT MOD MDM: CPT

## 2021-07-12 PROCEDURE — 36415 COLL VENOUS BLD VENIPUNCTURE: CPT | Performed by: EMERGENCY MEDICINE

## 2021-07-12 PROCEDURE — 81025 URINE PREGNANCY TEST: CPT | Performed by: EMERGENCY MEDICINE

## 2021-07-12 PROCEDURE — 85379 FIBRIN DEGRADATION QUANT: CPT | Performed by: EMERGENCY MEDICINE

## 2021-07-12 RX ORDER — KETOROLAC TROMETHAMINE 30 MG/ML
15 INJECTION, SOLUTION INTRAMUSCULAR; INTRAVENOUS ONCE
Status: COMPLETED | OUTPATIENT
Start: 2021-07-12 | End: 2021-07-12

## 2021-07-12 RX ORDER — LIDOCAINE 50 MG/G
1 PATCH TOPICAL ONCE
Status: DISCONTINUED | OUTPATIENT
Start: 2021-07-12 | End: 2021-07-13 | Stop reason: HOSPADM

## 2021-07-12 RX ORDER — ACETAMINOPHEN 325 MG/1
975 TABLET ORAL ONCE
Status: COMPLETED | OUTPATIENT
Start: 2021-07-12 | End: 2021-07-12

## 2021-07-12 RX ADMIN — LIDOCAINE 1 PATCH: 50 PATCH TOPICAL at 23:27

## 2021-07-12 RX ADMIN — ACETAMINOPHEN 975 MG: 325 TABLET, FILM COATED ORAL at 23:21

## 2021-07-12 RX ADMIN — KETOROLAC TROMETHAMINE 15 MG: 30 INJECTION, SOLUTION INTRAMUSCULAR; INTRAVENOUS at 23:25

## 2021-07-12 NOTE — Clinical Note
Khoi Anderson was seen and treated in our emergency department on 7/12/2021  Diagnosis: Flank pain, lower extremity edema    Cindy  may return to work on return date  She may return on this date: 07/15/2021         If you have any questions or concerns, please don't hesitate to call        Jluis Pittman MD    ______________________________           _______________          _______________  Hospital Representative                              Date                                Time

## 2021-07-13 ENCOUNTER — APPOINTMENT (EMERGENCY)
Dept: RADIOLOGY | Facility: HOSPITAL | Age: 36
DRG: 243 | End: 2021-07-13
Payer: COMMERCIAL

## 2021-07-13 VITALS
TEMPERATURE: 97.9 F | OXYGEN SATURATION: 96 % | RESPIRATION RATE: 16 BRPM | DIASTOLIC BLOOD PRESSURE: 63 MMHG | SYSTOLIC BLOOD PRESSURE: 119 MMHG | HEART RATE: 81 BPM

## 2021-07-13 LAB
ALBUMIN SERPL BCP-MCNC: 3.1 G/DL (ref 3.5–5)
ALP SERPL-CCNC: 96 U/L (ref 46–116)
ALT SERPL W P-5'-P-CCNC: 24 U/L (ref 12–78)
ANION GAP SERPL CALCULATED.3IONS-SCNC: 7 MMOL/L (ref 4–13)
APTT PPP: 30 SECONDS (ref 23–37)
AST SERPL W P-5'-P-CCNC: 12 U/L (ref 5–45)
BILIRUB SERPL-MCNC: 0.22 MG/DL (ref 0.2–1)
BILIRUB UR QL STRIP: ABNORMAL
BUN SERPL-MCNC: 11 MG/DL (ref 5–25)
CALCIUM ALBUM COR SERPL-MCNC: 9.9 MG/DL (ref 8.3–10.1)
CALCIUM SERPL-MCNC: 9.2 MG/DL (ref 8.3–10.1)
CHLORIDE SERPL-SCNC: 106 MMOL/L (ref 100–108)
CLARITY UR: CLEAR
CLARITY, POC: CLEAR
CO2 SERPL-SCNC: 26 MMOL/L (ref 21–32)
COLOR UR: YELLOW
COLOR, POC: YELLOW
CREAT SERPL-MCNC: 0.82 MG/DL (ref 0.6–1.3)
D DIMER PPP FEU-MCNC: 0.78 UG/ML FEU
EXT PREG TEST URINE: NEGATIVE
EXT. CONTROL ED NAV: NORMAL
GFR SERPL CREATININE-BSD FRML MDRD: 93 ML/MIN/1.73SQ M
GLUCOSE SERPL-MCNC: 121 MG/DL (ref 65–140)
GLUCOSE UR STRIP-MCNC: NEGATIVE MG/DL
HGB UR QL STRIP.AUTO: NEGATIVE
INR PPP: 0.97 (ref 0.84–1.19)
KETONES UR STRIP-MCNC: NEGATIVE MG/DL
LEUKOCYTE ESTERASE UR QL STRIP: NEGATIVE
NITRITE UR QL STRIP: NEGATIVE
PH UR STRIP.AUTO: 5.5 [PH] (ref 4.5–8)
POTASSIUM SERPL-SCNC: 3.4 MMOL/L (ref 3.5–5.3)
PROT SERPL-MCNC: 8 G/DL (ref 6.4–8.2)
PROT UR STRIP-MCNC: NEGATIVE MG/DL
PROTHROMBIN TIME: 12.9 SECONDS (ref 11.6–14.5)
SODIUM SERPL-SCNC: 139 MMOL/L (ref 136–145)
SP GR UR STRIP.AUTO: >=1.03 (ref 1–1.03)
UROBILINOGEN UR QL STRIP.AUTO: 0.2 E.U./DL

## 2021-07-13 PROCEDURE — 71275 CT ANGIOGRAPHY CHEST: CPT

## 2021-07-13 PROCEDURE — 81003 URINALYSIS AUTO W/O SCOPE: CPT

## 2021-07-13 PROCEDURE — 96372 THER/PROPH/DIAG INJ SC/IM: CPT

## 2021-07-13 RX ADMIN — ENOXAPARIN SODIUM 135 MG: 150 INJECTION SUBCUTANEOUS at 03:08

## 2021-07-13 RX ADMIN — IOHEXOL 100 ML: 350 INJECTION, SOLUTION INTRAVENOUS at 00:53

## 2021-07-13 NOTE — DISCHARGE INSTRUCTIONS
Please follow-up with your primary care physician  Get the lower extremity ultrasound performed as discussed  Please return to the emergency department if you develop worsening symptoms, difficulty breathing, worsening edema, or anything else concerning to you

## 2021-07-13 NOTE — ED PROVIDER NOTES
History  Chief Complaint   Patient presents with    Flank Pain     pt c/o right sided flank pain for the past week  80-year-old female with history of asthma presents for evaluation of right flank pain  Patient states that she has had this pain for the past 3 years since getting COVID-19  The pain has significantly worsened over the past 2 weeks  Her pain is pleuritic in nature and is not worsened with range of motion of her back  She has been trying Tylenol without relief  She at times feels short of breath with the pain  Her asthma inhalers have not been improving her symptoms  She traveled to [facility] approximately 1 week ago after which she noted that her bilateral lower extremities became significantly swollen  Her right lower extremity was noticeably more swollen than the left  She also noted calf pain in her bilateral lower extremities, right greater than left  She denies fever, cough, chest pain, abdominal pain, nausea/vomiting, diarrhea  Prior to Admission Medications   Prescriptions Last Dose Informant Patient Reported? Taking? Advair HFA 45-21 MCG/ACT inhaler   No No   Sig: INHALE 2 PUFFS 2 (TWO) TIMES A DAY RINSE MOUTH AFTER USE     Riboflavin 400 MG TABS   No No   Sig: Take 1 tablet (400 mg total) by mouth daily   albuterol (2 5 mg/3 mL) 0 083 % nebulizer solution   No No   Sig: TAKE 1 VIAL BY NEBULIZATION EVERY 6 HOURS AS NEEDED FOR WHEEZING OR SHORTNESS OF BREATH   albuterol (Proventil HFA) 90 mcg/act inhaler   No No   Sig: Inhale 2 puffs every 6 (six) hours as needed for wheezing   cyclobenzaprine (FLEXERIL) 5 mg tablet   No No   Sig: Take 2 tablets (10 mg total) by mouth daily at bedtime   ferrous sulfate 324 (65 Fe) mg   No No   Sig: Take 1 tablet (324 mg total) by mouth every other day   furosemide (LASIX) 20 mg tablet   No No   Sig: Take 1 tablet (20 mg total) by mouth daily   indomethacin (INDOCIN SR) 75 mg CR capsule   No No   Sig: Take 1 capsule (75 mg total) by mouth daily with breakfast   metFORMIN (GLUCOPHAGE-XR) 500 mg 24 hr tablet   No No   Sig: TAKE 1 TABLET BY MOUTH EVERY DAY WITH DINNER   Patient not taking: Reported on 2021   topiramate (TOPAMAX) 25 mg tablet   No No   Sig: Take 1 tablet (25 mg total) by mouth daily      Facility-Administered Medications: None       Past Medical History:   Diagnosis Date    Asthma        Past Surgical History:   Procedure Laterality Date     SECTION         Family History   Problem Relation Age of Onset    No Known Problems Other     Arthritis Mother     Fibromyalgia Mother     Diabetes Family     Cancer Family     Heart disease Family      I have reviewed and agree with the history as documented  E-Cigarette/Vaping    E-Cigarette Use Never User      E-Cigarette/Vaping Substances    Nicotine No     THC No     CBD No     Flavoring No     Other No     Unknown No      Social History     Tobacco Use    Smoking status: Never Smoker    Smokeless tobacco: Never Used   Vaping Use    Vaping Use: Never used   Substance Use Topics    Alcohol use: No    Drug use: No        Review of Systems   Constitutional: Negative for chills and fever  Respiratory: Positive for shortness of breath  Negative for cough and chest tightness  Cardiovascular: Positive for leg swelling  Negative for chest pain  Gastrointestinal: Negative for abdominal distention, abdominal pain, diarrhea, nausea and vomiting  Genitourinary: Positive for flank pain  Negative for dysuria, frequency and urgency  Musculoskeletal: Negative for back pain and gait problem  Skin: Negative for pallor and rash  Neurological: Negative for syncope, weakness, light-headedness and headaches  All other systems reviewed and are negative        Physical Exam  ED Triage Vitals   Temperature Pulse Respirations Blood Pressure SpO2   21   97 9 °F (36 6 °C) 78 16 (!) 156/109 97 %      Temp Source Heart Rate Source Patient Position - Orthostatic VS BP Location FiO2 (%)   07/12/21 2156 07/12/21 2156 07/13/21 0111 07/12/21 2156 --   Oral Monitor Sitting Left arm       Pain Score       07/12/21 2156       7             Orthostatic Vital Signs  Vitals:    07/12/21 2156 07/13/21 0111   BP: (!) 156/109 119/63   Pulse: 78 81   Patient Position - Orthostatic VS:  Sitting       Physical Exam  Vitals and nursing note reviewed  Constitutional:       General: She is not in acute distress  Appearance: She is well-developed  HENT:      Head: Normocephalic and atraumatic  Eyes:      Pupils: Pupils are equal, round, and reactive to light  Cardiovascular:      Rate and Rhythm: Normal rate and regular rhythm  Heart sounds: No murmur heard  No friction rub  No gallop  Pulmonary:      Effort: Pulmonary effort is normal       Breath sounds: Normal breath sounds  No wheezing, rhonchi or rales  Abdominal:      General: Bowel sounds are normal  There is no distension  Palpations: Abdomen is soft  Tenderness: There is no abdominal tenderness  There is no right CVA tenderness or left CVA tenderness  Musculoskeletal:         General: No swelling or tenderness  Normal range of motion  Cervical back: Normal range of motion and neck supple  Skin:     General: Skin is warm and dry  Coloration: Skin is not pale  Findings: No rash  Neurological:      General: No focal deficit present  Mental Status: She is alert and oriented to person, place, and time     Psychiatric:         Behavior: Behavior normal          ED Medications  Medications   ketorolac (TORADOL) injection 15 mg (15 mg Intravenous Given 7/12/21 2325)   acetaminophen (TYLENOL) tablet 975 mg (975 mg Oral Given 7/12/21 2321)   iohexol (OMNIPAQUE) 350 MG/ML injection (SINGLE-DOSE) 100 mL (100 mL Intravenous Given 7/13/21 0053)   enoxaparin (LOVENOX) subcutaneous injection 135 mg (135 mg Subcutaneous Given 7/13/21 2073)       Diagnostic Studies  Results Reviewed     Procedure Component Value Units Date/Time    POCT pregnancy, urine [[id number]]  (Normal) Resulted: 07/13/21 0038    Lab Status: Final result Updated: 07/13/21 0038     EXT PREG TEST UR (Ref: Negative) Negative     Control Valid    POCT urinalysis dipstick [[id number]]  (Normal) Resulted: 07/13/21 0038    Lab Status: Final result Specimen: Urine Updated: 07/13/21 0038     Color, UA Yellow     Clarity, UA Clear    Urine Macroscopic, POC [[id number]]  (Abnormal) Collected: 07/13/21 0036    Lab Status: Final result Specimen: Urine Updated: 07/13/21 0037     Color, UA Yellow     Clarity, UA Clear     pH, UA 5 5     Leukocytes, UA Negative     Nitrite, UA Negative     Protein, UA Negative mg/dl      Glucose, UA Negative mg/dl      Ketones, UA Negative mg/dl      Urobilinogen, UA 0 2 E U /dl      Bilirubin, UA Interference- unable to analyze     Blood, UA Negative     Specific Gravity, UA >=1 030    Narrative:      CLINITEK RESULT    D-Dimer [[id number]]  (Abnormal) Collected: 07/12/21 2327    Lab Status: Final result Specimen: Blood from Arm, Left Updated: 07/13/21 0023     D-Dimer, Quant 0 78 ug/ml FEU     Comprehensive metabolic panel [[id number]]  (Abnormal) Collected: 07/12/21 2327    Lab Status: Final result Specimen: Blood from Arm, Left Updated: 07/13/21 0013     Sodium 139 mmol/L      Potassium 3 4 mmol/L      Chloride 106 mmol/L      CO2 26 mmol/L      ANION GAP 7 mmol/L      BUN 11 mg/dL      Creatinine 0 82 mg/dL      Glucose 121 mg/dL      Calcium 9 2 mg/dL      Corrected Calcium 9 9 mg/dL      AST 12 U/L      ALT 24 U/L      Alkaline Phosphatase 96 U/L      Total Protein 8 0 g/dL      Albumin 3 1 g/dL      Total Bilirubin 0 22 mg/dL      eGFR 93 ml/min/1 73sq m     Narrative:      [place] guidelines for Chronic Kidney Disease (CKD):     Stage 1 with normal or high GFR (GFR > 90 mL/min/1 73 square meters)    Stage 2 Mild CKD (GFR = 60-89 mL/min/1 73 square meters)    Stage 3A Moderate CKD (GFR = 45-59 mL/min/1 73 square meters)    Stage 3B Moderate CKD (GFR = 30-44 mL/min/1 73 square meters)    Stage 4 Severe CKD (GFR = 15-29 mL/min/1 73 square meters)    Stage 5 End Stage CKD (GFR <15 mL/min/1 73 square meters)  Note: GFR calculation is accurate only with a steady state creatinine    Protime-INR [[id number]]  (Normal) Collected: 07/12/21 2327    Lab Status: Final result Specimen: Blood from Arm, Left Updated: 07/13/21 0012     Protime 12 9 seconds      INR 0 97    APTT [[id number]]  (Normal) Collected: 07/12/21 2327    Lab Status: Final result Specimen: Blood from Arm, Left Updated: 07/13/21 0012     PTT 30 seconds     CBC and differential [[id number]]  (Abnormal) Collected: 07/12/21 2327    Lab Status: Final result Specimen: Blood from Arm, Left Updated: 07/12/21 2344     WBC 10 42 Thousand/uL      RBC 4 94 Million/uL      Hemoglobin 12 1 g/dL      Hematocrit 39 9 %      MCV 81 fL      MCH 24 5 pg      MCHC 30 3 g/dL      RDW 16 7 %      MPV 10 0 fL      Platelets 601 Thousands/uL      nRBC 0 /100 WBCs      Neutrophils Relative 61 %      Immat GRANS % 0 %      Lymphocytes Relative 28 %      Monocytes Relative 6 %      Eosinophils Relative 5 %      Basophils Relative 0 %      Neutrophils Absolute 6 20 Thousands/µL      Immature Grans Absolute 0 03 Thousand/uL      Lymphocytes Absolute 2 93 Thousands/µL      Monocytes Absolute 0 67 Thousand/µL      Eosinophils Absolute 0 55 Thousand/µL      Basophils Absolute 0 04 Thousands/µL                  CTA ED chest PE study   Final Result by Jessica Suero MD (07/13 0225)      Limited exam secondary to suboptimal opacification of the pulmonary arterial tree  No evidence of central obstructing pulmonary embolus  No consolidation or effusion  Incidental thyroid nodule(s) for which nonemergent thyroid ultrasound is recommended        Workstation performed: [id number]         VAS lower limb venous duplex study, complete bilateral    (Results Pending)         Procedures  Procedures      ED Course  ED Course as of Jul 13 1557   Tue Jul 13, 2021   0023 Comprehensive metabolic panel(!)   3502 Protime-INR   0024 D-Dimer, Quant(!): 0 78   0045 PREGNANCY TEST URINE: Negative   0045 Urine Macroscopic, POC(!)                             SBIRT 22yo+      Most Recent Value   SBIRT (23 yo +)   In order to provide better care to our patients, we are screening all of our patients for alcohol and drug use  Would it be okay to ask you these screening questions? Unable to answer at this time Filed at: 07/13/2021 0057          Wells' Criteria for PE      Most Recent Value   Wells' Criteria for PE   Clinical signs and symptoms of DVT  3 Filed at: 07/12/2021 2329   PE is primary diagnosis or equally likely  0 Filed at: 07/12/2021 2329   HR >100  0 Filed at: 07/12/2021 2329   Immobilization at least 3 days or Surgery in the previous 4 weeks  0 Filed at: 07/12/2021 2329   Previous, objectively diagnosed PE or DVT  0 Filed at: 07/12/2021 2329   Hemoptysis  0 Filed at: 07/12/2021 2329   Malignancy with treatment within 6 months or palliative  0 Filed at: 07/12/2021 2329   Wells' Criteria Total  3 Filed at: 07/12/2021 2329            MDM  Number of Diagnoses or Management Options  Lower extremity edema: new and requires workup  Right flank pain: new and requires workup  Thyroid nodule: minor  Diagnosis management comments: 44-year-old female who presents for evaluation of right-sided flank pain and lower extremity edema  Differential diagnoses include but not limited to PE, pneumonia, pyelonephritis, musculoskeletal   Given patient's risk factors including history of COVID-19 and recent travel will obtain CT PE  Will also obtain D-dimer to evaluate in the setting of potential DVT as duplex is unavailable at this time  Will also obtain UA  UA unremarkable  D-dimer slightly elevated    CT PE was slightly suboptimal but no central PE noted  Given her elevated D-dimer, will give a dose of Lovenox here  Order sent for outpatient venous duplex  Advised patient that she should obtain this as soon as possible  Advised follow-up with primary care physician regarding on emergent evaluation of her incidentally noted thyroid nodule  Return precautions discussed  Amount and/or Complexity of Data Reviewed  Clinical lab tests: ordered and reviewed  Tests in the radiology section of CPT®: ordered and reviewed  Review and summarize past medical records: yes    Risk of Complications, Morbidity, and/or Mortality  Presenting problems: high  Diagnostic procedures: low  Management options: low    Patient Progress  Patient progress: stable      Disposition  Final diagnoses:   Right flank pain   Lower extremity edema   Thyroid nodule     Time reflects when diagnosis was documented in both MDM as applicable and the Disposition within this note     Time User Action Codes Description Comment    7/13/2021  2:30 AM Gary Mo Add [R10 9] Right flank pain     7/13/2021  2:30 AM Gary Mo Add [R60 0] Lower extremity edema     7/13/2021  3:52 PM Gary Mo Add [E04 1] Thyroid nodule       ED Disposition     ED Disposition Condition Date/Time Comment    Discharge Stable Tue Jul 13, 2021  2:30 AM [address] discharge to home/self care              Follow-up Information     Follow up With Specialties Details Why Contact Info    [employer]  Schedule an appointment as soon as possible for a visit   [phone]            Discharge Medication List as of 7/13/2021  2:35 AM      CONTINUE these medications which have NOT CHANGED    Details   Advair HFA 45-21 MCG/ACT inhaler INHALE 2 PUFFS 2 (TWO) TIMES A DAY RINSE MOUTH AFTER USE , Starting Tue 3/2/2021, Normal      albuterol (2 5 mg/3 mL) 0 083 % nebulizer solution TAKE 1 VIAL BY NEBULIZATION EVERY 6 HOURS AS NEEDED FOR WHEEZING OR SHORTNESS OF BREATH, Normal      albuterol (Proventil HFA) 90 mcg/act inhaler Inhale 2 puffs every 6 (six) hours as needed for wheezing, Starting Tue 3/2/2021, Normal      cyclobenzaprine (FLEXERIL) 5 mg tablet Take 2 tablets (10 mg total) by mouth daily at bedtime, Starting Tue 4/6/2021, Normal      ferrous sulfate 324 (65 Fe) mg Take 1 tablet (324 mg total) by mouth every other day, Starting Thu 6/17/2021, Normal      furosemide (LASIX) 20 mg tablet Take 1 tablet (20 mg total) by mouth daily, Starting Thu 6/17/2021, Normal      indomethacin (INDOCIN SR) 75 mg CR capsule Take 1 capsule (75 mg total) by mouth daily with breakfast, Starting Thu 6/17/2021, Normal      metFORMIN (GLUCOPHAGE-XR) 500 mg 24 hr tablet TAKE 1 TABLET BY MOUTH EVERY DAY WITH DINNER, Normal      Riboflavin 400 MG TABS Take 1 tablet (400 mg total) by mouth daily, Starting Tue 4/6/2021, Normal      topiramate (TOPAMAX) 25 mg tablet Take 1 tablet (25 mg total) by mouth daily, Starting Thu 6/17/2021, Normal           Outpatient Discharge Orders   VAS lower limb venous duplex study, complete bilateral   Standing Status: Future Standing Exp  Date: 07/13/25       PDMP Review     None           ED Provider  Attending physically available and evaluated Gino Jeff LO managed the patient along with the ED Attending      Electronically Signed by         Edgardo Bowman MD  07/13/21 9031

## 2021-07-14 ENCOUNTER — APPOINTMENT (EMERGENCY)
Dept: NON INVASIVE DIAGNOSTICS | Facility: HOSPITAL | Age: 36
DRG: 243 | End: 2021-07-14
Payer: COMMERCIAL

## 2021-07-14 ENCOUNTER — HOSPITAL ENCOUNTER (INPATIENT)
Facility: HOSPITAL | Age: 36
LOS: 1 days | Discharge: HOME | DRG: 243 | End: 2021-07-15
Attending: EMERGENCY MEDICINE | Admitting: INTERNAL MEDICINE
Payer: COMMERCIAL

## 2021-07-14 DIAGNOSIS — R13.10 ODYNOPHAGIA: ICD-10-CM

## 2021-07-14 DIAGNOSIS — R13.10 DYSPHAGIA: ICD-10-CM

## 2021-07-14 DIAGNOSIS — R10.9 FLANK PAIN: Primary | ICD-10-CM

## 2021-07-14 DIAGNOSIS — M79.89 LEG SWELLING: ICD-10-CM

## 2021-07-14 LAB
ATRIAL RATE: 74 BPM
P AXIS: 63 DEGREES
PR INTERVAL: 166 MS
QRS AXIS: 53 DEGREES
QRSD INTERVAL: 94 MS
QT INTERVAL: 402 MS
QTC INTERVAL: 446 MS
T WAVE AXIS: 58 DEGREES
VENTRICULAR RATE: 74 BPM

## 2021-07-14 PROCEDURE — 93970 EXTREMITY STUDY: CPT

## 2021-07-14 PROCEDURE — 93010 ELECTROCARDIOGRAM REPORT: CPT | Performed by: INTERNAL MEDICINE

## 2021-07-14 PROCEDURE — 99285 EMERGENCY DEPT VISIT HI MDM: CPT

## 2021-07-14 PROCEDURE — 96372 THER/PROPH/DIAG INJ SC/IM: CPT

## 2021-07-14 PROCEDURE — 99285 EMERGENCY DEPT VISIT HI MDM: CPT | Performed by: EMERGENCY MEDICINE

## 2021-07-14 RX ORDER — KETOROLAC TROMETHAMINE 30 MG/ML
15 INJECTION, SOLUTION INTRAMUSCULAR; INTRAVENOUS ONCE
Status: COMPLETED | OUTPATIENT
Start: 2021-07-14 | End: 2021-07-14

## 2021-07-14 RX ORDER — LIDOCAINE 50 MG/G
1 PATCH TOPICAL ONCE
Status: COMPLETED | OUTPATIENT
Start: 2021-07-14 | End: 2021-07-15

## 2021-07-14 RX ADMIN — LIDOCAINE 1 PATCH: 50 PATCH TOPICAL at 22:57

## 2021-07-14 RX ADMIN — KETOROLAC TROMETHAMINE 15 MG: 30 INJECTION, SOLUTION INTRAMUSCULAR at 22:57

## 2021-07-14 NOTE — Clinical Note
Sushant Sam was seen and treated in our emergency department on 7/14/2021  Diagnosis:     Cindy  may return to work on return date  She may return on this date: 07/16/2021         If you have any questions or concerns, please don't hesitate to call        Gilmar Hernandez MD    ______________________________           _______________          _______________  Hospital Representative                              Date                                Time

## 2021-07-14 NOTE — Clinical Note
Derek Weller was seen and treated in our emergency department on 7/14/2021  Diagnosis:     Cindy  may return to work on return date  She may return on this date: 07/16/2021         If you have any questions or concerns, please don't hesitate to call        Luis Tinoco MD    ______________________________           _______________          _______________  Hospital Representative                              Date                                Time

## 2021-07-14 NOTE — Clinical Note
Cassia Doshi was seen and treated in our emergency department on 7/14/2021  Diagnosis:     Cindy  may return to work on return date  She may return on this date: 07/16/2021         If you have any questions or concerns, please don't hesitate to call        Sarah Zavala MD    ______________________________           _______________          _______________  Hospital Representative                              Date                                Time

## 2021-07-14 NOTE — LETTER
[address] [address]  Dept: [phone]    July 15, 2021     Patient: Tomasz Greene   YOB: 1985   Date of Visit: 7/14/2021       To Whom it May Concern:    Javan East is under my professional care  She was seen in the hospital from 7/14/2021   to 07/15/21  She may return to work on 7/16/21 without limitations  If you have any questions or concerns, please don't hesitate to call           Sincerely,          Renetta Mcdaniel MD

## 2021-07-14 NOTE — Clinical Note
Sheng Nicholas was seen and treated in our emergency department on 7/14/2021  Diagnosis:     Cindy  may return to work on return date  She may return on this date: 07/16/2021         If you have any questions or concerns, please don't hesitate to call        Cally Baptiste MD    ______________________________           _______________          _______________  Hospital Representative                              Date                                Time

## 2021-07-14 NOTE — Clinical Note
Marcelino Gonzales was seen and treated in our emergency department on 7/14/2021  Diagnosis:     Cindy  may return to work on return date  She may return on this date: 07/16/2021         If you have any questions or concerns, please don't hesitate to call        Nena Rodríguez MD    ______________________________           _______________          _______________  Hospital Representative                              Date                                Time

## 2021-07-14 NOTE — Clinical Note
Case was discussed with SOD and the patient's admission status was agreed to be Admission Status: inpatient status to the service of Dr Alissa Madden

## 2021-07-14 NOTE — LETTER
[address]  [address] [place] [address]  Dept: [phone]    July 15, 2021     Patient: Jenaro Enrique   YOB: 1985   Date of Visit: 7/14/2021       To Whom it May Concern:    Jeet Carrera is under my professional care  She was seen in the hospital from 7/14/2021   to 07/15/21  She may return to work on 7/16/21 without limitations  If you have any questions or concerns, please don't hesitate to call           Sincerely,          Dorinda Hernandez MD

## 2021-07-14 NOTE — LETTER
[address]  [address] [place] [address]  Dept: [phone]    July 15, 2021     Patient: Amaury Herrera   YOB: 1985   Date of Visit: 7/14/2021       To Whom it May Concern:    Dixie Lopez is under my professional care  She was seen in the hospital from 7/14/2021   to 07/15/21  She may return to work on 7/16/21 without limitations  If you have any questions or concerns, please don't hesitate to call           Sincerely,          Song Byrd MD

## 2021-07-14 NOTE — LETTER
July 15, 2021       No Recipients    Patient: Bethanie Up   YOB: 1985   Date of Visit: 7/14/2021       Dear Dr Myron Lemons Recipients: Thank you for referring Evelio Alonso to me for evaluation  Below are the relevant portions of my assessment and plan of care  If you have questions, please do not hesitate to call me  I look forward to following Cindy along with you  Sincerely,        No name on file          CC:   No Recipients

## 2021-07-14 NOTE — LETTER
[address] [address]  Dept: [phone]    July 15, 2021     Patient: Tomasz Greene   YOB: 1985   Date of Visit: 7/14/2021       To Whom it May Concern:    Javan Tobys is under my professional care  She was seen in the hospital from 7/14/2021   to 07/15/21  She may return to work on 7/17/21 without limitations  If you have any questions or concerns, please don't hesitate to call           Sincerely,          Renetta Mcdaniel MD

## 2021-07-15 ENCOUNTER — APPOINTMENT (INPATIENT)
Dept: RADIOLOGY | Facility: HOSPITAL | Age: 36
DRG: 243 | End: 2021-07-15
Payer: COMMERCIAL

## 2021-07-15 ENCOUNTER — ANESTHESIA EVENT (INPATIENT)
Dept: GASTROENTEROLOGY | Facility: HOSPITAL | Age: 36
DRG: 243 | End: 2021-07-15
Payer: COMMERCIAL

## 2021-07-15 ENCOUNTER — ANESTHESIA (INPATIENT)
Dept: GASTROENTEROLOGY | Facility: HOSPITAL | Age: 36
DRG: 243 | End: 2021-07-15
Payer: COMMERCIAL

## 2021-07-15 ENCOUNTER — APPOINTMENT (INPATIENT)
Dept: GASTROENTEROLOGY | Facility: HOSPITAL | Age: 36
DRG: 243 | End: 2021-07-15
Payer: COMMERCIAL

## 2021-07-15 VITALS
OXYGEN SATURATION: 96 % | SYSTOLIC BLOOD PRESSURE: 130 MMHG | HEART RATE: 73 BPM | DIASTOLIC BLOOD PRESSURE: 73 MMHG | RESPIRATION RATE: 16 BRPM | TEMPERATURE: 98.6 F

## 2021-07-15 PROBLEM — R10.9 RIGHT FLANK PAIN: Status: ACTIVE | Noted: 2021-07-15

## 2021-07-15 PROBLEM — R13.10 ODYNOPHAGIA: Status: ACTIVE | Noted: 2021-07-15

## 2021-07-15 PROBLEM — G93.2 IDIOPATHIC INTRACRANIAL HYPERTENSION: Status: ACTIVE | Noted: 2021-07-15

## 2021-07-15 LAB
ALBUMIN SERPL BCP-MCNC: 3.1 G/DL (ref 3.5–5)
ALP SERPL-CCNC: 93 U/L (ref 46–116)
ALT SERPL W P-5'-P-CCNC: 27 U/L (ref 12–78)
ANION GAP SERPL CALCULATED.3IONS-SCNC: 5 MMOL/L (ref 4–13)
ANION GAP SERPL CALCULATED.3IONS-SCNC: 7 MMOL/L (ref 4–13)
APTT PPP: 31 SECONDS (ref 23–37)
AST SERPL W P-5'-P-CCNC: 16 U/L (ref 5–45)
ATRIAL RATE: 71 BPM
BASOPHILS # BLD AUTO: 0.05 THOUSANDS/ΜL (ref 0–0.1)
BASOPHILS # BLD AUTO: 0.05 THOUSANDS/ΜL (ref 0–0.1)
BASOPHILS NFR BLD AUTO: 1 % (ref 0–1)
BASOPHILS NFR BLD AUTO: 1 % (ref 0–1)
BILIRUB SERPL-MCNC: <0.1 MG/DL (ref 0.2–1)
BUN SERPL-MCNC: 7 MG/DL (ref 5–25)
BUN SERPL-MCNC: 9 MG/DL (ref 5–25)
CALCIUM ALBUM COR SERPL-MCNC: 9.2 MG/DL (ref 8.3–10.1)
CALCIUM SERPL-MCNC: 8.5 MG/DL (ref 8.3–10.1)
CALCIUM SERPL-MCNC: 8.9 MG/DL (ref 8.3–10.1)
CHLORIDE SERPL-SCNC: 107 MMOL/L (ref 100–108)
CHLORIDE SERPL-SCNC: 108 MMOL/L (ref 100–108)
CO2 SERPL-SCNC: 26 MMOL/L (ref 21–32)
CO2 SERPL-SCNC: 27 MMOL/L (ref 21–32)
CREAT SERPL-MCNC: 0.72 MG/DL (ref 0.6–1.3)
CREAT SERPL-MCNC: 0.73 MG/DL (ref 0.6–1.3)
EOSINOPHIL # BLD AUTO: 0.64 THOUSAND/ΜL (ref 0–0.61)
EOSINOPHIL # BLD AUTO: 0.65 THOUSAND/ΜL (ref 0–0.61)
EOSINOPHIL NFR BLD AUTO: 7 % (ref 0–6)
EOSINOPHIL NFR BLD AUTO: 7 % (ref 0–6)
ERYTHROCYTE [DISTWIDTH] IN BLOOD BY AUTOMATED COUNT: 16.9 % (ref 11.6–15.1)
ERYTHROCYTE [DISTWIDTH] IN BLOOD BY AUTOMATED COUNT: 17.3 % (ref 11.6–15.1)
GFR SERPL CREATININE-BSD FRML MDRD: 107 ML/MIN/1.73SQ M
GFR SERPL CREATININE-BSD FRML MDRD: 109 ML/MIN/1.73SQ M
GLUCOSE SERPL-MCNC: 55 MG/DL (ref 65–140)
GLUCOSE SERPL-MCNC: 98 MG/DL (ref 65–140)
GLUCOSE SERPL-MCNC: 99 MG/DL (ref 65–140)
HCT VFR BLD AUTO: 38.4 % (ref 34.8–46.1)
HCT VFR BLD AUTO: 41.6 % (ref 34.8–46.1)
HGB BLD-MCNC: 11.4 G/DL (ref 11.5–15.4)
HGB BLD-MCNC: 12 G/DL (ref 11.5–15.4)
IMM GRANULOCYTES # BLD AUTO: 0.02 THOUSAND/UL (ref 0–0.2)
IMM GRANULOCYTES # BLD AUTO: 0.02 THOUSAND/UL (ref 0–0.2)
IMM GRANULOCYTES NFR BLD AUTO: 0 % (ref 0–2)
IMM GRANULOCYTES NFR BLD AUTO: 0 % (ref 0–2)
INR PPP: 0.93 (ref 0.84–1.19)
LYMPHOCYTES # BLD AUTO: 2.32 THOUSANDS/ΜL (ref 0.6–4.47)
LYMPHOCYTES # BLD AUTO: 2.47 THOUSANDS/ΜL (ref 0.6–4.47)
LYMPHOCYTES NFR BLD AUTO: 26 % (ref 14–44)
LYMPHOCYTES NFR BLD AUTO: 27 % (ref 14–44)
MAGNESIUM SERPL-MCNC: 2 MG/DL (ref 1.6–2.6)
MCH RBC QN AUTO: 24.2 PG (ref 26.8–34.3)
MCH RBC QN AUTO: 24.2 PG (ref 26.8–34.3)
MCHC RBC AUTO-ENTMCNC: 28.8 G/DL (ref 31.4–37.4)
MCHC RBC AUTO-ENTMCNC: 29.7 G/DL (ref 31.4–37.4)
MCV RBC AUTO: 82 FL (ref 82–98)
MCV RBC AUTO: 84 FL (ref 82–98)
MONOCYTES # BLD AUTO: 0.51 THOUSAND/ΜL (ref 0.17–1.22)
MONOCYTES # BLD AUTO: 0.55 THOUSAND/ΜL (ref 0.17–1.22)
MONOCYTES NFR BLD AUTO: 6 % (ref 4–12)
MONOCYTES NFR BLD AUTO: 6 % (ref 4–12)
NEUTROPHILS # BLD AUTO: 5.27 THOUSANDS/ΜL (ref 1.85–7.62)
NEUTROPHILS # BLD AUTO: 5.56 THOUSANDS/ΜL (ref 1.85–7.62)
NEUTS SEG NFR BLD AUTO: 59 % (ref 43–75)
NEUTS SEG NFR BLD AUTO: 60 % (ref 43–75)
NRBC BLD AUTO-RTO: 0 /100 WBCS
NRBC BLD AUTO-RTO: 0 /100 WBCS
P AXIS: 50 DEGREES
PHOSPHATE SERPL-MCNC: 3.3 MG/DL (ref 2.7–4.5)
PLATELET # BLD AUTO: 336 THOUSANDS/UL (ref 149–390)
PLATELET # BLD AUTO: 342 THOUSANDS/UL (ref 149–390)
PMV BLD AUTO: 10.1 FL (ref 8.9–12.7)
PMV BLD AUTO: 10.5 FL (ref 8.9–12.7)
POTASSIUM SERPL-SCNC: 3.1 MMOL/L (ref 3.5–5.3)
POTASSIUM SERPL-SCNC: 3.5 MMOL/L (ref 3.5–5.3)
PR INTERVAL: 158 MS
PROT SERPL-MCNC: 7.9 G/DL (ref 6.4–8.2)
PROTHROMBIN TIME: 12.5 SECONDS (ref 11.6–14.5)
QRS AXIS: 38 DEGREES
QRSD INTERVAL: 90 MS
QT INTERVAL: 400 MS
QTC INTERVAL: 434 MS
RBC # BLD AUTO: 4.71 MILLION/UL (ref 3.81–5.12)
RBC # BLD AUTO: 4.96 MILLION/UL (ref 3.81–5.12)
SODIUM SERPL-SCNC: 139 MMOL/L (ref 136–145)
SODIUM SERPL-SCNC: 141 MMOL/L (ref 136–145)
T WAVE AXIS: 47 DEGREES
VENTRICULAR RATE: 71 BPM
WBC # BLD AUTO: 8.85 THOUSAND/UL (ref 4.31–10.16)
WBC # BLD AUTO: 9.26 THOUSAND/UL (ref 4.31–10.16)

## 2021-07-15 PROCEDURE — 0DB38ZX EXCISION OF LOWER ESOPHAGUS, VIA NATURAL OR ARTIFICIAL OPENING ENDOSCOPIC, DIAGNOSTIC: ICD-10-PCS | Performed by: INTERNAL MEDICINE

## 2021-07-15 PROCEDURE — 43239 EGD BIOPSY SINGLE/MULTIPLE: CPT | Performed by: INTERNAL MEDICINE

## 2021-07-15 PROCEDURE — 0DB18ZX EXCISION OF UPPER ESOPHAGUS, VIA NATURAL OR ARTIFICIAL OPENING ENDOSCOPIC, DIAGNOSTIC: ICD-10-PCS | Performed by: INTERNAL MEDICINE

## 2021-07-15 PROCEDURE — 80053 COMPREHEN METABOLIC PANEL: CPT

## 2021-07-15 PROCEDURE — 74176 CT ABD & PELVIS W/O CONTRAST: CPT

## 2021-07-15 PROCEDURE — 85025 COMPLETE CBC W/AUTO DIFF WBC: CPT | Performed by: STUDENT IN AN ORGANIZED HEALTH CARE EDUCATION/TRAINING PROGRAM

## 2021-07-15 PROCEDURE — 85610 PROTHROMBIN TIME: CPT

## 2021-07-15 PROCEDURE — 0DB98ZX EXCISION OF DUODENUM, VIA NATURAL OR ARTIFICIAL OPENING ENDOSCOPIC, DIAGNOSTIC: ICD-10-PCS | Performed by: INTERNAL MEDICINE

## 2021-07-15 PROCEDURE — 93010 ELECTROCARDIOGRAM REPORT: CPT | Performed by: INTERNAL MEDICINE

## 2021-07-15 PROCEDURE — 99244 OFF/OP CNSLTJ NEW/EST MOD 40: CPT | Performed by: INTERNAL MEDICINE

## 2021-07-15 PROCEDURE — C9113 INJ PANTOPRAZOLE SODIUM, VIA: HCPCS

## 2021-07-15 PROCEDURE — 80048 BASIC METABOLIC PNL TOTAL CA: CPT | Performed by: STUDENT IN AN ORGANIZED HEALTH CARE EDUCATION/TRAINING PROGRAM

## 2021-07-15 PROCEDURE — 36415 COLL VENOUS BLD VENIPUNCTURE: CPT

## 2021-07-15 PROCEDURE — 88305 TISSUE EXAM BY PATHOLOGIST: CPT | Performed by: PATHOLOGY

## 2021-07-15 PROCEDURE — 93970 EXTREMITY STUDY: CPT | Performed by: SURGERY

## 2021-07-15 PROCEDURE — 84100 ASSAY OF PHOSPHORUS: CPT

## 2021-07-15 PROCEDURE — G1004 CDSM NDSC: HCPCS

## 2021-07-15 PROCEDURE — 85730 THROMBOPLASTIN TIME PARTIAL: CPT

## 2021-07-15 PROCEDURE — 71046 X-RAY EXAM CHEST 2 VIEWS: CPT

## 2021-07-15 PROCEDURE — 83735 ASSAY OF MAGNESIUM: CPT

## 2021-07-15 PROCEDURE — 85025 COMPLETE CBC W/AUTO DIFF WBC: CPT

## 2021-07-15 PROCEDURE — 82948 REAGENT STRIP/BLOOD GLUCOSE: CPT

## 2021-07-15 PROCEDURE — 0DB68ZX EXCISION OF STOMACH, VIA NATURAL OR ARTIFICIAL OPENING ENDOSCOPIC, DIAGNOSTIC: ICD-10-PCS | Performed by: INTERNAL MEDICINE

## 2021-07-15 PROCEDURE — 93005 ELECTROCARDIOGRAM TRACING: CPT

## 2021-07-15 RX ORDER — PANTOPRAZOLE SODIUM 40 MG/1
40 TABLET, DELAYED RELEASE ORAL DAILY
Qty: 30 TABLET | Refills: 0 | Status: SHIPPED | OUTPATIENT
Start: 2021-07-15 | End: 2021-09-29 | Stop reason: SDUPTHER

## 2021-07-15 RX ORDER — FUROSEMIDE 20 MG/1
20 TABLET ORAL DAILY
Status: DISCONTINUED | OUTPATIENT
Start: 2021-07-15 | End: 2021-07-16 | Stop reason: HOSPADM

## 2021-07-15 RX ORDER — PANTOPRAZOLE SODIUM 40 MG/1
40 TABLET, DELAYED RELEASE ORAL
Status: DISCONTINUED | OUTPATIENT
Start: 2021-07-15 | End: 2021-07-16 | Stop reason: HOSPADM

## 2021-07-15 RX ORDER — TOPIRAMATE 25 MG/1
25 TABLET ORAL DAILY
Status: DISCONTINUED | OUTPATIENT
Start: 2021-07-15 | End: 2021-07-16 | Stop reason: HOSPADM

## 2021-07-15 RX ORDER — DEXTROSE AND SODIUM CHLORIDE 5; .9 G/100ML; G/100ML
75 INJECTION, SOLUTION INTRAVENOUS CONTINUOUS
Status: DISCONTINUED | OUTPATIENT
Start: 2021-07-15 | End: 2021-07-16 | Stop reason: HOSPADM

## 2021-07-15 RX ORDER — SUCRALFATE ORAL 1 G/10ML
1 SUSPENSION ORAL
Qty: 1200 ML | Refills: 0 | Status: SHIPPED | OUTPATIENT
Start: 2021-07-15 | End: 2021-08-14

## 2021-07-15 RX ORDER — GLYCOPYRROLATE 0.2 MG/ML
INJECTION INTRAMUSCULAR; INTRAVENOUS AS NEEDED
Status: DISCONTINUED | OUTPATIENT
Start: 2021-07-15 | End: 2021-07-15

## 2021-07-15 RX ORDER — PROPOFOL 10 MG/ML
INJECTION, EMULSION INTRAVENOUS AS NEEDED
Status: DISCONTINUED | OUTPATIENT
Start: 2021-07-15 | End: 2021-07-15

## 2021-07-15 RX ORDER — ONDANSETRON 2 MG/ML
4 INJECTION INTRAMUSCULAR; INTRAVENOUS ONCE
Status: COMPLETED | OUTPATIENT
Start: 2021-07-15 | End: 2021-07-15

## 2021-07-15 RX ORDER — POTASSIUM CHLORIDE 14.9 MG/ML
20 INJECTION INTRAVENOUS ONCE
Status: DISCONTINUED | OUTPATIENT
Start: 2021-07-15 | End: 2021-07-15

## 2021-07-15 RX ORDER — POTASSIUM CHLORIDE 14.9 MG/ML
20 INJECTION INTRAVENOUS 3 TIMES DAILY
Status: DISCONTINUED | OUTPATIENT
Start: 2021-07-15 | End: 2021-07-16 | Stop reason: HOSPADM

## 2021-07-15 RX ORDER — ALBUTEROL SULFATE 90 UG/1
2 AEROSOL, METERED RESPIRATORY (INHALATION) EVERY 6 HOURS PRN
Status: DISCONTINUED | OUTPATIENT
Start: 2021-07-15 | End: 2021-07-16 | Stop reason: HOSPADM

## 2021-07-15 RX ORDER — FLUTICASONE FUROATE AND VILANTEROL 100; 25 UG/1; UG/1
1 POWDER RESPIRATORY (INHALATION) DAILY
Status: DISCONTINUED | OUTPATIENT
Start: 2021-07-15 | End: 2021-07-16 | Stop reason: HOSPADM

## 2021-07-15 RX ORDER — SODIUM CHLORIDE 9 MG/ML
75 INJECTION, SOLUTION INTRAVENOUS CONTINUOUS
Status: DISCONTINUED | OUTPATIENT
Start: 2021-07-15 | End: 2021-07-15

## 2021-07-15 RX ORDER — SODIUM CHLORIDE, SODIUM GLUCONATE, SODIUM ACETATE, POTASSIUM CHLORIDE, MAGNESIUM CHLORIDE, SODIUM PHOSPHATE, DIBASIC, AND POTASSIUM PHOSPHATE .53; .5; .37; .037; .03; .012; .00082 G/100ML; G/100ML; G/100ML; G/100ML; G/100ML; G/100ML; G/100ML
125 INJECTION, SOLUTION INTRAVENOUS CONTINUOUS
Status: DISCONTINUED | OUTPATIENT
Start: 2021-07-15 | End: 2021-07-15

## 2021-07-15 RX ORDER — KETAMINE HYDROCHLORIDE 50 MG/ML
INJECTION, SOLUTION, CONCENTRATE INTRAMUSCULAR; INTRAVENOUS AS NEEDED
Status: DISCONTINUED | OUTPATIENT
Start: 2021-07-15 | End: 2021-07-15

## 2021-07-15 RX ORDER — PANTOPRAZOLE SODIUM 40 MG/1
40 INJECTION, POWDER, FOR SOLUTION INTRAVENOUS ONCE
Status: COMPLETED | OUTPATIENT
Start: 2021-07-15 | End: 2021-07-15

## 2021-07-15 RX ADMIN — KETAMINE HYDROCHLORIDE 15 MG: 50 INJECTION, SOLUTION INTRAMUSCULAR; INTRAVENOUS at 12:59

## 2021-07-15 RX ADMIN — KETAMINE HYDROCHLORIDE 5 MG: 50 INJECTION, SOLUTION INTRAMUSCULAR; INTRAVENOUS at 13:07

## 2021-07-15 RX ADMIN — PROPOFOL 40 MG: 10 INJECTION, EMULSION INTRAVENOUS at 13:01

## 2021-07-15 RX ADMIN — PROPOFOL 40 MG: 10 INJECTION, EMULSION INTRAVENOUS at 13:11

## 2021-07-15 RX ADMIN — SODIUM CHLORIDE 75 ML/HR: 0.9 INJECTION, SOLUTION INTRAVENOUS at 02:24

## 2021-07-15 RX ADMIN — PROPOFOL 40 MG: 10 INJECTION, EMULSION INTRAVENOUS at 13:09

## 2021-07-15 RX ADMIN — TOPIRAMATE 25 MG: 25 TABLET, FILM COATED ORAL at 05:17

## 2021-07-15 RX ADMIN — PANTOPRAZOLE SODIUM 40 MG: 40 INJECTION, POWDER, FOR SOLUTION INTRAVENOUS at 02:24

## 2021-07-15 RX ADMIN — FUROSEMIDE 20 MG: 20 TABLET ORAL at 08:22

## 2021-07-15 RX ADMIN — ONDANSETRON 4 MG: 2 INJECTION INTRAMUSCULAR; INTRAVENOUS at 13:48

## 2021-07-15 RX ADMIN — PROPOFOL 40 MG: 10 INJECTION, EMULSION INTRAVENOUS at 13:14

## 2021-07-15 RX ADMIN — DEXTROSE AND SODIUM CHLORIDE 75 ML/HR: 5; .9 INJECTION, SOLUTION INTRAVENOUS at 13:02

## 2021-07-15 RX ADMIN — PROPOFOL 40 MG: 10 INJECTION, EMULSION INTRAVENOUS at 13:03

## 2021-07-15 RX ADMIN — PROPOFOL 40 MG: 10 INJECTION, EMULSION INTRAVENOUS at 13:07

## 2021-07-15 RX ADMIN — LIDOCAINE HYDROCHLORIDE 10 ML: 20 SOLUTION ORAL; TOPICAL at 05:19

## 2021-07-15 RX ADMIN — PROPOFOL 100 MG: 10 INJECTION, EMULSION INTRAVENOUS at 12:59

## 2021-07-15 RX ADMIN — POTASSIUM CHLORIDE 20 MEQ: 14.9 INJECTION, SOLUTION INTRAVENOUS at 08:26

## 2021-07-15 RX ADMIN — PROPOFOL 60 MG: 10 INJECTION, EMULSION INTRAVENOUS at 13:05

## 2021-07-15 RX ADMIN — DEXTROSE AND SODIUM CHLORIDE 75 ML/HR: 5; .9 INJECTION, SOLUTION INTRAVENOUS at 07:55

## 2021-07-15 RX ADMIN — PANTOPRAZOLE SODIUM 40 MG: 40 TABLET, DELAYED RELEASE ORAL at 05:18

## 2021-07-15 RX ADMIN — GLYCOPYRROLATE 0.1 MG: 0.2 INJECTION, SOLUTION INTRAMUSCULAR; INTRAVENOUS at 12:59

## 2021-07-15 NOTE — ASSESSMENT & PLAN NOTE
Patient has suspected IIH, but this has not been confirmed with an LP as patient has not yet seen Neurology  Patient says that her home medications have helped her and she does not currently have a headache    · Continue home medications  · Lasix 20 mg daily  · Topamax 25 mg at night, will give her home dose today

## 2021-07-15 NOTE — TELEPHONE ENCOUNTER
Looked for a sooner opening  [address] [facility], JULIO CESAR, MUKUL KRAMER, JULIO CESAR, AN  All residents    Nothing sooner  Richrd Erna for all possibilities

## 2021-07-15 NOTE — ANESTHESIA POSTPROCEDURE EVALUATION
Post-Op Assessment Note    CV Status:  Stable       Mental Status:  Sleepy   Hydration Status:  Stable   PONV Controlled:  None   Airway Patency:  Patent      Post Op Vitals Reviewed: Yes      Staff: CRNA         No complications documented  BP   120/71   Temp      Pulse  81   Resp   18   SpO2   97% on 6LFM   Post procedure VS noted above, SV non obstructed

## 2021-07-15 NOTE — ASSESSMENT & PLAN NOTE
Patient has history of asthma  Currently has needed her albuterol inhaler slightly more than usual   Will continue home medications as parent patient appears comfortable and not short of breath at this time  · Albuterol 90 mcg/act inhaler p r n  · Advair inhaler b i d

## 2021-07-15 NOTE — DISCHARGE SUMMARY
[address] [facility]  Discharge- [facility] 68 1985, 28 y o  female MRN: [id number]  Unit/Bed#: ODETTE Encounter: [id number]  Primary Care Provider: Maria Del Rosario García DO   Date and time admitted to hospital: 7/14/2021  9:19 PM    Right flank pain  Assessment & Plan  Ongoing intermittent right flank pain  CT abdomen done in the ER shows no evidence of calculi  Idiopathic intracranial hypertension  Assessment & Plan  Patient has suspected IIH, but this has not been confirmed with an LP as patient has not yet seen Neurology  Patient says that her home medications have helped her and she does not currently have a headache  · Continue home medications  · Lasix 20 mg daily  · Topamax 25 mg at night, will give her home dose today    Mild intermittent asthma without complication  Assessment & Plan  Patient has history of asthma  Currently has needed her albuterol inhaler slightly more than usual   Will continue home medications as parent patient appears comfortable and not short of breath at this time  · Albuterol 90 mcg/act inhaler p r n  · Advair inhaler b i d     SOB (shortness of breath)  Assessment & Plan  Patient has history of asthma but endorses increased shortness breath at baseline since her diagnosis with COVID-19 (tested positive in December, May)  Patient was recently seen in the ED with flank pain and shortness of breath  She was worked up for possible PE  CTA was negative (although study was limited), she was given 1 dose of Lovenox at that time, VAS lower extremity Doppler also negative  She is currently lying in bed comfortably in no acute respiratory distress  Suspect this increase in SOB is a long-term sequelae of her COVID-19 infection  · Continue to monitor  · Continue home asthma medication  · Outpatient follow-up  · Stable on discharge    * Odynophagia  Assessment & Plan  Patient has new onset pain with swallowing    She was recently seen in the ED for flank pain and shortness of breath and was given 1 dose of Lovenox  After being discharged home, she had an episode of nausea and self-induced vomiting  She noticed a little bit of blood in her vomit  Since then, she has had pain with swallowing  Suspect that her pain is secondary to esophageal irritation or minor tearing secondary to vomiting  She is able to keep food and liquid down  Underwent EGD 7/15, suggestive of mild esophagitis  No other concerning findings  Biopsies taken  Recommended PPI Protonix 40 mg daily along with Carafate  Outpatient GI follow-up symptoms persist       Discharging Resident Physician: Janes Gaona MD  Attending: Jeannette Drew MD  PCP: Fartun Huitron DO  Admission Date: 7/14/2021  Discharge Date: 07/15/21    Disposition:     Home    Reason for Admission: [address] [place] [address] Stay:  · GI    Procedures Performed:     · EGD    Significant Findings / Test Results:     · Mild esophagitis    Incidental Findings:   · None     Test Results Pending at Discharge (will require follow up): · Biopsies taken from esophagus, stomach, and duodenum     Outpatient Tests Requested:  · None    Complications:  None    Hospital Course:     Gino Aguilar is a 28 y o  female patient with past medical history including but not limited to [place] infection January, asthma who originally presented to the hospital on 7/14/2021 due to primary complaints of right flank pain and worsening painful swallowing  Vitals were stable in ER  CT scan of the abdomen did not reveal any calculi  Underwent EGD with GI team showing mild esophagitis  Biopsies were taken  Patient was recommended to be continued on Protonix 40 daily along with Carafate  Patient was recommended to follow-up outpatient GI if symptoms persist   Plan discussed with mother present at bedside and she is in agreement  Work note provided      Condition at Discharge: fair     Discharge Day Visit / Exam: Subjective:  Patient seen and examined at bedside  She reports improvement in the flank pain with the lidocaine patch  No other complaints postprocedure  Feeling well  Vitals: Blood Pressure: 130/73 (07/15/21 1338)  Pulse: 73 (07/15/21 1338)  Temperature: 98 6 °F (37 °C) (07/15/21 1323)  Temp Source: Tympanic (07/15/21 1323)  Respirations: 16 (07/15/21 1338)  SpO2: 96 % (07/15/21 1338)    Discussion with Family:  Discussed at length with the mother at bedside  Discharge instructions/Information to patient and family:   See after visit summary for information provided to patient and family  Provisions for Follow-Up Care:  See after visit summary for information related to follow-up care and any pertinent home health orders  Planned Readmission: None     Discharge Medications:  See after visit summary for reconciled discharge medications provided to patient and family        ** Please Note: This note has been constructed using a voice recognition system **

## 2021-07-15 NOTE — UTILIZATION REVIEW
Inpatient Admission Authorization Request   NOTIFICATION OF INPATIENT ADMISSION/INPATIENT AUTHORIZATION REQUEST   SERVICING FACILITY:   [facility]  Address: [address] [place] [address], [address] [place] [address]  Tax ID: [id number]  NPI: [id number]  Place of Service: Inpatient [facility]  [address]  Place of Service Code: 24     ATTENDING PROVIDER:  Attending Name and NPI#: Katherine Carolina Md [[id number]]  Address: [address] [place] [address], [address] [place] [id number]  Phone: [phone]     UTILIZATION REVIEW CONTACT:  Suzan Gu[facility] Utilization   Network Utilization Review Department  Phone: [phone]  Fax: [phone]  Email: [email or web address]     PHYSICIAN ADVISORY SERVICES:  FOR ECPO-NM-VDJP REVIEW - MEDICAL NECESSITY DENIAL  Phone: [phone]  Fax: [phone]  Email: [email or web address]     TYPE OF REQUEST:  Inpatient Status     ADMISSION INFORMATION:  ADMISSION DATE/TIME: 7/15/21  1:33 AM  PATIENT DIAGNOSIS CODE/DESCRIPTION:  Flank pain [R10 9]  DISCHARGE DATE/TIME: No discharge date for patient encounter  DISCHARGE DISPOSITION (IF DISCHARGED): Home/Self Care     IMPORTANT INFORMATION:  Please contact the Suzan Guererro directly with any questions or concerns regarding this request  Department voicemails are confidential     Send requests for admission clinical reviews, concurrent reviews, approvals, and administrative denials due to lack of clinical to fax [phone]

## 2021-07-15 NOTE — ASSESSMENT & PLAN NOTE
Patient has history of asthma but endorses increased shortness breath at baseline since her diagnosis with COVID-19 (tested positive in December, May)  Patient was recently seen in the ED with flank pain and shortness of breath  She was worked up for possible PE  CTA was negative (although study was limited), she was given 1 dose of Lovenox at that time, VAS lower extremity Doppler also negative  She is currently lying in bed comfortably in no acute respiratory distress  Suspect this increase in SOB is a long-term sequelae of her COVID-19 infection    · Continue to monitor  · Continue home asthma medication  · Outpatient follow-up

## 2021-07-15 NOTE — ED PROVIDER NOTES
History  Chief Complaint   Patient presents with    Flank Pain     pt c/o reoccuring flank pain, SOB, and for a rule out blood clot  pt had elevated d-dimer on monday and discharged home on lovenox     Cindy is a 24-year-old lady who presents with flank pain, shortness of breath, as well as difficulty swallowing  She reports pain has been going on for several months, but has been worsening over the last week, rates as 9/10, nonradiating, no alleviating or aggravating factors  She reports some associated SOB as well as an episode of coughing up sputum which look blood tinged  Denies any other symptoms such as fevers, chills, nausea, vomiting  She has a history of COVID infection, as well as history of recent travel to [facility]  She was seen here in the emergency department several days ago, where she was worked up to rule out a pulmonary embolism  Workup at that time included CTA to rule out PE, and urinalysis, both of which were unremarkable  However her imaging was was inconclusive due to the quality of the study   She was discharged with a dose of Lovenox, and recommended to have urgent bilateral venous ultrasound  Patient states she was unable to obtain the ultrasound on outpatient basis, and return to the ED because her pain has been persistent, and she still feels mildly short of breath  She also has complaints of swallowing difficulties  She reports that this started a couple days ago after administration of the Lovenox when she was last seen here  She reported a tightness in her chest after eating, an episode of vomiting with blood tinged sputum, and then persistent pain whenever she swallows for the last two days  She has never had pain like this before  Denies any alleviating factors  Reports associated n/v            Prior to Admission Medications   Prescriptions Last Dose Informant Patient Reported? Taking?    Advair HFA 45-21 MCG/ACT inhaler 7/15/2021 at Unknown time  No Yes   Sig: INHALE 2 PUFFS 2 (TWO) TIMES A DAY RINSE MOUTH AFTER USE  Riboflavin 400 MG TABS Past Week at Unknown time  No Yes   Sig: Take 1 tablet (400 mg total) by mouth daily   albuterol (2 5 mg/3 mL) 0 083 % nebulizer solution Past Week at Unknown time  No Yes   Sig: TAKE 1 VIAL BY NEBULIZATION EVERY 6 HOURS AS NEEDED FOR WHEEZING OR SHORTNESS OF BREATH   albuterol (Proventil HFA) 90 mcg/act inhaler 7/15/2021 at Unknown time  No Yes   Sig: Inhale 2 puffs every 6 (six) hours as needed for wheezing   cyclobenzaprine (FLEXERIL) 5 mg tablet Not Taking at Unknown time  No No   Sig: Take 2 tablets (10 mg total) by mouth daily at bedtime   Patient not taking: Reported on 7/15/2021   ferrous sulfate 324 (65 Fe) mg 2021 at Unknown time  No Yes   Sig: Take 1 tablet (324 mg total) by mouth every other day   furosemide (LASIX) 20 mg tablet 2021 at Unknown time  No Yes   Sig: Take 1 tablet (20 mg total) by mouth daily   indomethacin (INDOCIN SR) 75 mg CR capsule 7/15/2021 at Unknown time  No Yes   Sig: Take 1 capsule (75 mg total) by mouth daily with breakfast   topiramate (TOPAMAX) 25 mg tablet 2021 at Unknown time  No Yes   Sig: Take 1 tablet (25 mg total) by mouth daily      Facility-Administered Medications: None       Past Medical History:   Diagnosis Date    Asthma        Past Surgical History:   Procedure Laterality Date     SECTION         Family History   Problem Relation Age of Onset    No Known Problems Other     Arthritis Mother     Fibromyalgia Mother     Diabetes Family     Cancer Family     Heart disease Family      I have reviewed and agree with the history as documented      E-Cigarette/Vaping    E-Cigarette Use Never User      E-Cigarette/Vaping Substances    Nicotine No     THC No     CBD No     Flavoring No     Other No     Unknown No      Social History     Tobacco Use    Smoking status: Never Smoker    Smokeless tobacco: Never Used   Vaping Use    Vaping Use: Never used Substance Use Topics    Alcohol use: No    Drug use: No        Review of Systems    Physical Exam  ED Triage Vitals   Temperature Pulse Respirations Blood Pressure SpO2   07/14/21 2035 07/14/21 2035 07/14/21 2035 07/14/21 2035 07/14/21 2035   97 8 °F (36 6 °C) 85 18 166/74 97 %      Temp Source Heart Rate Source Patient Position - Orthostatic VS BP Location FiO2 (%)   07/14/21 2035 07/14/21 2035 07/14/21 2035 07/14/21 2035 --   Tympanic Monitor Lying Right arm       Pain Score       07/14/21 2123       9             Orthostatic Vital Signs  Vitals:    07/14/21 2035 07/14/21 2123 07/15/21 0027   BP: 166/74 155/99 145/65   Pulse: 85 90 79   Patient Position - Orthostatic VS: Lying Sitting Lying       Physical Exam    ED Medications  Medications   lidocaine (LIDODERM) 5 % patch 1 patch (1 patch Topical Medication Applied 7/14/21 2257)   multi-electrolyte (PLASMALYTE-A/ISOLYTE-S PH 7 4) IV solution (has no administration in time range)   pantoprazole (PROTONIX) injection 40 mg (has no administration in time range)   ketorolac (TORADOL) injection 15 mg (15 mg Intramuscular Given 7/14/21 2257)       Diagnostic Studies  Results Reviewed     Procedure Component Value Units Date/Time    Protime-INR [[id number]]  (Normal) Collected: 07/15/21 0053    Lab Status: Final result Specimen: Blood from Arm, Right Updated: 07/15/21 0132     Protime 12 5 seconds      INR 0 93    APTT [[id number]]  (Normal) Collected: 07/15/21 0053    Lab Status: Final result Specimen: Blood from Arm, Right Updated: 07/15/21 0132     PTT 31 seconds     Comprehensive metabolic panel [[id number]]  (Abnormal) Collected: 07/15/21 0053    Lab Status: Final result Specimen: Blood from Arm, Right Updated: 07/15/21 0128     Sodium 139 mmol/L      Potassium 3 5 mmol/L      Chloride 108 mmol/L      CO2 26 mmol/L      ANION GAP 5 mmol/L      BUN 9 mg/dL      Creatinine 0 73 mg/dL      Glucose 98 mg/dL      Calcium 8 5 mg/dL      Corrected Calcium 9 2 mg/dL AST 16 U/L      ALT 27 U/L      Alkaline Phosphatase 93 U/L      Total Protein 7 9 g/dL      Albumin 3 1 g/dL      Total Bilirubin <0 10 mg/dL      eGFR 107 ml/min/1 73sq m     Narrative:      [place] guidelines for Chronic Kidney Disease (CKD):     Stage 1 with normal or high GFR (GFR > 90 mL/min/1 73 square meters)    Stage 2 Mild CKD (GFR = 60-89 mL/min/1 73 square meters)    Stage 3A Moderate CKD (GFR = 45-59 mL/min/1 73 square meters)    Stage 3B Moderate CKD (GFR = 30-44 mL/min/1 73 square meters)    Stage 4 Severe CKD (GFR = 15-29 mL/min/1 73 square meters)    Stage 5 End Stage CKD (GFR <15 mL/min/1 73 square meters)  Note: GFR calculation is accurate only with a steady state creatinine    CBC and differential [[id number]] Collected: 07/15/21 0053    Lab Status: In process Specimen: Blood from Arm, Right Updated: 07/15/21 0102                 VAS lower limb venous duplex study, complete bilateral    (Results Pending)   XR chest 2 views    (Results Pending)         Procedures  Procedures      ED Course                             SBIRT 22yo+      Most Recent Value   SBIRT (24 yo +)   In order to provide better care to our patients, we are screening all of our patients for alcohol and drug use  Would it be okay to ask you these screening questions? Yes Filed at: 07/14/2021 2138   Initial Alcohol Screen: US AUDIT-C    1  How often do you have a drink containing alcohol?  0 Filed at: 07/14/2021 2138   2  How many drinks containing alcohol do you have on a typical day you are drinking? 0 Filed at: 07/14/2021 2138   3b  FEMALE Any Age, or MALE 65+: How often do you have 4 or more drinks on one occassion? 0 Filed at: 07/14/2021 2138   Audit-C Score  0 Filed at: 07/14/2021 2138   LETY: How many times in the past year have you    Used an illegal drug or used a prescription medication for non-medical reasons?   Never Filed at: 07/14/2021 2138                MDM  Number of Diagnoses or Management Options    Cindy is a 66-year-old lady who presents with persistent right flank pain, leg swelling, shortness of breath  Vital signs stable, physical exam remarkable for flank pain on her right side  The differential includes DVT versus PE versus long COVID syndrome versus musculoskeletal inflammation versus other hematological abnormality  In the context of her persistent shortness of breath, leg swelling, has affected the patient has been unable to schedule an outpatient ultrasound, ordered duplex ultrasound bilaterally to rule out a DVT  Toradol and a lidocaine patch for pain control  Reviewed prior imaging from her last ED visit  There appears to be no no lesions consistent with intraparenchymal pulmonary pathology including effusions or pneumonia that could explain her episode of hemoptysis or swallowing difficulties  Duplex ultrasound study was negative bilaterally for any DVT  Discussed extensively with the patient that their appears to be no etiology that could explain her shortness of breath or leg swelling  In conjunction with her prior imaging studies, as well as her current negative duplex ultrasound scan, feel confident that there is no emergent etiology that could be the source of her SOB and swelling symptoms  In regard to her swallowing difficulties, her symptoms of dysphagia and odynophagia are much more concerning  The differential includes Sally-Webb tear versus Boerhaave syndrome versus esophagitis versus other esophageal or oropharyngeal etiology  She is stable now, but feel she will need further workup urgently  Advised to be admitted to the hospital with GI follow-up in the morning  Discussed with the SOD resident  Agreed to admit under inpatient status under Dr Angel Guzman  Ordered CXR and Protonix as well  Discussed the care and treatment plan with the patient   Reviewed test results and/or imaging, as well as pertinent details of the treatment plan  The patient expressed understanding, was agreeable to the plan of care, and had no further questions or concerns  Admitted to SOD  Disposition  Final diagnoses:   Flank pain   Leg swelling   Dysphagia   Odynophagia     Time reflects when diagnosis was documented in both MDM as applicable and the Disposition within this note     Time User Action Codes Description Comment    7/15/2021 12:00 AM Amy Cools Add [R10 9] Flank pain     7/15/2021 12:00 AM Amy Cools Add [M79 89] Leg swelling     7/15/2021 12:42 AM Amy Cools Add [R13 10] Dysphagia     7/15/2021 12:43 AM Amy Cools Add [R13 10] Odynophagia       ED Disposition     ED Disposition Condition Date/Time Comment    Admit Stable Thu Jul 15, 2021  1:15 AM Case was discussed with SOD and the patient's admission status was agreed to be Admission Status: inpatient status to the service of Dr Michelle Berg  Follow-up Information     Follow up With Specialties Details Why Contact Info Additional [address] Emergency Department Emergency Medicine  If symptoms worsen [address] [place] [address] Emergency Department, [address] [place] [address], [address] [phone]          Patient's Medications   Discharge Prescriptions    No medications on file     No discharge procedures on file  PDMP Review     None           ED Provider  Attending physically available and evaluated Christopher Rick I managed the patient along with the ED Attending      Electronically Signed by         Miguel Sosa MD  07/15/21 5497

## 2021-07-15 NOTE — ED ATTENDING ATTESTATION
7/12/2021  Judi LO Head, DO, saw and evaluated the patient  I have discussed the patient with the resident/non-physician practitioner and agree with the resident's/non-physician practitioner's findings, Plan of Care, and MDM as documented in the resident's/non-physician practitioner's note, except where noted  All available labs and Radiology studies were reviewed  I was present for key portions of any procedure(s) performed by the resident/non-physician practitioner and I was immediately available to provide assistance  At this point I agree with the current assessment done in the Emergency Department  I have conducted an independent evaluation of this patient a history and physical is as follows:    29yo female presents with right flank pain  PT has had pain for past few months since getting COVID but for past 2 weeks it is much worse  Pain is pleuritic and she c/o SOB at times  Also having b/l LE swelling after driving to [facility]  RLE more swollen than left  No f/c, no abd pain, no N/V/D  ON exam - nad, heart reg, lungs clear, no CVA tenderness  Plan - CTA chest to r/o PE, check labs including ddimer  If dimer elevated and CT neg for PE will need emergent oupatient duplex and dose of lovenox in ed       ED Course         Critical Care Time  Procedures

## 2021-07-15 NOTE — INCIDENTAL FINDINGS
The following findings require follow up:  Radiographic finding   Finding: Incidental thyroid nodule(s) for which nonemergent thyroid ultrasound is recommended     Please notify the following clinician to assist with the follow up:   Dr Rose Barlow (PCP)

## 2021-07-15 NOTE — PROGRESS NOTES
INTERNAL MEDICINE RESIDENCY SENIOR ADMISSION NOTE     Name: Gonzalez Briggs   Age & Sex: 28 y o  female   MRN: [id number]  Unit/Bed#: ED 23   Encounter: [id number]  Primary Care Provider: Roselia Schroeder DO    Admit to team: SOD Team B     Patient seen and examined  Reviewed H&P per Dr Murphy Estimable   Agree with the assessment and plan with any exception/addition as noted below:    Patient is a 28year old female with PMH of idiopathic intracranial hypertension, asthma, obesity (BMI 55 8), recently discharged on 07/12 when she was admitted for right flank pain and bilateral lower extremity swelling presents to the ED with complaints of dysphagia/odynophagia and shortness of breath since 7/13  Patient reports nausea self-induced blood streaked vomitus x1  She developed odynophagia after this episode  She admits to tolerating solid/liquid but states it is uncomfortable  She reports since her COVID-19 infection but has recently progressed  She denies cough, recent illness, fevers, chills, leg swelling, dyspnea on exertion  Patient appears very comfortable, not in any obvious distress, she claims she is short of breath but  is saturating high 90s on room air, no accessory muscle use       Plan  · GI consulted/informed recommendation appreciated  · Will keep patient NPO for likely EGD today  · Continue IV fluid hydration  · Continue Protonix 40 mg b i d    · Magic mouthwash swish swallow ordered  · Continue home medications    Principal Problem:    Odynophagia  Active Problems:    SOB (shortness of breath)    Mild intermittent asthma without complication    Idiopathic intracranial hypertension      Code Status: Level 1 - Full Code  Admission Status: OBSERVATION  Disposition: Patient requires Med/Surg  Expected Length of Stay: 1

## 2021-07-15 NOTE — H&P
INTERNAL MEDICINE RESIDENCY ADMISSION H&P     Name: Lore Khoury   Age & Sex: 28 y o  female   MRN: [id number]  Unit/Bed#: ED 23   Encounter: [id number]  Primary Care Provider: Maria Del Rosario García DO    Code Status: Level 1 - Full Code  Admission Status: INPATIENT   Disposition: Patient requires Med/Surg    Admit to team: SOD Team B     ASSESSMENT/PLAN     Principal Problem:    Odynophagia  Active Problems:    SOB (shortness of breath)    Mild intermittent asthma without complication    Idiopathic intracranial hypertension      Idiopathic intracranial hypertension  Assessment & Plan  Patient has suspected IIH, but this has not been confirmed with an LP as patient has not yet seen Neurology  Patient says that her home medications have helped her and she does not currently have a headache  · Continue home medications  · Lasix 20 mg daily  · Topamax 25 mg at night, will give her home dose today    Mild intermittent asthma without complication  Assessment & Plan  Patient has history of asthma  Currently has needed her albuterol inhaler slightly more than usual   Will continue home medications as parent patient appears comfortable and not short of breath at this time  · Albuterol 90 mcg/act inhaler p r n  · Advair inhaler b i d     SOB (shortness of breath)  Assessment & Plan  Patient has history of asthma but endorses increased shortness breath at baseline since her diagnosis with COVID-19 (tested positive in December, May)  Patient was recently seen in the ED with flank pain and shortness of breath  She was worked up for possible PE  CTA was negative (although study was limited), she was given 1 dose of Lovenox at that time, VAS lower extremity Doppler also negative  She is currently lying in bed comfortably in no acute respiratory distress  Suspect this increase in SOB is a long-term sequelae of her COVID-19 infection    · Continue to monitor  · Continue home asthma medication  · Outpatient follow-up    * Odynophagia  Assessment & Plan  Patient has new onset pain with swallowing  She was recently seen in the ED for flank pain and shortness of breath and was given 1 dose of Lovenox  After being discharged home, she had an episode of nausea and self-induced vomiting  She noticed a little bit of blood in her vomit  Since then, she has had pain with swallowing  Suspect that her pain is secondary to esophageal irritation or minor tearing secondary to vomiting  She is able to keep food and liquid down  · GI consult, appreciate recommendations  · NPO for possible scope  · IV fluids normal saline 75 cc/hour  · Magic mouthwash  · Protonix 40 mg b i d       VTE Pharmacologic Prophylaxis: Sequential compression device (Venodyne) , patient VTE score 3 and is therefore low risk  VTE Mechanical Prophylaxis: sequential compression device    CHIEF COMPLAINT     Chief Complaint   Patient presents with    Flank Pain     pt c/o reoccuring flank pain, SOB, and for a rule out blood clot  pt had elevated d-dimer on monday and discharged home on lovenox      HISTORY OF PRESENT ILLNESS     54-year-old female PMH of idiopathic intracranial hypertension, asthma, obesity (BMI 55 8) presents to the ED with painful swallowing and shortness of breath  Patient was recently seen in the ED on 7/12 for right flank pain and shortness of breath  At that time, she was worked up for PE, CTA was negative and U/a was unremarkable  She was given 1 dose of Lovenox and encouraged to follow up outpatient  She returned today with chest pain, unchanged right-sided pain, and continued shortness of breath  She says that after leaving the ED during her last visit, she had an episode of nausea and self-induced vomiting that evening  She noticed a little bit of blood in her vomit which was primarily a white mucousy consistency    The next day, when she tried to eat, she was having painful swallowing and was unable to finish her meal   She is able to keep food and liquids down, but it is painful  She did not take anything to help the pain, and is comfortable as long as she is not swallowing  She says that she has been short of breath since her last COVID-19 diagnosis  This shortness of breath is slightly worse with the new pain  She denies cough, recent illness, fevers, chills, leg swelling, dyspnea on exertion  Mild leukocytosis, elevated D-dimer 0 78, u/a, labs otherwise unremarkable  Patient is admitted for GI consultation of new onset adult odynophagia  REVIEW OF SYSTEMS     Review of Systems   Constitutional: Negative for chills and fatigue  HENT: Positive for trouble swallowing  Respiratory: Positive for shortness of breath  Cardiovascular: Positive for chest pain  Negative for palpitations and leg swelling  Gastrointestinal: Negative for blood in stool, constipation, diarrhea, nausea and vomiting  Genitourinary: Negative for dysuria  All other systems reviewed and are negative  OBJECTIVE     Vitals:    21 2035 21 2123 07/15/21 0027   BP: 166/74 155/99 145/65   BP Location: Right arm Right arm Right arm   Pulse: 85 90 79   Resp: 18  18   Temp: 97 8 °F (36 6 °C)     TempSrc: Tympanic     SpO2: 97% 97% 98%      Temperature:   Temp (24hrs), Av 8 °F (36 6 °C), Min:97 8 °F (36 6 °C), Max:97 8 °F (36 6 °C)    Temperature: 97 8 °F (36 6 °C)  Intake & Output:  I/O     None        Weights: There is no height or weight on file to calculate BMI  Weight (last 2 days)     None        Physical Exam  Constitutional:       General: She is not in acute distress  Appearance: She is obese  HENT:      Head: Normocephalic and atraumatic  Mouth/Throat:      Mouth: Mucous membranes are moist       Comments: No exudate, no thrush noted  Eyes:      Conjunctiva/sclera: Conjunctivae normal    Cardiovascular:      Rate and Rhythm: Normal rate and regular rhythm     Pulmonary:      Effort: Pulmonary effort is normal       Breath sounds: Normal breath sounds  Abdominal:      Palpations: Abdomen is soft  Musculoskeletal:      Right lower leg: No edema  Left lower leg: No edema  Comments: Tender to palpation along the sternum, right upper quadrant   Skin:     General: Skin is warm and dry  Neurological:      Mental Status: She is alert and oriented to person, place, and time  PAST MEDICAL HISTORY     Past Medical History:   Diagnosis Date    Asthma      PAST SURGICAL HISTORY     Past Surgical History:   Procedure Laterality Date     SECTION       SOCIAL & FAMILY HISTORY     Social History     Substance and Sexual Activity   Alcohol Use No     Substance and Sexual Activity   Alcohol Use No        Substance and Sexual Activity   Drug Use No     Social History     Tobacco Use   Smoking Status Never Smoker   Smokeless Tobacco Never Used     Family History   Problem Relation Age of Onset    No Known Problems Other     Arthritis Mother     Fibromyalgia Mother     Diabetes Family     Cancer Family     Heart disease Family      LABORATORY DATA     Labs: I have personally reviewed pertinent reports  Results from last 7 days   Lab Units 07/15/21  0053 07/12/21  2327   WBC Thousand/uL 9 26 10 42*   HEMOGLOBIN g/dL 11 4* 12 1   HEMATOCRIT % 38 4 39 9   PLATELETS Thousands/uL 336 350   NEUTROS PCT % 59 61   MONOS PCT % 6 6      Results from last 7 days   Lab Units 07/15/21  0053 21  2327   POTASSIUM mmol/L 3 5 3 4*   CHLORIDE mmol/L 108 106   CO2 mmol/L 26 26   BUN mg/dL 9 11   CREATININE mg/dL 0 73 0 82   CALCIUM mg/dL 8 5 9 2   ALK PHOS U/L 93 96   ALT U/L 27 24   AST U/L 16 12              Results from last 7 days   Lab Units 07/15/21  0053 21  2327   INR  0 93 0 97   PTT seconds 31 30             Micro:  No results found for: BLOODCX, URINECX, WOUNDCULT, SPUTUMCULTUR  IMAGING & DIAGNOSTIC TESTS     Imaging: I have personally reviewed pertinent reports  No results found    EKG, Pathology, and Other Studies: I have personally reviewed pertinent reports  ALLERGIES   No Known Allergies  MEDICATIONS PRIOR TO ARRIVAL     Prior to Admission medications    Medication Sig Start Date End Date Taking?  Authorizing Provider   Advair HFA 45-21 MCG/ACT inhaler INHALE 2 PUFFS 2 (TWO) TIMES A DAY RINSE MOUTH AFTER USE  3/2/21  Yes Janette Garcia DO   albuterol (2 5 mg/3 mL) 0 083 % nebulizer solution TAKE 1 VIAL BY NEBULIZATION EVERY 6 HOURS AS NEEDED FOR WHEEZING OR SHORTNESS OF BREATH 4/5/21  Yes Janette Garcia DO   albuterol (Proventil HFA) 90 mcg/act inhaler Inhale 2 puffs every 6 (six) hours as needed for wheezing 3/2/21  Yes Janette Garcia DO   ferrous sulfate 324 (65 Fe) mg Take 1 tablet (324 mg total) by mouth every other day 6/17/21  Yes Janette Garcia DO   furosemide (LASIX) 20 mg tablet Take 1 tablet (20 mg total) by mouth daily 6/17/21  Yes Janette Garcia DO   indomethacin (INDOCIN SR) 75 mg CR capsule Take 1 capsule (75 mg total) by mouth daily with breakfast 6/17/21  Yes Janette Garcia DO   Riboflavin 400 MG TABS Take 1 tablet (400 mg total) by mouth daily 4/6/21  Yes Janette aGrcia DO   topiramate (TOPAMAX) 25 mg tablet Take 1 tablet (25 mg total) by mouth daily 6/17/21  Yes Janette Garcia DO   cyclobenzaprine (FLEXERIL) 5 mg tablet Take 2 tablets (10 mg total) by mouth daily at bedtime  Patient not taking: Reported on 7/15/2021 4/6/21   Janette Garcia DO   metFORMIN (GLUCOPHAGE-XR) 500 mg 24 hr tablet TAKE 1 TABLET BY MOUTH EVERY DAY WITH DINNER  Patient not taking: Reported on 4/6/2021 2/9/21 7/15/21  Janette Garcia DO     MEDICATIONS ADMINISTERED IN LAST 24 HOURS     Medication Administration - last 24 hours from 07/14/2021 0216 to 07/15/2021 0216       Date/Time Order Dose Route Action Action by     07/14/2021 3869 lidocaine (LIDODERM) 5 % patch 1 patch 1 patch Topical Medication Applied Erin Williamson     07/14/2021 6873 ketorolac (TORADOL) injection 15 mg 15 mg Intramuscular Given Erin Williamson     07/15/2021 0211 multi-electrolyte (PLASMALYTE-A/ISOLYTE-S PH 7 4) IV solution 125 mL/hr Intravenous Not Given Dagoberto Escobedo RN        CURRENT MEDICATIONS     Current Facility-Administered Medications   Medication Dose Route Frequency Provider Last Rate    al mag oxide-diphenhydramine-lidocaine viscous  10 mL Swish & Swallow Once Elidia Gomez MD      albuterol  2 puff Inhalation Q6H PRN Elidia Gomez MD      fluticasone-salmeterol  1 puff Inhalation Q12H Zack Doty MD      furosemide  20 mg Oral Daily Elidia Gomez MD      lidocaine  1 patch Topical Once Sarah Zavala MD      pantoprazole  40 mg Oral BID AC Elidia Gomez MD      pantoprazole  40 mg Intravenous Once Sarah Zavala MD      sodium chloride  75 mL/hr Intravenous Continuous Elidia Gomez MD      topiramate  25 mg Oral Daily Elidia Gomez MD       sodium chloride, 75 mL/hr      albuterol, 2 puff, Q6H PRN        Admission Time  I spent 45 minutes admitting the patient  This involved direct patient contact where I performed a full history and physical, reviewing previous records, and reviewing laboratory and other diagnostic studies  Portions of the record may have been created with voice recognition software  Occasional wrong word or "sound a like" substitutions may have occurred due to the inherent limitations of voice recognition software    Read the chart carefully and recognize, using context, where substitutions have occurred     ==  Elidia Gomez MD  [address]  Internal Medicine Residency PGY-1

## 2021-07-15 NOTE — ASSESSMENT & PLAN NOTE
Patient has history of asthma but endorses increased shortness breath at baseline since her diagnosis with COVID-19 (tested positive in December, May)  Patient was recently seen in the ED with flank pain and shortness of breath  She was worked up for possible PE  CTA was negative (although study was limited), she was given 1 dose of Lovenox at that time, VAS lower extremity Doppler also negative  She is currently lying in bed comfortably in no acute respiratory distress  Suspect this increase in SOB is a long-term sequelae of her COVID-19 infection    · Continue to monitor  · Continue home asthma medication  · Outpatient follow-up  · Stable on discharge

## 2021-07-15 NOTE — CONSULTS
Consultation -  [facility]  Al Argue 28 y o  female MRN: [id number]  Unit/Bed#: ODETTE Encounter: [id number]        Inpatient consult to gastroenterology  Consult performed by: Quinn Dooley  Consult ordered by: Nilesh Valentino MD          Reason for Consult / Principal Problem:     Pt has developed new onset  odynophagia      ASSESSMENT AND PLAN:    Odynophagia with Dysphagia:  Pt is a 30 y/o female with a pmhx of asthma, pseudotumor cerebri and anemia who presents with odynophagia and dysphagia  Given the patient's ability to initiate swallowing with the sensation of food getting stuck, It was determined that the patient has esophageal dysphagia  It was then decided to perform an EGD to evaluate for structural vs functional causes dysphagia  EGD with biopsies was performed 7/16/21  EGD identified moderate esophagitis without any strictures, rings, webs or other structural abnormalities  Given the patients symptoms and long standing indomethacin use It is likely that it is functional dysphagia secondary to pain from GERD esophagitis  Results of biopsies are still pending  Plan:  1  From GI standpoint, pt is stable and can be discharged  2  Take indomethacin with food and avoid other NSAIDs  3  Start PO PPI therapy   4  Start Carafate   5  Follow up with GI outpatient   6  If symptoms do not improve, schedule out patient further motility work up such as manometry  ______________________________________________________________________    HPI: Pt is a 29year old female with a pmhx of asthma, pseudotumor cerebri and anemia   Pt has a 2 day Hx of odynphagia with dysphagia  Pt is able to initiate swallowing but feels as if food gets stuck in her throat  She is unable to tolerate solid food, but is able to tolerate liquids if she drinks slowly  She denies having any previous episodes of dysphagia  On 7/13, the patient experienced an episode of nausea and self induced vomiting   The vomit was described as mucinous and blood tinged  Pt also endorses episodes of belching and bloating  Pt endorses 8 soft brown bowel movements yesterday  On physical exam, Abddomen soft and not distended  Bowel sounds are normoactive  Abdomen is dull to percussion and is non tender to palpation    REVIEW OF SYSTEMS:    CONSTITUTIONAL: Denies any fever, chills, rigors, and weight loss  HEENT: No earache or tinnitus  Denies hearing loss or visual disturbances  CARDIOVASCULAR: No chest pain or palpitations  RESPIRATORY: Denies any cough, hemoptysis, shortness of breath or dyspnea on exertion  GASTROINTESTINAL: As noted in the History of Present Illness  GENITOURINARY: No problems with urination  Denies any hematuria or dysuria  NEUROLOGIC: No dizziness or vertigo, denies headaches  MUSCULOSKELETAL: Right sided flank pain  SKIN: Denies skin rashes or itching  ENDOCRINE: Denies excessive thirst  Denies intolerance to heat or cold  PSYCHOSOCIAL: Denies depression or anxiety  Denies any recent memory loss         Historical Information   Past Medical History:   Diagnosis Date    Asthma      Past Surgical History:   Procedure Laterality Date     SECTION       Social History   Social History     Substance and Sexual Activity   Alcohol Use No     Social History     Substance and Sexual Activity   Drug Use No     Social History     Tobacco Use   Smoking Status Never Smoker   Smokeless Tobacco Never Used     Family History   Problem Relation Age of Onset    No Known Problems Other     Arthritis Mother     Fibromyalgia Mother     Diabetes Family     Cancer Family     Heart disease Family        Meds/Allergies     (Not in a hospital admission)    Current Facility-Administered Medications   Medication Dose Route Frequency    albuterol (PROVENTIL HFA,VENTOLIN HFA) inhaler 2 puff  2 puff Inhalation Q6H PRN    dextrose 5 % and sodium chloride 0 9 % infusion  75 mL/hr Intravenous Continuous    fluticasone-vilanterol (BREO ELLIPTA) 100-25 mcg/inh inhaler 1 puff  1 puff Inhalation Daily    furosemide (LASIX) tablet 20 mg  20 mg Oral Daily    pantoprazole (PROTONIX) EC tablet 40 mg  40 mg Oral BID AC    potassium chloride 20 mEq IVPB (premix)  20 mEq Intravenous TID    topiramate (TOPAMAX) tablet 25 mg  25 mg Oral Daily       No Known Allergies        Objective     Blood pressure 130/73, pulse 73, temperature 98 6 °F (37 °C), temperature source Tympanic, resp  rate 16, SpO2 96 %, not currently breastfeeding  There is no height or weight on file to calculate BMI  No intake or output data in the 24 hours ending 07/15/21 1422      PHYSICAL EXAM:      General Appearance:   Alert, cooperative, no distress   HEENT:   Normocephalic, atraumatic, anicteric  Neck:  Supple, symmetrical, trachea midline   Lungs:   Clear to auscultation bilaterally; no rales, rhonchi or wheezing; respirations unlabored    Heart[de-identified]   Regular rate and rhythm; no murmur, rub, or gallop     Abdomen:   Soft, non-tender, non-distended; normal bowel sounds; no masses, no organomegaly    Genitalia:   Deferred    Rectal:   Deferred    Extremities:  No cyanosis, clubbing or edema    Pulses:  2+ and symmetric all extremities    Skin:  No jaundice, rashes, or lesions    Lymph nodes:  No palpable cervical lymphadenopathy        Lab Results:   Admission on 07/14/2021   Component Date Value    WBC 07/15/2021 9 26     RBC 07/15/2021 4 71     Hemoglobin 07/15/2021 11 4*    Hematocrit 07/15/2021 38 4     MCV 07/15/2021 82     MCH 07/15/2021 24 2*    MCHC 07/15/2021 29 7*    RDW 07/15/2021 16 9*    MPV 07/15/2021 10 1     Platelets 45/83/5898 336     nRBC 07/15/2021 0     Neutrophils Relative 07/15/2021 59     Immat GRANS % 07/15/2021 0     Lymphocytes Relative 07/15/2021 27     Monocytes Relative 07/15/2021 6     Eosinophils Relative 07/15/2021 7*    Basophils Relative 07/15/2021 1     Neutrophils Absolute 07/15/2021 5 56     Immature Grans Absolute 07/15/2021 0 02     Lymphocytes Absolute 07/15/2021 2 47     Monocytes Absolute 07/15/2021 0 51     Eosinophils Absolute 07/15/2021 0 65*    Basophils Absolute 07/15/2021 0 05     Protime 07/15/2021 12 5     INR 07/15/2021 0 93     PTT 07/15/2021 31     Sodium 07/15/2021 139     Potassium 07/15/2021 3 5     Chloride 07/15/2021 108     CO2 07/15/2021 26     ANION GAP 07/15/2021 5     BUN 07/15/2021 9     Creatinine 07/15/2021 0 73     Glucose 07/15/2021 98     Calcium 07/15/2021 8 5     Corrected Calcium 07/15/2021 9 2     AST 07/15/2021 16     ALT 07/15/2021 27     Alkaline Phosphatase 07/15/2021 93     Total Protein 07/15/2021 7 9     Albumin 07/15/2021 3 1*    Total Bilirubin 07/15/2021 <0 10*    eGFR 07/15/2021 107     Sodium 07/15/2021 141     Potassium 07/15/2021 3 1*    Chloride 07/15/2021 107     CO2 07/15/2021 27     ANION GAP 07/15/2021 7     BUN 07/15/2021 7     Creatinine 07/15/2021 0 72     Glucose 07/15/2021 55*    Calcium 07/15/2021 8 9     eGFR 07/15/2021 109     WBC 07/15/2021 8 85     RBC 07/15/2021 4 96     Hemoglobin 07/15/2021 12 0     Hematocrit 07/15/2021 41 6     MCV 07/15/2021 84     MCH 07/15/2021 24 2*    MCHC 07/15/2021 28 8*    RDW 07/15/2021 17 3*    MPV 07/15/2021 10 5     Platelets 18/21/7302 342     nRBC 07/15/2021 0     Neutrophils Relative 07/15/2021 60     Immat GRANS % 07/15/2021 0     Lymphocytes Relative 07/15/2021 26     Monocytes Relative 07/15/2021 6     Eosinophils Relative 07/15/2021 7*    Basophils Relative 07/15/2021 1     Neutrophils Absolute 07/15/2021 5 27     Immature Grans Absolute 07/15/2021 0 02     Lymphocytes Absolute 07/15/2021 2 32     Monocytes Absolute 07/15/2021 0 55     Eosinophils Absolute 07/15/2021 0 64*    Basophils Absolute 07/15/2021 0 05     Magnesium 07/15/2021 2 0     Phosphorus 07/15/2021 3 3     Ventricular Rate 07/15/2021 71     Atrial Rate 07/15/2021 71     OK Interval 07/15/2021 158     QRSD Interval 07/15/2021 90     QT Interval 07/15/2021 400     QTC Interval 07/15/2021 434     P Axis 07/15/2021 50     QRS Axis 07/15/2021 38     T Wave Axis 07/15/2021 47     POC Glucose 07/15/2021 99        Imaging Studies: I have personally reviewed pertinent imaging studies

## 2021-07-15 NOTE — ASSESSMENT & PLAN NOTE
Patient has new onset pain with swallowing  She was recently seen in the ED for flank pain and shortness of breath and was given 1 dose of Lovenox  After being discharged home, she had an episode of nausea and self-induced vomiting  She noticed a little bit of blood in her vomit  Since then, she has had pain with swallowing  Suspect that her pain is secondary to esophageal irritation or minor tearing secondary to vomiting  She is able to keep food and liquid down  Underwent EGD 7/15, suggestive of mild esophagitis  No other concerning findings  Biopsies taken  Recommended PPI Protonix 40 mg daily along with Carafate    Outpatient GI follow-up symptoms persist

## 2021-07-15 NOTE — ANESTHESIA PREPROCEDURE EVALUATION
Procedure:  EGD    Relevant Problems   PULMONARY   (+) Acute asthma exacerbation   (+) Mild intermittent asthma without complication   (+) SOB (shortness of breath)      Other   (+) COVID-19   (+) Idiopathic intracranial hypertension   (+) Morbid obesity due to excess calories ([facility] [address] )   (+) Odynophagia   COVID -12/20          Anesthesia Plan  ASA Score- 3     Anesthesia Type- IV sedation with anesthesia with ASA Monitors  Additional Monitors:   Airway Plan:           Plan Factors-Exercise tolerance (METS): >4 METS  Patient is not a current smoker  Patient not instructed to abstain from smoking on day of procedure  Patient did not smoke on day of surgery  Induction- intravenous  Postoperative Plan-     Informed Consent- Anesthetic plan and risks discussed with patient and mother  I personally reviewed this patient with the CRNA  Discussed and agreed on the Anesthesia Plan with the CRNA               Lab Results   Component Value Date    GLUC 55 (L) 07/15/2021    ALT 27 07/15/2021    AST 16 07/15/2021    BUN 7 07/15/2021    CALCIUM 8 9 07/15/2021     07/15/2021    CO2 27 07/15/2021    CREATININE 0 72 07/15/2021    INR 0 93 07/15/2021    HCT 41 6 07/15/2021    HGB 12 0 07/15/2021    HGBA1C 5 9 10/23/2017    MG 2 0 07/15/2021    PHOS 3 3 07/15/2021     07/15/2021    K 3 1 (L) 07/15/2021     05/14/2015    WBC 8 85 07/15/2021

## 2021-07-15 NOTE — PROGRESS NOTES
INTERNAL MEDICINE RESIDENCY PROGRESS NOTE     Name: Judy Short   Age & Sex: 28 y o  female   MRN: [id number]  Unit/Bed#: ED 23   Encounter: [id number]  Team: SOD Team B     PATIENT INFORMATION     Name: Judy Short   Age & Sex: 28 y o  female   MRN: [id number]  Hospital Stay Days: 0    ASSESSMENT/PLAN     Principal Problem:    Odynophagia  Active Problems:    SOB (shortness of breath)    Mild intermittent asthma without complication    Idiopathic intracranial hypertension    Right flank pain      * Odynophagia  Assessment & Plan  Patient has new onset pain with swallowing  She was recently seen in the ED for flank pain and shortness of breath and was given 1 dose of Lovenox  After being discharged home, she had an episode of nausea and self-induced vomiting  She noticed a little bit of blood in her vomit  Since then, she has had pain with swallowing  Suspect that her pain is secondary to esophageal irritation or minor tearing secondary to vomiting  She is able to keep food and liquid down  · GI consult, appreciate recommendations  · NPO for possible scope  · IV fluids normal saline 75 cc/hour  · Magic mouthwash  · Protonix 40 mg b i d     Idiopathic intracranial hypertension  Assessment & Plan  Patient has suspected IIH, but this has not been confirmed with an LP as patient has not yet seen Neurology  Patient says that her home medications have helped her and she does not currently have a headache  · Continue home medications  · Lasix 20 mg daily  · Topamax 25 mg at night, will give her home dose today    Mild intermittent asthma without complication  Assessment & Plan  Patient has history of asthma  Currently has needed her albuterol inhaler slightly more than usual   Will continue home medications as parent patient appears comfortable and not short of breath at this time  · Albuterol 90 mcg/act inhaler p r n    · Advair inhaler b i d     SOB (shortness of breath)  Assessment & Plan  Patient has history of asthma but endorses increased shortness breath at baseline since her diagnosis with COVID-19 (tested positive in December, May)  Patient was recently seen in the ED with flank pain and shortness of breath  She was worked up for possible PE  CTA was negative (although study was limited), she was given 1 dose of Lovenox at that time, VAS lower extremity Doppler also negative  She is currently lying in bed comfortably in no acute respiratory distress  Suspect this increase in SOB is a long-term sequelae of her COVID-19 infection  · Continue to monitor  · Continue home asthma medication  · Outpatient follow-up      Disposition: Patient will be seen by GI for possible scope  CT to assess cause of right flank pain  Continue to monitor  SUBJECTIVE     Patient seen and examined  She reports pain on swallowing, SOB at rest, right flank pain of 7/10 intensity with lidocaine patch, leg swelling, five episodes of diarrhea yesterday  She denies cough, heart burn, fever, chills, weight changes  No acute events overnight  OBJECTIVE     Vitals:    07/15/21 0228 07/15/21 0230 07/15/21 0517 07/15/21 0800   BP: 133/67 133/67 139/60 116/64   BP Location: Right arm Right arm Left arm    Pulse: 74 78 78 72   Resp: 16 14 16 18   Temp:       TempSrc:       SpO2: 98% 98% 98% 98%      Temperature:   Temp (24hrs), Av 8 °F (36 6 °C), Min:97 8 °F (36 6 °C), Max:97 8 °F (36 6 °C)    Temperature: 97 8 °F (36 6 °C)  Intake & Output:  I/O     None        Weights: There is no height or weight on file to calculate BMI  Weight (last 2 days)     None        Physical Exam  Vitals reviewed  Constitutional:       General: She is not in acute distress  Appearance: Normal appearance  She is obese  She is not ill-appearing, toxic-appearing or diaphoretic  HENT:      Head: Normocephalic and atraumatic  Cardiovascular:      Rate and Rhythm: Normal rate and regular rhythm  Pulses: Normal pulses  Carotid pulses are 2+ on the right side and 2+ on the left side  Posterior tibial pulses are 2+ on the right side and 2+ on the left side  Heart sounds: Normal heart sounds  No murmur heard  Pulmonary:      Effort: Pulmonary effort is normal       Breath sounds: Decreased breath sounds (possibly due to body habitus) present  Abdominal:      General: Bowel sounds are normal       Palpations: Abdomen is soft  Tenderness: There is abdominal tenderness (RUQ tenderness to light palpation)  There is no guarding  Musculoskeletal:         General: Tenderness (tenderness to palpation on right costochondrial junction, right flank) present  Right lower leg: Pitting Edema present  Left lower leg: Pitting Edema present  Skin:     General: Skin is warm  Neurological:      General: No focal deficit present  Mental Status: She is alert and oriented to person, place, and time  Psychiatric:         Mood and Affect: Mood normal          Behavior: Behavior normal          Thought Content: Thought content normal        LABORATORY DATA     Labs: I have personally reviewed pertinent reports    Results from last 7 days   Lab Units 07/15/21  0517 07/15/21  0053 07/12/21  2327   WBC Thousand/uL 8 85 9 26 10 42*   HEMOGLOBIN g/dL 12 0 11 4* 12 1   HEMATOCRIT % 41 6 38 4 39 9   PLATELETS Thousands/uL 342 336 350   NEUTROS PCT % 60 59 61   MONOS PCT % 6 6 6      Results from last 7 days   Lab Units 07/15/21  0517 07/15/21  0053 07/12/21  2327   POTASSIUM mmol/L 3 1* 3 5 3 4*   CHLORIDE mmol/L 107 108 106   CO2 mmol/L 27 26 26   BUN mg/dL 7 9 11   CREATININE mg/dL 0 72 0 73 0 82   CALCIUM mg/dL 8 9 8 5 9 2   ALK PHOS U/L  --  93 96   ALT U/L  --  27 24   AST U/L  --  16 12     Results from last 7 days   Lab Units 07/15/21  0053   MAGNESIUM mg/dL 2 0     Results from last 7 days   Lab Units 07/15/21  0053   PHOSPHORUS mg/dL 3 3      Results from last 7 days   Lab Units 07/15/21  0053 07/12/21  2327   INR  0 93 0 97   PTT seconds 31 30               IMAGING & DIAGNOSTIC TESTING     Radiology Results: I have personally reviewed pertinent reports  No results found  Other Diagnostic Testing: I have personally reviewed pertinent reports  ACTIVE MEDICATIONS     Current Facility-Administered Medications   Medication Dose Route Frequency    albuterol (PROVENTIL HFA,VENTOLIN HFA) inhaler 2 puff  2 puff Inhalation Q6H PRN    dextrose 5 % and sodium chloride 0 9 % infusion  75 mL/hr Intravenous Continuous    fluticasone-vilanterol (BREO ELLIPTA) 100-25 mcg/inh inhaler 1 puff  1 puff Inhalation Daily    furosemide (LASIX) tablet 20 mg  20 mg Oral Daily    pantoprazole (PROTONIX) EC tablet 40 mg  40 mg Oral BID AC    potassium chloride 20 mEq IVPB (premix)  20 mEq Intravenous TID    topiramate (TOPAMAX) tablet 25 mg  25 mg Oral Daily       VTE Pharmacologic Prophylaxis: {Pharmacologic VTE Prophylaxis:436074319}  VTE Mechanical Prophylaxis: {Mechanical VTE Prophylaxis:37413}    Portions of the record may have been created with voice recognition software  Occasional wrong word or "sound a like" substitutions may have occurred due to the inherent limitations of voice recognition software    Read the chart carefully and recognize, using context, where substitutions have occurred   ==  Belkis Robles, [facility]  Internal Medicine

## 2021-07-15 NOTE — ASSESSMENT & PLAN NOTE
Patient has new onset pain with swallowing  She was recently seen in the ED for flank pain and shortness of breath and was given 1 dose of Lovenox  After being discharged home, she had an episode of nausea and self-induced vomiting  She noticed a little bit of blood in her vomit  Since then, she has had pain with swallowing  Suspect that her pain is secondary to esophageal irritation or minor tearing secondary to vomiting  She is able to keep food and liquid down  · GI consult, appreciate recommendations  · NPO for possible scope  · IV fluids normal saline 75 cc/hour  · Magic mouthwash  · Protonix 40 mg b i d

## 2021-07-15 NOTE — ED ATTENDING ATTESTATION
Andrew Purvis DO, saw and evaluated the patient  I have discussed the patient with the resident/non-physician practitioner and agree with the resident's/non-physician practitioner's findings, Plan of Care, and MDM as documented in the resident's/non-physician practitioner's note, except where noted  All available labs and Radiology studies were reviewed  At this point I agree with the current assessment done in the Emergency Department  I have conducted an independent evaluation of this patient including a focused history and a physical exam     ED Note - Cindy Burton 28 y o  female MRN: [id number]  Unit/Bed#: ED 23 Encounter: [id number]    History of Present Illness   HPI  Zunilda Lyons is a 28 y o  female who presents for evaluation of concern for left lower extremity DVT, persistent right flank pain and also reports odynophagia  Patient was evaluated approximately 24 hours ago and had a CT PE scan which did not show any central large PE but was inconclusive for distal P  Patient was given Lovenox and a prescription to follow-up for a duplex ultrasound  Patient complains of mild pain in the left calf region  No shortness of breath or pleuritic-type chest pain  Patient denies any overt chest pain, shortness of breath  Patient also complaining of persistent right flank pain and there is noted tenderness to the intercostal muscles and soft tissues  No obvious lesions  The pain has been present for a few months  Also concern, the patient reports feeling nauseated yesterday and states that she forced herself to throw up  She denies having a forceful vomitus but shortly thereafter had regurgitation of blood-tinged phlegm  Patient states that she tried to eat shortly thereafter and was unable to tolerate solid intake seen in she was having discomfort with a sensation of fullness  Patient was able to tolerate liquid intake    Patient does states she has a fullness sensation currently  REVIEW OF SYSTEMS  See HPI for further details  12 systems reviewed and otherwise negative except as noted  Historical Information     PAST MEDICAL HISTORY  Past Medical History:   Diagnosis Date    Asthma        FAMILY HISTORY  Family History   Problem Relation Age of Onset    No Known Problems Other     Arthritis Mother     Fibromyalgia Mother     Diabetes Family     Cancer Family     Heart disease Family        SOCIAL HISTORY  Social History     Socioeconomic History    Marital status: /Civil Union     Spouse name: None    Number of children: None    Years of education: None    Highest education level: None   Occupational History    None   Tobacco Use    Smoking status: Never Smoker    Smokeless tobacco: Never Used   Vaping Use    Vaping Use: Never used   Substance and Sexual Activity    Alcohol use: No    Drug use: No    Sexual activity: Yes   Other Topics Concern    None   Social History Narrative    Social problem      Social Determinants of Health     Financial Resource Strain:     Difficulty of Paying Living Expenses:    Food Insecurity:     Worried About Running Out of Food in the Last Year:     Ran Out of Food in the Last Year:    Transportation Needs:     Lack of Transportation (Medical):      Lack of Transportation (Non-Medical):    Physical Activity:     Days of Exercise per Week:     Minutes of Exercise per Session:    Stress:     Feeling of Stress :    Social Connections:     Frequency of Communication with Friends and Family:     Frequency of Social Gatherings with Friends and Family:     Attends Alevism Services:     Active Member of Clubs or Organizations:     Attends Club or Organization Meetings:     Marital Status:    Intimate Partner Violence:     Fear of Current or Ex-Partner:     Emotionally Abused:     Physically Abused:     Sexually Abused:        SURGICAL HISTORY  Past Surgical History:   Procedure Laterality Date     SECTION       Meds/Allergies     CURRENT MEDICATIONS    Current Facility-Administered Medications:     ketorolac (TORADOL) injection 15 mg, 15 mg, Intramuscular, Once, Adela Serna MD    lidocaine (LIDODERM) 5 % patch 1 patch, 1 patch, Topical, Once, Adela Serna MD    Current Outpatient Medications:     Advair HFA 45-21 MCG/ACT inhaler, INHALE 2 PUFFS 2 (TWO) TIMES A DAY RINSE MOUTH AFTER USE , Disp: 24 Inhaler, Rfl: 3    albuterol (2 5 mg/3 mL) 0 083 % nebulizer solution, TAKE 1 VIAL BY NEBULIZATION EVERY 6 HOURS AS NEEDED FOR WHEEZING OR SHORTNESS OF BREATH, Disp: 75 mL, Rfl: 1    albuterol (Proventil HFA) 90 mcg/act inhaler, Inhale 2 puffs every 6 (six) hours as needed for wheezing, Disp: 6 7 g, Rfl: 3    cyclobenzaprine (FLEXERIL) 5 mg tablet, Take 2 tablets (10 mg total) by mouth daily at bedtime, Disp: 30 tablet, Rfl: 0    ferrous sulfate 324 (65 Fe) mg, Take 1 tablet (324 mg total) by mouth every other day, Disp: 30 tablet, Rfl: 3    furosemide (LASIX) 20 mg tablet, Take 1 tablet (20 mg total) by mouth daily, Disp: 30 tablet, Rfl: 2    indomethacin (INDOCIN SR) 75 mg CR capsule, Take 1 capsule (75 mg total) by mouth daily with breakfast, Disp: 30 capsule, Rfl: 1    metFORMIN (GLUCOPHAGE-XR) 500 mg 24 hr tablet, TAKE 1 TABLET BY MOUTH EVERY DAY WITH DINNER (Patient not taking: Reported on 4/6/2021), Disp: 30 tablet, Rfl: 1    Riboflavin 400 MG TABS, Take 1 tablet (400 mg total) by mouth daily, Disp: 30 tablet, Rfl: 0    topiramate (TOPAMAX) 25 mg tablet, Take 1 tablet (25 mg total) by mouth daily, Disp: 30 tablet, Rfl: 1  (Not in a hospital admission)      ALLERGIES  No Known Allergies  Objective     PHYSICAL EXAM    VITAL SIGNS: Blood pressure 155/99, pulse 90, temperature 97 8 °F (36 6 °C), temperature source Tympanic, resp  rate 18, SpO2 97 %, not currently breastfeeding      Constitutional:  Appears well developed and well nourished, no acute distress, non-toxic appearance Eyes:  PERRL, EOMI, conjunctivae pink, sclerae non-icteric    HENT:  Normocephalic/Atraumatic, no rhinorrhea, mucous membranes moist   Neck: normal range of motion, no tenderness, supple   Respiratory:  No respiratory distress, normal breath sounds   Cardiovascular:  Normal rate, normal rhythm    GI:  Soft, no tenderness, non-distended  :  No CVAT, no flank ecchymosis  Musculoskeletal:  Tenderness over the right intercostal muscles between ribs 5 to 10, no crepitus  Remainder of exam:  No swelling or edema, no tenderness, no deformities  Integument:  No subcutaneous emphysema  Pink, warm, dry, Well hydrated, no rash, no erythema, no bullae   Lymphatic:  No cervical/ tonsillar/ submandibular lymphadenopathy noted   Neurologic:  Awake, Alert & oriented x 3, CN 2-12 intact, no focal neurological deficits, motor function intact  Psychiatric:  Speech and behavior appropriate       ED COURSE and MDM:    Assessment/Plan   Assessment:  Jamaica Suero is a 28 y o  female presents for evaluation of rule out DVT, chest discomfort and odynophagia after vomiting  Plan:  Labs, EKG, CXR, Symptom management  Disposition as appropriate  CRITICAL CARE TIME:   0      Portions of the record may have been created with voice recognition software  Occasional wrong word or "sound a like" substitutions may have occurred due to the inherent limitations of voice recognition software       ED Provider  Electronically Signed by

## 2021-07-15 NOTE — DISCHARGE INSTRUCTIONS
Please follow-up with primary care doctor in about 1 week and discuss the events of the hospitalization  Please take Protonix 40 mg, 1 tablet daily before breakfast, Carafate 1 g with meals  Please contact Gastroenterology office to set up an appointment if the symptoms continue  Avoid using any over-the-counter pain medications including NSAIDs like Aleve, Motrin, etc     Upper Endoscopy   WHAT YOU NEED TO KNOW:   An upper endoscopy is also called an upper gastrointestinal (GI) endoscopy, or an esophagogastroduodenoscopy (EGD)  It is a procedure to examine the inside of your esophagus, stomach, and duodenum (first part of the small intestine) with a scope  You may feel bloated, gassy, or have some abdominal discomfort after your procedure  Your throat may be sore for 24 to 36 hours  You may burp or pass gas from air that is still inside your body  DISCHARGE INSTRUCTIONS:   Seek care immediately if:    You have sudden, severe abdominal pain   You have problems swallowing   You have a large amount of black, sticky bowel movements or blood in your bowel movements   You have sudden trouble breathing   You feel weak, lightheaded, or faint or your heart beats faster than normal for you  Contact your healthcare provider if:    You have a fever and chills   You have nausea or are vomiting   Your abdomen is bloated or feels full and hard   You have abdominal pain   You have black, sticky bowel movements or blood in your bowel movements   You have not had a bowel movement for 3 days after your procedure   You have rash or hives   You have questions or concerns about your procedure  Activity:    Do not lift, strain, or run for 24 hours after your procedure   Rest after your procedure  You have been given medicine to relax you  Do not drive or make important decisions until the day after your procedure  Return to your normal activity as directed   Relieve gas and discomfort from bloating by lying on your right side with a heating pad on your abdomen  You may need to take short walks to help the gas move out  Eat small meals until bloating is relieved  Follow up with your healthcare provider as directed: Write down your questions so you remember to ask them during your visits  If you take a blood thinner, please review the specific instructions from your endoscopist about when you should resume it  These can be found in the Recommendation and Your Medication list sections of this After Visit Summary

## 2021-07-16 NOTE — UTILIZATION REVIEW
Initial Clinical Review    Admission: Date/Time/Statement:   Admission Orders (From admission, onward)     Ordered        07/15/21 0137  Inpatient Admission  Once                   Orders Placed This Encounter   Procedures    Inpatient Admission     Standing Status:   Standing     Number of Occurrences:   1     Order Specific Question:   Level of Care     Answer:   Med Surg [16]     Order Specific Question:   Estimated length of stay     Answer:   More than 2 Midnights     Order Specific Question:   Certification     Answer:   I certify that inpatient services are medically necessary for this patient for a duration of greater than two midnights  See H&P and MD Progress Notes for additional information about the patient's course of treatment  ED Arrival Information     Expected Arrival Acuity    - 7/14/2021 20:23 Urgent         Means of arrival Escorted by Service Admission type    Walk-In Self General Medicine Urgent         Arrival complaint    Flank pain        Chief Complaint   Patient presents with    Flank Pain     pt c/o reoccuring flank pain, SOB, and for a rule out blood clot  pt had elevated d-dimer on monday and discharged home on lovenox       Initial Presentation: 27 yo f with a pmh of idiopathic intracranial hypertension, asthma,obesity ( BMI 55 8), She presents to the Ed with complaint of SOB and painful swallowing, chest pain and unchanged right side pain  She was seen in the ED recently on 7/12 for R flank pain and SOB  She reports she had an episode of nausea and self - induced emesis that evening  The next day , when she tried to eat she had painful swallowing and was unable to finish her meal  She is able to keep food and liquids down but continues with pain  She reports that she has been SOB since her COVID - 19 diagnosis, she reports it is slightly worse with the new pain    She is admitted inpatient  for GI evaluation due to new onset adult odynophagia     Gastroenterology - 7/15 - 51-year-old lady with symptoms of odynophagia and dysphagia which started over the past few days  She has had a long history of dyspepsia/GERD  I believe a lot of her symptoms we secondary to her GERD and the odynophagia/dysphagia be secondary to esophagitis or possible even a peptic stricture since she had dysphagia to solids  However it is less likely since her symptoms were very acute  At this time for better evaluation I would recommend to start with an EGD  Of note is that she has been taking indomethacin over the past several months  This could also lead to worsening of her reflux symptoms  Recommend PPI therapy which should be continued as an outpatient as well  Recommend Carafate  EGD 7/15 -  Impression mild esophagitis, Biopsies taken for the esophagus, stomach and duodenum   recommend  PPI once daily, Carafate, avoid NSAIDS,       ED Triage Vitals   Temperature Pulse Respirations Blood Pressure SpO2   07/14/21 2035 07/14/21 2035 07/14/21 2035 07/14/21 2035 07/14/21 2035   97 8 °F (36 6 °C) 85 18 166/74 97 %      Temp Source Heart Rate Source Patient Position - Orthostatic VS BP Location FiO2 (%)   07/14/21 2035 07/14/21 2035 07/14/21 2035 07/14/21 2035 --   Tympanic Monitor Lying Right arm       Pain Score       07/14/21 2123       9          Wt Readings from Last 1 Encounters:   06/17/21 (!) 143 kg (315 lb)     Additional Vital Signs:   Date/Time  Temp  Pulse  Resp  BP  MAP (mmHg)  SpO2  O2 Device  Patient Position - Orthostatic VS   07/15/21 1338  --  73  16  130/73  --  96 %  None (Room air)  --   07/15/21 1323  98 6 °F (37 °C)  79  17  120/70  --  99 %  None (Room air)  --   07/15/21 1154  98 9 °F (37 2 °C)  86  16  164/95  --  96 %  None (Room air)  --   07/15/21 0800  --  72  18  116/64  85  98 %  --  --   07/15/21 0517  --  78  16  139/60  --  98 %  None (Room air)  Lying   07/15/21 0230  --  78  14  133/67  91  98 %  None (Room air)  Lying   07/15/21 0228  --  74  16  133/67  --  98 % None (Room air)  Lying   07/15/21 0027  --  79  18  145/65  --  98 %  None (Room air)  Lying   07/14/21 2123  --  90  --  155/99  --  97 %  None (Room air)  Sitting             Pertinent Labs/Diagnostic Test Results:       Results from last 7 days   Lab Units 07/15/21  0517 07/15/21  0053 07/12/21  2327   WBC Thousand/uL 8 85 9 26 10 42*   HEMOGLOBIN g/dL 12 0 11 4* 12 1   HEMATOCRIT % 41 6 38 4 39 9   PLATELETS Thousands/uL 342 336 350   NEUTROS ABS Thousands/µL 5 27 5 56 6 20         Results from last 7 days   Lab Units 07/15/21  0517 07/15/21  0053 07/12/21  2327   SODIUM mmol/L 141 139 139   POTASSIUM mmol/L 3 1* 3 5 3 4*   CHLORIDE mmol/L 107 108 106   CO2 mmol/L 27 26 26   ANION GAP mmol/L 7 5 7   BUN mg/dL 7 9 11   CREATININE mg/dL 0 72 0 73 0 82   EGFR ml/min/1 73sq m 109 107 93   CALCIUM mg/dL 8 9 8 5 9 2   MAGNESIUM mg/dL  --  2 0  --    PHOSPHORUS mg/dL  --  3 3  --      Results from last 7 days   Lab Units 07/15/21  0053 07/12/21  2327   AST U/L 16 12   ALT U/L 27 24   ALK PHOS U/L 93 96   TOTAL PROTEIN g/dL 7 9 8 0   ALBUMIN g/dL 3 1* 3 1*   TOTAL BILIRUBIN mg/dL <0 10* 0 22     Results from last 7 days   Lab Units 07/15/21  1353   POC GLUCOSE mg/dl 99     Results from last 7 days   Lab Units 07/15/21  0517 07/15/21  0053 07/12/21  2327   GLUCOSE RANDOM mg/dL 55* 98 121       Results from last 7 days   Lab Units 07/12/21  2327   D-DIMER QUANTITATIVE ug/ml FEU 0 78*     Results from last 7 days   Lab Units 07/15/21  0053 07/12/21  2327   PROTIME seconds 12 5 12 9   INR  0 93 0 97   PTT seconds 31 30       Results from last 7 days   Lab Units 07/13/21  0038 07/13/21  0036   CLARITY UA  Clear Clear   COLOR UA  Yellow Yellow   SPEC GRAV UA   --  >=1 030   PH UA   --  5 5   GLUCOSE UA mg/dl  --  Negative   KETONES UA mg/dl  --  Negative   BLOOD UA   --  Negative   PROTEIN UA mg/dl  --  Negative   NITRITE UA   --  Negative   BILIRUBIN UA   --  Interference- unable to analyze*   UROBILINOGEN UA E U /dl  -- 0  2   LEUKOCYTES UA   --  Negative     CT Abd & Pelvis - ( renal stone study  ) - 7/15 - no obstructive uropathy  CXR 7/15 -  No acute  VAS lower limb - 7/15 - Impression:       RIGHT LOWER LIMB:   No evidence of acute or chronic deep vein thrombosis  No evidence of superficial thrombophlebitis noted  Doppler evaluation shows a normal response to augmentation maneuvers  Popliteal, posterior tibial and anterior tibial arterial Doppler waveforms are   triphasic        LEFT LOWER LIMB:   No evidence of acute or chronic deep vein thrombosis  No evidence of superficial thrombophlebitis noted  Doppler evaluation shows a normal response to augmentation maneuvers  Popliteal, posterior tibial and anterior tibial arterial Doppler waveforms are   triphasic  ECG 12 lead (Final result)    Collection Time Result Time Vent R Atrial R VT Int  QRSD Int  QT Int  QTC Int   P Axis QRS Axis T Wave Ax    07/15/21 02:00:14 07/15/21 13:40:05 71 71 158 90 400 434 50 38 47                  Narrative:       Normal sinus rhythm   Normal ECG   When compared with ECG of 12-JUL-2021 23:32,   No significant change was found                 ED Treatment:   Medication Administration from 07/14/2021 2022 to 07/16/2021 0913       Date/Time Order Dose Route Action Comments     07/15/2021 0957 lidocaine (LIDODERM) 5 % patch 1 patch 1 patch Topical Patch Removed      07/14/2021 2257 lidocaine (LIDODERM) 5 % patch 1 patch 1 patch Topical Medication Applied      07/14/2021 2257 ketorolac (TORADOL) injection 15 mg 15 mg Intramuscular Given IV     07/15/2021 0224 pantoprazole (PROTONIX) injection 40 mg 40 mg Intravenous Given      07/15/2021 0822 furosemide (LASIX) tablet 20 mg 20 mg Oral Given      07/15/2021 0517 topiramate (TOPAMAX) tablet 25 mg 25 mg Oral Given      07/15/2021 0224 sodium chloride 0 9 % infusion 75 mL/hr Intravenous New Bag      07/15/2021 0519 al mag oxide-diphenhydramine-lidocaine viscous (MAGIC MOUTHWASH) suspension 10 mL 10 mL Swish & Swallow Given      07/15/2021 0518 pantoprazole (PROTONIX) EC tablet 40 mg 40 mg Oral Given      07/15/2021 0826 potassium chloride 20 mEq IVPB (premix) 20 mEq Intravenous New Bag      07/15/2021 1302 dextrose 5 % and sodium chloride 0 9 % infusion 75 mL/hr Intravenous New Bag      07/15/2021 1256 dextrose 5 % and sodium chloride 0 9 % infusion 100 mL/hr Intravenous Rate/Dose Change      07/15/2021 1253 dextrose 5 % and sodium chloride 0 9 % infusion 75 mL/hr Intravenous Continue from Pre-op      07/15/2021 0755 dextrose 5 % and sodium chloride 0 9 % infusion 75 mL/hr Intravenous New Bag      07/15/2021 1348 ondansetron (ZOFRAN) injection 4 mg 4 mg Intravenous Given         Past Medical History:   Diagnosis Date    Asthma      Present on Admission:   Mild intermittent asthma without complication   SOB (shortness of breath)      Admitting Diagnosis: Flank pain [R10 9]  Age/Sex: 28 y o  female         Admission Orders:  Scheduled Medications:  Medication Dose Route Frequency    albuterol (PROVENTIL HFA,VENTOLIN HFA) inhaler 2 puff  2 puff Inhalation Q6H PRN    dextrose 5 % and sodium chloride 0 9 % infusion  75 mL/hr Intravenous Continuous    fluticasone-vilanterol (BREO ELLIPTA) 100-25 mcg/inh inhaler 1 puff  1 puff Inhalation Daily    furosemide (LASIX) tablet 20 mg  20 mg Oral Daily    pantoprazole (PROTONIX) EC tablet 40 mg  40 mg Oral BID AC    potassium chloride 20 mEq IVPB (premix)  20 mEq Intravenous TID    topiramate (TOPAMAX) tablet 25 mg  25 mg Oral Daily             Network Utilization Review Department  ATTENTION: Please call with any questions or concerns to [phone] and carefully listen to the prompts so that you are directed to the right person   All voicemails are confidential   [employer] all requests for admission clinical reviews, approved or denied determinations and any other requests to dedicated fax number below belonging to the campus where the patient is receiving treatment   List of dedicated fax numbers for the Facilities:  [address] [place] [address] DENIALS (Administrative/Medical Necessity) [phone]   [address]  (Maternity/NICU/Pediatrics) [phone]   [address] [place] [address] Dr Mikey Kaiser [address] [address] [place] [address] [address] [facility] [address] [phone]

## 2021-07-29 ENCOUNTER — TELEPHONE (OUTPATIENT)
Dept: OTHER | Facility: HOSPITAL | Age: 36
End: 2021-07-29

## 2021-08-02 ENCOUNTER — TELEPHONE (OUTPATIENT)
Dept: NEUROLOGY | Facility: CLINIC | Age: 36
End: 2021-08-02

## 2021-08-02 NOTE — TELEPHONE ENCOUNTER
See other phone encounter  Looked for a sooner consult w  [address]  PGY 1, 2 and 3    NOTHING is open      Will keep looking

## 2021-08-02 NOTE — TELEPHONE ENCOUNTER
----- Message from Hannah Smith RN sent at 7/30/2021 10:31 AM EDT -----  Regarding: FW: Robin Bee - Chronic Intractable Headaches with concern for IIH  Please see below, apparently patient back home!  ----- Message -----  From: Lawson Irnee LPN  Sent: 9/53/5532   6:18 PM EDT  To: Oc Moeller MD, Hannah Smith RN, #  Subject: Jessie Eastern: Robin Bee - Chronic Intractable Headache#    I called and spoke to patient and made her aware neuro office has been trying to reach her  She said she was on vacation and did not have good cell service  I provided her with the phone number to call and schedule the earlier appt  She is very thankful to be able to get in sooner    ----- Message -----  From: Wilfred Luna DO  Sent: 7/29/2021   6:13 PM EDT  To: [facility] Clinical  Subject: FW: Robin Bee - Chronic Intractable Headache#    Please call patient to notify her that the neurology office has been attempting to call her to have her be seen earlier than 10/2021  Patient should give the neurology office a call to schedule an expedited appointment  Thank you    ----- Message -----  From: Oc Moeller MD  Sent: 7/14/2021   3:39 PM EDT  To: Hannah Smith RN, Janette Garcia DO  Subject: RE: Robin Bee - Chronic Intractable Headache#    Hi Janette, we have tried to reach the patient several times without success  If you have a way of reaching her it may help us get an expedited appt for her  Thx j  ----- Message -----  From: Wilfred Luna DO  Sent: 6/17/2021   6:10 PM EDT  To: Oc Moeller MD  Subject: Leno Linares  I hope all is well  I am writing to you in regards to my patient, Crow Ro, who established care with me back in 8/2020  She originally presented to me with chronic intractable headaches primarily in the right occipital/frontal area with associated right ear "whoosing" and pulsatile right orbital pain   This symptoms have occurred alongside a rapid 80 lbs weight gain  Given concern for possible IIH, I had ordered a MRI brain 3/10/2021 which did show "prominent CSF within the optic nerve she is identified with partially of the sella turicia - IIH should be considered " The patient was then started on Acetazolamide, Topamax, and indomethacin with mild improvement symptoms  She has not had an LP to confirm the diagnosis, however this was just ordered by myself during today's visit  She has attempted to make an appointment with Ophthalmology as well as Neurology but unfortunately has not yet been seen  She was previously scheduled for an appointment with yourself on 06/03/2021; however, this appointment was canceled as she did test positive for COVID-19 day prior  Patient was seen and examined in the residency clinic by myself today  On physical exam, she has no focal neurologic deficits, no evidence of papilledema, and intact visual acuity  I have transitioned her from acetazolamide to furosemide as she has developed a widespread rash  Currently, she is scheduled for an appointment with you in October  Unfortunately, this was the earliest available appointment  Given the chronicity of her symptoms and the fact that they have continued to affect her daily activities, I wanted to write to you to see if there is any possibility she could be seen in your office sooner  Any help or recommendations that you would be able to provide at this time would be so greatly appreciated  Thank you so much enjoyed the remainder of your day

## 2021-08-03 ENCOUNTER — TELEPHONE (OUTPATIENT)
Dept: INTERNAL MEDICINE CLINIC | Facility: CLINIC | Age: 36
End: 2021-08-03

## 2021-08-03 NOTE — TELEPHONE ENCOUNTER
Patient was a no show today I called to r/s she stated she forgot  Appt rescheduled 09/29/2021   Advised of policy pt understood

## 2021-09-09 NOTE — TELEPHONE ENCOUNTER
LMOM offering  Today  Thursday 9/9/21  [place]  CV    Now have  1130  And  1230  Open  Cannot save - pt must c/b asap to r/s her Oct consult    Gave Team 4 ext

## 2021-09-16 ENCOUNTER — TELEPHONE (OUTPATIENT)
Dept: NEUROLOGY | Facility: CLINIC | Age: 36
End: 2021-09-16

## 2021-09-16 NOTE — TELEPHONE ENCOUNTER
SEE other phone encounter      Need to r/s her Oct [address] working IP [facility]

## 2021-09-16 NOTE — TELEPHONE ENCOUNTER
----- Message from Katie Levin RN sent at 7/30/2021 10:31 AM EDT -----  Regarding: FW: Kristie Masters - Chronic Intractable Headaches with concern for IIH  Please see below, apparently patient back home!  ----- Message -----  From: Hedy Erickson LPN  Sent: 8/22/1741   6:18 PM EDT  To: Ric Boss MD, Katie Levin RN, #  Subject: Neean Haskins: Kristie Masters - Chronic Intractable Headache#    I called and spoke to patient and made her aware neuro office has been trying to reach her  She said she was on vacation and did not have good cell service  I provided her with the phone number to call and schedule the earlier appt  She is very thankful to be able to get in sooner    ----- Message -----  From: Amadeo Chung DO  Sent: 7/29/2021   6:13 PM EDT  To: [facility] Clinical  Subject: FW: Kristie Masters - Chronic Intractable Headache#    Please call patient to notify her that the neurology office has been attempting to call her to have her be seen earlier than 10/2021  Patient should give the neurology office a call to schedule an expedited appointment  Thank you    ----- Message -----  From: Ric Boss MD  Sent: 7/14/2021   3:39 PM EDT  To: Katie Levin RN, Janette Garcia DO  Subject: RE: Kristie Masters - Chronic Intractable Headache#    Hi Janette, we have tried to reach the patient several times without success  If you have a way of reaching her it may help us get an expedited appt for her  Thx j  ----- Message -----  From: Amadeo Chung DO  Sent: 6/17/2021   6:10 PM EDT  To: Ric Boss MD  Subject: Luca España  I hope all is well  I am writing to you in regards to my patient, Chucho Martin, who established care with me back in 8/2020  She originally presented to me with chronic intractable headaches primarily in the right occipital/frontal area with associated right ear "whoosing" and pulsatile right orbital pain   This symptoms have occurred alongside a rapid 80 lbs weight gain  Given concern for possible IIH, I had ordered a MRI brain 3/10/2021 which did show "prominent CSF within the optic nerve she is identified with partially of the sella turicia - IIH should be considered " The patient was then started on Acetazolamide, Topamax, and indomethacin with mild improvement symptoms  She has not had an LP to confirm the diagnosis, however this was just ordered by myself during today's visit  She has attempted to make an appointment with Ophthalmology as well as Neurology but unfortunately has not yet been seen  She was previously scheduled for an appointment with yourself on 06/03/2021; however, this appointment was canceled as she did test positive for COVID-19 day prior  Patient was seen and examined in the residency clinic by myself today  On physical exam, she has no focal neurologic deficits, no evidence of papilledema, and intact visual acuity  I have transitioned her from acetazolamide to furosemide as she has developed a widespread rash  Currently, she is scheduled for an appointment with you in October  Unfortunately, this was the earliest available appointment  Given the chronicity of her symptoms and the fact that they have continued to affect her daily activities, I wanted to write to you to see if there is any possibility she could be seen in your office sooner  Any help or recommendations that you would be able to provide at this time would be so greatly appreciated  Thank you so much enjoyed the remainder of your day

## 2021-09-16 NOTE — TELEPHONE ENCOUNTER
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

## 2021-09-29 ENCOUNTER — OFFICE VISIT (OUTPATIENT)
Dept: INTERNAL MEDICINE CLINIC | Facility: CLINIC | Age: 36
End: 2021-09-29

## 2021-09-29 VITALS
OXYGEN SATURATION: 100 % | DIASTOLIC BLOOD PRESSURE: 91 MMHG | SYSTOLIC BLOOD PRESSURE: 142 MMHG | TEMPERATURE: 98.1 F | HEART RATE: 80 BPM | BODY MASS INDEX: 55.34 KG/M2 | WEIGHT: 293 LBS

## 2021-09-29 DIAGNOSIS — G89.29 CHRONIC INTRACTABLE HEADACHE, UNSPECIFIED HEADACHE TYPE: ICD-10-CM

## 2021-09-29 DIAGNOSIS — R13.10 ODYNOPHAGIA: ICD-10-CM

## 2021-09-29 DIAGNOSIS — I10 ESSENTIAL HYPERTENSION: ICD-10-CM

## 2021-09-29 DIAGNOSIS — R51.9 CHRONIC INTRACTABLE HEADACHE, UNSPECIFIED HEADACHE TYPE: ICD-10-CM

## 2021-09-29 DIAGNOSIS — E66.01 MORBID OBESITY DUE TO EXCESS CALORIES (HCC): ICD-10-CM

## 2021-09-29 DIAGNOSIS — G57.13 MERALGIA PARESTHETICA OF BOTH LOWER EXTREMITIES: ICD-10-CM

## 2021-09-29 DIAGNOSIS — G93.2 IIH (IDIOPATHIC INTRACRANIAL HYPERTENSION): Primary | ICD-10-CM

## 2021-09-29 DIAGNOSIS — R13.10 DYSPHAGIA: ICD-10-CM

## 2021-09-29 PROCEDURE — 3080F DIAST BP >= 90 MM HG: CPT | Performed by: INTERNAL MEDICINE

## 2021-09-29 PROCEDURE — 99214 OFFICE O/P EST MOD 30 MIN: CPT | Performed by: INTERNAL MEDICINE

## 2021-09-29 PROCEDURE — 3077F SYST BP >= 140 MM HG: CPT | Performed by: INTERNAL MEDICINE

## 2021-09-29 RX ORDER — INDOMETHACIN 75 MG/1
75 CAPSULE, EXTENDED RELEASE ORAL
Qty: 30 CAPSULE | Refills: 0 | Status: SHIPPED | OUTPATIENT
Start: 2021-09-29 | End: 2022-04-08

## 2021-09-29 RX ORDER — PANTOPRAZOLE SODIUM 40 MG/1
40 TABLET, DELAYED RELEASE ORAL DAILY
Qty: 30 TABLET | Refills: 0 | Status: SHIPPED | OUTPATIENT
Start: 2021-09-29 | End: 2022-07-08

## 2021-09-29 RX ORDER — FUROSEMIDE 20 MG/1
20 TABLET ORAL DAILY
Qty: 30 TABLET | Refills: 2 | Status: SHIPPED | OUTPATIENT
Start: 2021-09-29

## 2021-09-29 RX ORDER — TOPIRAMATE 25 MG/1
25 TABLET ORAL 2 TIMES DAILY
Qty: 30 TABLET | Refills: 2 | Status: SHIPPED | OUTPATIENT
Start: 2021-09-29 | End: 2022-07-08 | Stop reason: SDUPTHER

## 2021-09-29 RX ORDER — FERROUS SULFATE TAB EC 324 MG (65 MG FE EQUIVALENT) 324 (65 FE) MG
324 TABLET DELAYED RESPONSE ORAL EVERY OTHER DAY
Qty: 30 TABLET | Refills: 3 | Status: SHIPPED | OUTPATIENT
Start: 2021-09-29

## 2021-09-29 RX ORDER — GABAPENTIN 100 MG/1
100 CAPSULE ORAL 3 TIMES DAILY
Qty: 30 CAPSULE | Refills: 2 | Status: SHIPPED | OUTPATIENT
Start: 2021-09-29

## 2021-09-29 RX ORDER — LISINOPRIL 5 MG/1
5 TABLET ORAL DAILY
Qty: 30 TABLET | Refills: 2 | Status: SHIPPED | OUTPATIENT
Start: 2021-09-29 | End: 2022-01-31

## 2021-09-29 RX ORDER — POTASSIUM CHLORIDE 20 MEQ/1
20 TABLET, EXTENDED RELEASE ORAL DAILY
Qty: 30 TABLET | Refills: 2 | Status: SHIPPED | OUTPATIENT
Start: 2021-09-29 | End: 2022-01-31

## 2021-09-29 NOTE — PROGRESS NOTES
ASSESSMENT/PLAN:  Diagnoses and all orders for this visit:    Ms Monse Cortez is a 28year old female with PMHx of chronic headaches thought to be 2/2 IIH, moderate persistent asthma, prediabetes (A1c: 5 9), and morbid obesity (BMI: 55 34):    Chronic Intractable Headache likely 2/2  IIH (Idiopathic intracranial hypertension):   Patient with history of occipital pain with associated pulsatile orbital pain and "whoosing" in right ear  Onset of headaches occurred alongside an 80 lbs weight gain  MRI brain previously completed and demonstrated possible IIH  Patient was started on treatment for IIH  LP has not been completed and patient has not been seen by opthalmology or neurology despite referrals  Patient initially treated with Topamax, Indomethacin, and Diamox, yet patient developed a wide spread rash  Therefore at the last appointment, Diamox was discontinued and patient was started on Lasix 20 mg daily  During today's appointment, patient notes significant improvement in her pain  Continues to have daily occipital pain/pressure that lasts for approx 1-2 hours before resolving  No associated symptoms at this time  Patient scheduled to be seen by neurology and will continue on current medications at an increased dose of Topamax given controlled and improved headaches  · Medications:    · Continue Lasix 20 mg daily; begin Kdurr 20 mEq daily   · Continue Indomethacin 75 mg daily along with protonix 40 mg daily   · Increase to Topamax 25 mg BID  · Continue Ferrous Sulfate 325 mg every other day    · Labs/Studies:  · Repeat CMP and Mg in 1 month   · Lumbar puncture previously ordered and pending   · Referrals:   · Ambulatory referral to Ophthalmology previously placed  · Ambulatory referral to Neurology previously placed; appointment rescheduled for 3/3/2022  · Patient advised to report to the ED immediately if she begins to experience any visual changes      Essential hypertension  BP in office today 142/91  BP has been recurrently elevated during her past clinic appointment  Initially attempted to treat with lifestyle changes but unfortunately unsuccessful  Will therefore begin on low dose lisinopril at this time  · Begin taking Lisinopril 5 mg daily; script sent to patient's pharmacy   · Advised continued lifestyle modifications including diet and exercise     Odynophagia  History of odynophagia with recent EGD completed on 7/15/2021 demonstrating mild localized erythematous mucosa in the GE junction  Symptoms well controlled at this time on Protonix 40 mg daily and thus will continue  · Continue Protonix 40 mg daily     Meralgia paresthetica of both lower extremities  Patient presents today with the complaint of pain/numbness of the anterolateral portion of her bilateral LE extending from her hip to her knee  Pain occurs with both rest and exertion and prominent at night  Patient denies any back pain  Given patient's BMI and description of symptoms and their location, symptoms most consistent with meralgia paresthetica  Will trial on gabapentin, yet discussed need for lifestyle modifications a the first line of treatment  · Begin gabapentin 100 mg TID; script sent to patient's pharmacy  · May utilize Voltaren gel; script sent to patient's pharmacy   · Weight loss advised; encouraged to continue to make lifestyle modifications including diet and exercise     Morbid obesity due to excess calories [facility]):   Weight 312 lbs with a BMI of 55 34  Significant weight gain over the past 2 years with difficulty losing weight with lifestyle modifications  Patient currently on Topamax for IIH but will increase dosage at this time to aide in weight loss  Patient not ready to discuss weight loss surgery but is agreeable to bariatrics referrals at this time     · Increase to Topamax 25 mg twice daily   · Ambulatory referral to weight management placed  · Advised continued lifestyle modifications including diet and exercise    Health Maintenance:   Labs:  o Hemoglobin A1c: 5 9 (2017)  o HIV: Negative (2020)  o Hepatitis C: Negative (2020)  o Lipid panel: Ordered   Cancer Screening:  o Cervical: Negative PAP in 2018; patient follows with OB/GYN   Vaccinations:  o Influenza: Refuses  o PNA: UTD  o COVID: Refuses  o Tdap: UTD   Other:  o PHQ9: UTD  o BMI:UTD    Schedule a follow-up appointment in 3 months for continued headache management  CHIEF COMPLAINT: Management of chronic medical conditions    HISTORY OF PRESENT ILLNESS:   Ms Christal Fernandez is a 28year old female with PMHx of chronic headaches thought to be 2/2 IIH, moderate persistent asthma, prediabetes (A1c: 5 9), and morbid obesity (BMI: 55 34) who presents to the office today, 9/29/2021, for management of her chronic medical conditions  During today's visit, patient continues to complain of headaches but notes significant improvement  Pain is described as a pressure in the occiput that occurs daily but lasts for 1-2 hours after being relieved by tylenol  No associated symptoms including visual or hearing changes  Patient does admit compliance with current regimen but has not been seen by ophthalmology and neurology given scheduling issues and considering patient relies on [employer] for transportation  In addition to continued headaches, patient also complaining of bilateral LE pain  Pain primarily localized to upper legs extending from her hip to the knee on the anterolateral aspect  Patient describes the pain as a cramp-like burning pain/numbness that occurs with both rest and exertion  Associated with feeling or restlessness at night  Also occasionally associated with b/l leg swelling and sense of b/l LE heaviness  No alleviating factors and symptoms have been ongoing for weeks now  Patient does admit to trying to loss weight by dieting via portion control but does admit to difficulties in maintaining her compliance      The following portions of the patient's history were reviewed and updated as appropriate: allergies, current medications, past family history, past medical history, past social history, past surgical history and problem list     Review of Systems   Constitutional: Negative for activity change, appetite change, chills and fever  HENT: Negative for sinus pressure, sinus pain, tinnitus and trouble swallowing  Eyes: Negative for photophobia and visual disturbance  Respiratory: Negative for shortness of breath and wheezing  Cardiovascular: Positive for leg swelling  Negative for chest pain and palpitations  Gastrointestinal: Negative for abdominal distention, abdominal pain, constipation and vomiting  Endocrine: Negative for polydipsia, polyphagia and polyuria  Genitourinary: Negative for flank pain and frequency  Musculoskeletal: Positive for gait problem  Negative for back pain  Skin: Negative for color change and pallor  Neurological: Positive for weakness, numbness and headaches  Negative for dizziness and light-headedness  Psychiatric/Behavioral: Negative for agitation and confusion  OBJECTIVE:  Vitals:    09/29/21 1346   BP: 142/91   BP Location: Right arm   Patient Position: Sitting   Cuff Size: Standard   Pulse: 80   Temp: 98 1 °F (36 7 °C)   TempSrc: Temporal   SpO2: 100%   Weight: (!) 142 kg (312 lb 6 4 oz)     Physical Exam  Constitutional:       General: She is not in acute distress  Appearance: Normal appearance  She is obese  She is not ill-appearing  HENT:      Head: Normocephalic and atraumatic  Nose: Nose normal  No congestion  Mouth/Throat:      Mouth: Mucous membranes are moist       Pharynx: Oropharynx is clear  No oropharyngeal exudate  Eyes:      General: Lids are normal  Vision grossly intact  Gaze aligned appropriately  No visual field deficit or scleral icterus  Right eye: No discharge  Left eye: No discharge        Conjunctiva/sclera: Conjunctivae normal  Cardiovascular:      Rate and Rhythm: Normal rate and regular rhythm  Pulses: Normal pulses  Heart sounds: Normal heart sounds  No murmur heard  Pulmonary:      Effort: Pulmonary effort is normal       Breath sounds: Normal breath sounds  No wheezing  Abdominal:      General: Abdomen is flat  Bowel sounds are normal       Palpations: Abdomen is soft  Tenderness: There is no abdominal tenderness  Musculoskeletal:         General: Normal range of motion  Cervical back: Normal range of motion  No rigidity  Right lower leg: Edema present  Left lower leg: Edema present  Comments: Trace b/l LE edema with intact distal pulses   Lymphadenopathy:      Cervical: No cervical adenopathy  Skin:     General: Skin is warm and dry  Capillary Refill: Capillary refill takes less than 2 seconds  Neurological:      General: No focal deficit present  Mental Status: She is alert and oriented to person, place, and time  Mental status is at baseline  Cranial Nerves: No cranial nerve deficit  Motor: No weakness     Psychiatric:         Mood and Affect: Mood normal          Behavior: Behavior normal          Current Outpatient Medications:     Advair HFA 45-21 MCG/ACT inhaler, INHALE 2 PUFFS 2 (TWO) TIMES A DAY RINSE MOUTH AFTER USE , Disp: 24 Inhaler, Rfl: 3    albuterol (2 5 mg/3 mL) 0 083 % nebulizer solution, TAKE 1 VIAL BY NEBULIZATION EVERY 6 HOURS AS NEEDED FOR WHEEZING OR SHORTNESS OF BREATH, Disp: 75 mL, Rfl: 1    albuterol (PROVENTIL HFA,VENTOLIN HFA) 90 mcg/act inhaler, TAKE 2 PUFFS BY MOUTH EVERY 6 HOURS AS NEEDED FOR WHEEZE, Disp: 8 5 g, Rfl: 0    cyclobenzaprine (FLEXERIL) 5 mg tablet, Take 2 tablets (10 mg total) by mouth daily at bedtime, Disp: 30 tablet, Rfl: 0    ferrous sulfate 324 (65 Fe) mg, Take 1 tablet (324 mg total) by mouth every other day, Disp: 30 tablet, Rfl: 3    furosemide (LASIX) 20 mg tablet, Take 1 tablet (20 mg total) by mouth daily, Disp: 30 tablet, Rfl: 2    indomethacin (INDOCIN SR) 75 mg CR capsule, TAKE 1 CAPSULE (75 MG TOTAL) BY MOUTH DAILY WITH BREAKFAST, Disp: 30 capsule, Rfl: 0    pantoprazole (PROTONIX) 40 mg tablet, Take 1 tablet (40 mg total) by mouth daily, Disp: 30 tablet, Rfl: 0    Riboflavin 400 MG TABS, Take 1 tablet (400 mg total) by mouth daily, Disp: 30 tablet, Rfl: 0    topiramate (TOPAMAX) 25 mg tablet, Take 1 tablet (25 mg total) by mouth daily, Disp: 30 tablet, Rfl: 1    sucralfate (CARAFATE) 1 g/10 mL suspension, Take 10 mL (1 g total) by mouth 4 (four) times a day (with meals and at bedtime) (Patient not taking: Reported on 2021), Disp: 1200 mL, Rfl: 0    Past Medical History:   Diagnosis Date    Asthma      Past Surgical History:   Procedure Laterality Date     SECTION       Social History     Socioeconomic History    Marital status: /Civil Union     Spouse name: Not on file    Number of children: Not on file    Years of education: Not on file    Highest education level: Not on file   Occupational History    Not on file   Tobacco Use    Smoking status: Never Smoker    Smokeless tobacco: Never Used   Vaping Use    Vaping Use: Never used   Substance and Sexual Activity    Alcohol use: No    Drug use: No    Sexual activity: Yes   Other Topics Concern    Not on file   Social History Narrative    Social problem      Social Determinants of Health     Financial Resource Strain: Medium Risk    Difficulty of Paying Living Expenses: Somewhat hard   Food Insecurity: Food Insecurity Present    Worried About Running Out of Food in the Last Year: Sometimes true    Rey of Food in the Last Year: Never true   Transportation Needs: Unmet Transportation Needs    Lack of Transportation (Medical):  Yes    Lack of Transportation (Non-Medical): Yes   Physical Activity: Inactive    Days of Exercise per Week: 0 days    Minutes of Exercise per Session: 0 min   Stress: Stress Concern Present    Feeling of Stress : To some extent   Social Connections: Moderately Isolated    Frequency of Communication with Friends and Family: More than three times a week    Frequency of Social Gatherings with Friends and Family: More than three times a week    Attends Confucianist Services: Never    Active Member of Clubs or Organizations: No    Attends Club or Organization Meetings: Never    Marital Status:    Intimate Partner Violence: Not At Risk    Fear of Current or Ex-Partner: No    Emotionally Abused: No    Physically Abused: No    Sexually Abused: No     Family History   Problem Relation Age of Onset    No Known Problems Other     Arthritis Mother     Fibromyalgia Mother     Diabetes Family     Cancer Family     Heart disease Family        ==  DO Taryn Izquierdo 73 Internal Medicine PGY-2    [facility]  [address]   [facility] [place] , [address] [address] [facility] [facility]  Office: [phone]  Fax: [phone]

## 2021-10-01 DIAGNOSIS — G57.13 MERALGIA PARESTHETICA OF BOTH LOWER EXTREMITIES: ICD-10-CM

## 2022-01-05 ENCOUNTER — TELEPHONE (OUTPATIENT)
Dept: INTERNAL MEDICINE CLINIC | Facility: CLINIC | Age: 37
End: 2022-01-05

## 2022-01-05 ENCOUNTER — OFFICE VISIT (OUTPATIENT)
Dept: URGENT CARE | Age: 37
End: 2022-01-05
Payer: COMMERCIAL

## 2022-01-05 VITALS
BODY MASS INDEX: 24.8 KG/M2 | RESPIRATION RATE: 20 BRPM | TEMPERATURE: 97.6 F | OXYGEN SATURATION: 99 % | HEIGHT: 63 IN | HEART RATE: 88 BPM | WEIGHT: 140 LBS

## 2022-01-05 DIAGNOSIS — R05.9 COUGH: ICD-10-CM

## 2022-01-05 DIAGNOSIS — Z20.822 EXPOSURE TO COVID-19 VIRUS: ICD-10-CM

## 2022-01-05 DIAGNOSIS — J02.9 SORE THROAT: Primary | ICD-10-CM

## 2022-01-05 LAB — S PYO AG THROAT QL: NEGATIVE

## 2022-01-05 PROCEDURE — U0003 INFECTIOUS AGENT DETECTION BY NUCLEIC ACID (DNA OR RNA); SEVERE ACUTE RESPIRATORY SYNDROME CORONAVIRUS 2 (SARS-COV-2) (CORONAVIRUS DISEASE [COVID-19]), AMPLIFIED PROBE TECHNIQUE, MAKING USE OF HIGH THROUGHPUT TECHNOLOGIES AS DESCRIBED BY CMS-2020-01-R: HCPCS | Performed by: PHYSICIAN ASSISTANT

## 2022-01-05 PROCEDURE — U0005 INFEC AGEN DETEC AMPLI PROBE: HCPCS | Performed by: PHYSICIAN ASSISTANT

## 2022-01-05 PROCEDURE — 87070 CULTURE OTHR SPECIMN AEROBIC: CPT | Performed by: PHYSICIAN ASSISTANT

## 2022-01-05 PROCEDURE — 87880 STREP A ASSAY W/OPTIC: CPT | Performed by: PHYSICIAN ASSISTANT

## 2022-01-05 PROCEDURE — 99213 OFFICE O/P EST LOW 20 MIN: CPT | Performed by: PHYSICIAN ASSISTANT

## 2022-01-05 NOTE — TELEPHONE ENCOUNTER
Patient test neg (1/2)with an at home test covid test  Lives with covid +    Symptoms started 12/25  Her ears and throat are hurting, fever  She feels like her throat is on fire and her neck is swollen  She's had strep before and stated this is what it felt like before  She is not vaccinated for covid   Patient advised to go to urgent care

## 2022-01-05 NOTE — PROGRESS NOTES
[facility]        NAME: Annie Babb is a 39 y o  female  : 1985    MRN: [id number]  DATE: 2022  TIME: 2:08 PM    Assessment and Plan   Sore throat [J02 9]  1  Sore throat  POCT rapid strepA    Throat culture   2  Exposure to COVID-19 virus  COVID Only -Office Collect   3  Cough  COVID Only -Office Collect       Rapid strep negative, throat culture pending  Declined combo flu/COVID testing, states she came for COVID testing  COVID swab pending    Patient Instructions     Rapid strep negative, throat culture pending  COVID swab collected today, test results pending  Test results are available between 1-4 days  Your results will come through [address]  Check MyChart and call if needed for test results  Quarantine guidelines discussed  OTC supplements/medications discussed  Follow-up with PCP in the next 1-2 days for re-evaluation  Go to the ED if any fevers, unable to stay hydrated, abdominal pain, chest pain, shortness of breath, wheezing, chest tightness, cough or cold symptoms, new or worsening symptoms or other concerning symptoms  Chief Complaint     Chief Complaint   Patient presents with    Fever     HEAD CONGESTION  SINCE YESTERDAY    Sore Throat    Chills    Headache         History of Present Illness       68-year-old female presents for COVID testing  States yesterday she started with nasal congestion/runny nose, sore throat, bilateral ear pressure, mild intermittent dry cough  States last night she felt hot/cold with chills, states her T-max was 99 9°  States she has not tried anything for it  She denies any loss of taste or smell  Denies any recent travel but states she did have a co-worker who tested positive for co-worker as well as a family member  States she is unvaccinated for COVID as well as flu  She denies any chest pain, chest tightness, shortness of breath, GI/ symptoms or other complaints  Denies any pregnancy risk        Review of Systems   Review of Systems   Constitutional: Positive for chills and fever  Negative for activity change, appetite change and fatigue  HENT: Positive for congestion, ear pain, postnasal drip, rhinorrhea and sore throat  Negative for facial swelling, sinus pressure, trouble swallowing and voice change  Eyes: Negative for discharge, itching and visual disturbance  Respiratory: Positive for cough  Negative for chest tightness, shortness of breath and wheezing  Cardiovascular: Negative for chest pain and palpitations  Gastrointestinal: Negative for abdominal pain, diarrhea, nausea and vomiting  Musculoskeletal: Negative for back pain and neck pain  Skin: Negative for rash  Neurological: Negative for dizziness, syncope, weakness, numbness and headaches  All other systems reviewed and are negative          Current Medications       Current Outpatient Medications:     Advair HFA 45-21 MCG/ACT inhaler, INHALE 2 PUFFS 2 (TWO) TIMES A DAY RINSE MOUTH AFTER USE , Disp: 24 Inhaler, Rfl: 3    albuterol (2 5 mg/3 mL) 0 083 % nebulizer solution, TAKE 1 VIAL BY NEBULIZATION EVERY 6 HOURS AS NEEDED FOR WHEEZING OR SHORTNESS OF BREATH, Disp: 75 mL, Rfl: 1    albuterol (PROVENTIL HFA,VENTOLIN HFA) 90 mcg/act inhaler, TAKE 2 PUFFS BY MOUTH EVERY 6 HOURS AS NEEDED FOR WHEEZE, Disp: 8 5 g, Rfl: 0    cyclobenzaprine (FLEXERIL) 5 mg tablet, Take 2 tablets (10 mg total) by mouth daily at bedtime, Disp: 30 tablet, Rfl: 0    Diclofenac Sodium (VOLTAREN) 1 %, APPLY 2 G TOPICALLY 4 (FOUR) TIMES A DAY AS NEEDED (B/L LE PAIN), Disp: 100 g, Rfl: 1    ferrous sulfate 324 (65 Fe) mg, Take 1 tablet (324 mg total) by mouth every other day, Disp: 30 tablet, Rfl: 3    fluticasone-salmeterol (Advair) 500-50 mcg/dose inhaler, Inhale 1 puff 2 (two) times a day, Disp: , Rfl:     furosemide (LASIX) 20 mg tablet, Take 1 tablet (20 mg total) by mouth daily, Disp: 30 tablet, Rfl: 2    gabapentin (NEURONTIN) 100 mg capsule, Take 1 capsule (100 mg total) by mouth 3 (three) times a day, Disp: 30 capsule, Rfl: 2    indomethacin (INDOCIN SR) 75 mg CR capsule, Take 1 capsule (75 mg total) by mouth daily with breakfast, Disp: 30 capsule, Rfl: 0    lisinopril (ZESTRIL) 5 mg tablet, Take 1 tablet (5 mg total) by mouth daily, Disp: 30 tablet, Rfl: 2    potassium chloride (K-DUR,KLOR-CON) 20 mEq tablet, Take 1 tablet (20 mEq total) by mouth daily, Disp: 30 tablet, Rfl: 2    Riboflavin 400 MG TABS, Take 1 tablet (400 mg total) by mouth daily, Disp: 30 tablet, Rfl: 0    topiramate (TOPAMAX) 25 mg tablet, Take 1 tablet (25 mg total) by mouth 2 (two) times a day, Disp: 30 tablet, Rfl: 2    pantoprazole (PROTONIX) 40 mg tablet, Take 1 tablet (40 mg total) by mouth daily, Disp: 30 tablet, Rfl: 0    sucralfate (CARAFATE) 1 g/10 mL suspension, Take 10 mL (1 g total) by mouth 4 (four) times a day (with meals and at bedtime) (Patient not taking: Reported on 2021), Disp: 1200 mL, Rfl: 0    Current Allergies     Allergies as of 2022    (No Known Allergies)            The following portions of the patient's history were reviewed and updated as appropriate: allergies, current medications, past family history, past medical history, past social history, past surgical history and problem list      Past Medical History:   Diagnosis Date    Asthma        Past Surgical History:   Procedure Laterality Date     SECTION         Family History   Problem Relation Age of Onset    No Known Problems Other     Arthritis Mother     Fibromyalgia Mother     Diabetes Family     Cancer Family     Heart disease Family          Medications have been verified  Objective   Pulse 88   Temp 97 6 °F (36 4 °C)   Resp 20   Ht 5' 3" (1 6 m)   Wt 63 5 kg (140 lb)   LMP 2021 (Approximate)   SpO2 99%   BMI 24 80 kg/m²        Physical Exam     Physical Exam  Vitals and nursing note reviewed     Constitutional:       General: She is not in acute distress  Appearance: She is well-developed  She is not ill-appearing  HENT:      Head: Normocephalic and atraumatic  Right Ear: Tympanic membrane normal       Left Ear: Tympanic membrane normal       Mouth/Throat:      Mouth: Mucous membranes are moist       Pharynx: Uvula midline  Posterior oropharyngeal erythema present  No uvula swelling  Tonsils: No tonsillar exudate  0 on the right  0 on the left  Eyes:      Pupils: Pupils are equal, round, and reactive to light  Cardiovascular:      Rate and Rhythm: Normal rate and regular rhythm  Heart sounds: Normal heart sounds  Pulmonary:      Effort: Pulmonary effort is normal  No respiratory distress  Breath sounds: Normal breath sounds  No wheezing  Musculoskeletal:      Cervical back: Normal range of motion and neck supple  Skin:     Capillary Refill: Capillary refill takes less than 2 seconds  Neurological:      Mental Status: She is alert and oriented to person, place, and time     Psychiatric:         Behavior: Behavior normal

## 2022-01-05 NOTE — PATIENT INSTRUCTIONS
Rapid strep negative, throat culture pending  COVID swab collected today, test results pending  Test results are available between 1-4 days  Your results will come through [address]  Check MyChart and call if needed for test results  Quarantine guidelines discussed  OTC supplements/medications discussed  Follow-up with PCP in the next 1-2 days for re-evaluation  Go to the ED if any fevers, unable to stay hydrated, abdominal pain, chest pain, shortness of breath, wheezing, chest tightness, cough or cold symptoms, new or worsening symptoms or other concerning symptoms  [address]    Your healthcare provider and/or public health staff have evaluated you and have determined that you do not need to remain in the hospital at this time  At this time you can be isolated at home where you will be monitored by staff from your local or state health department  You should carefully follow the prevention and isolation steps below until a healthcare provider or local or state health department says that you can return to your normal activities  Stay home except to get medical care    People who are mildly ill with COVID-19 are able to isolate at home during their illness  You should restrict activities outside your home, except for getting medical care  Do not go to work, school, or public areas  Avoid using public transportation, ride-sharing, or taxis  Separate yourself from other people and animals in your home    People: As much as possible, you should stay in a specific room and away from other people in your home  Also, you should use a separate bathroom, if available  Animals: You should restrict contact with pets and other animals while you are sick with COVID-19, just like you would around other people   Although there have not been reports of pets or other animals becoming sick with COVID-19, it is still recommended that people sick with COVID-19 limit contact with animals until more information is known about the virus  When possible, have another member of your household care for your animals while you are sick  If you are sick with COVID-19, avoid contact with your pet, including petting, snuggling, being kissed or licked, and sharing food  If you must care for your pet or be around animals while you are sick, wash your hands before and after you interact with pets and wear a facemask  See COVID-19 and Animals for more information  Call ahead before visiting your doctor    If you have a medical appointment, call the healthcare provider and tell them that you have or may have COVID-19  This will help the healthcare providers office take steps to keep other people from getting infected or exposed  Wear a facemask    You should wear a facemask when you are around other people (e g , sharing a room or vehicle) or pets and before you enter a healthcare providers office  If you are not able to wear a facemask (for example, because it causes trouble breathing), then people who live with you should not stay in the same room with you, or they should wear a facemask if they enter your room  Cover your coughs and sneezes    Cover your mouth and nose with a tissue when you cough or sneeze  Throw used tissues in a lined trash can  Immediately wash your hands with soap and water for at least 20 seconds or, if soap and water are not available, clean your hands with an alcohol-based hand  that contains at least 60% alcohol  Clean your hands often    Wash your hands often with soap and water for at least 20 seconds, especially after blowing your nose, coughing, or sneezing; going to the bathroom; and before eating or preparing food  If soap and water are not readily available, use an alcohol-based hand  with at least 60% alcohol, covering all surfaces of your hands and rubbing them together until they feel dry  Soap and water are the best option if hands are visibly dirty  Avoid touching your eyes, nose, and mouth with unwashed hands  Avoid sharing personal household items    You should not share dishes, drinking glasses, cups, eating utensils, towels, or bedding with other people or pets in your home  After using these items, they should be washed thoroughly with soap and water  Clean all high-touch surfaces everyday    High touch surfaces include counters, tabletops, doorknobs, bathroom fixtures, toilets, phones, keyboards, tablets, and bedside tables  Also, clean any surfaces that may have blood, stool, or body fluids on them  Use a household cleaning spray or wipe, according to the label instructions  Labels contain instructions for safe and effective use of the cleaning product including precautions you should take when applying the product, such as wearing gloves and making sure you have good ventilation during use of the product  Monitor your symptoms    Seek prompt medical attention if your illness is worsening (e g , difficulty breathing)  Before seeking care, call your healthcare provider and tell them that you have, or are being evaluated for, COVID-19  Put on a facemask before you enter the facility  These steps will help the healthcare providers office to keep other people in the office or waiting room from getting infected or exposed  Ask your healthcare provider to call the local or state health department  Persons who are placed under active monitoring or facilitated self-monitoring should follow instructions provided by their local health department or occupational health professionals, as appropriate  If you have a medical emergency and need to call 911, notify the dispatch personnel that you have, or are being evaluated for COVID-19  If possible, put on a facemask before emergency medical services arrive      Discontinuing home isolation    Patients with confirmed COVID-19 should remain under home isolation precautions until the following conditions are met:   - They have had no fever for at least 24 hours (that is one full day of no fever without the use medicine that reduces fevers)  AND  - other symptoms have improved (for example, when their cough or shortness of breath have improved)  AND  - If had mild or moderate illness, at least 10 days have passed since their symptoms first appeared or if severe illness (needed oxygen) or immunosuppressed, at least 20 days have passed since symptoms first appeared  Patients with confirmed COVID-19 should also notify close contacts (including their workplace) and ask that they self-quarantine  Currently, close contact is defined as being within 6 feet for 15 minutes or more from the period 24 hours starting 48 hours before symptom onset to the time at which the patient went into isolation  Close contacts of patients diagnosed with COVID-19 should be instructed by the patient to self-quarantine for 14 days from the last time of their last contact with the patient       Source: [employer]

## 2022-01-06 LAB — SARS-COV-2 RNA RESP QL NAA+PROBE: NEGATIVE

## 2022-01-07 LAB — BACTERIA THROAT CULT: NORMAL

## 2022-02-21 ENCOUNTER — APPOINTMENT (EMERGENCY)
Dept: RADIOLOGY | Facility: HOSPITAL | Age: 37
End: 2022-02-21
Payer: COMMERCIAL

## 2022-02-21 ENCOUNTER — HOSPITAL ENCOUNTER (EMERGENCY)
Facility: HOSPITAL | Age: 37
Discharge: HOME | End: 2022-02-21
Attending: EMERGENCY MEDICINE | Admitting: EMERGENCY MEDICINE
Payer: COMMERCIAL

## 2022-02-21 VITALS
RESPIRATION RATE: 14 BRPM | DIASTOLIC BLOOD PRESSURE: 76 MMHG | OXYGEN SATURATION: 97 % | HEART RATE: 68 BPM | TEMPERATURE: 98.2 F | SYSTOLIC BLOOD PRESSURE: 132 MMHG

## 2022-02-21 DIAGNOSIS — R07.9 CHEST PAIN: Primary | ICD-10-CM

## 2022-02-21 DIAGNOSIS — J45.909 ASTHMA: ICD-10-CM

## 2022-02-21 LAB
ALBUMIN SERPL BCP-MCNC: 3.2 G/DL (ref 3.5–5)
ALP SERPL-CCNC: 109 U/L (ref 46–116)
ALT SERPL W P-5'-P-CCNC: 22 U/L (ref 12–78)
ANION GAP SERPL CALCULATED.3IONS-SCNC: 6 MMOL/L (ref 4–13)
AST SERPL W P-5'-P-CCNC: 20 U/L (ref 5–45)
BASOPHILS # BLD AUTO: 0.05 THOUSANDS/ΜL (ref 0–0.1)
BASOPHILS NFR BLD AUTO: 1 % (ref 0–1)
BILIRUB SERPL-MCNC: 0.89 MG/DL (ref 0.2–1)
BUN SERPL-MCNC: 7 MG/DL (ref 5–25)
CALCIUM ALBUM COR SERPL-MCNC: 9.6 MG/DL (ref 8.3–10.1)
CALCIUM SERPL-MCNC: 9 MG/DL (ref 8.3–10.1)
CARDIAC TROPONIN I PNL SERPL HS: <2 NG/L
CHLORIDE SERPL-SCNC: 108 MMOL/L (ref 100–108)
CO2 SERPL-SCNC: 25 MMOL/L (ref 21–32)
CREAT SERPL-MCNC: 0.69 MG/DL (ref 0.6–1.3)
EOSINOPHIL # BLD AUTO: 0.56 THOUSAND/ΜL (ref 0–0.61)
EOSINOPHIL NFR BLD AUTO: 6 % (ref 0–6)
ERYTHROCYTE [DISTWIDTH] IN BLOOD BY AUTOMATED COUNT: 16.7 % (ref 11.6–15.1)
EXT PREG TEST URINE: NEGATIVE
EXT. CONTROL ED NAV: NORMAL
GFR SERPL CREATININE-BSD FRML MDRD: 112 ML/MIN/1.73SQ M
GLUCOSE SERPL-MCNC: 108 MG/DL (ref 65–140)
HCT VFR BLD AUTO: 40.3 % (ref 34.8–46.1)
HGB BLD-MCNC: 12 G/DL (ref 11.5–15.4)
IMM GRANULOCYTES # BLD AUTO: 0.03 THOUSAND/UL (ref 0–0.2)
IMM GRANULOCYTES NFR BLD AUTO: 0 % (ref 0–2)
LYMPHOCYTES # BLD AUTO: 2.16 THOUSANDS/ΜL (ref 0.6–4.47)
LYMPHOCYTES NFR BLD AUTO: 24 % (ref 14–44)
MCH RBC QN AUTO: 23.7 PG (ref 26.8–34.3)
MCHC RBC AUTO-ENTMCNC: 29.8 G/DL (ref 31.4–37.4)
MCV RBC AUTO: 80 FL (ref 82–98)
MONOCYTES # BLD AUTO: 0.49 THOUSAND/ΜL (ref 0.17–1.22)
MONOCYTES NFR BLD AUTO: 5 % (ref 4–12)
NEUTROPHILS # BLD AUTO: 5.8 THOUSANDS/ΜL (ref 1.85–7.62)
NEUTS SEG NFR BLD AUTO: 64 % (ref 43–75)
NRBC BLD AUTO-RTO: 0 /100 WBCS
PLATELET # BLD AUTO: 395 THOUSANDS/UL (ref 149–390)
PMV BLD AUTO: 9.9 FL (ref 8.9–12.7)
POTASSIUM SERPL-SCNC: 3.9 MMOL/L (ref 3.5–5.3)
PROT SERPL-MCNC: 8.6 G/DL (ref 6.4–8.2)
RBC # BLD AUTO: 5.06 MILLION/UL (ref 3.81–5.12)
SODIUM SERPL-SCNC: 139 MMOL/L (ref 136–145)
WBC # BLD AUTO: 9.09 THOUSAND/UL (ref 4.31–10.16)

## 2022-02-21 PROCEDURE — 99285 EMERGENCY DEPT VISIT HI MDM: CPT | Performed by: EMERGENCY MEDICINE

## 2022-02-21 PROCEDURE — 84484 ASSAY OF TROPONIN QUANT: CPT

## 2022-02-21 PROCEDURE — 87636 SARSCOV2 & INF A&B AMP PRB: CPT

## 2022-02-21 PROCEDURE — 93005 ELECTROCARDIOGRAM TRACING: CPT

## 2022-02-21 PROCEDURE — 80053 COMPREHEN METABOLIC PANEL: CPT

## 2022-02-21 PROCEDURE — 36415 COLL VENOUS BLD VENIPUNCTURE: CPT

## 2022-02-21 PROCEDURE — 85025 COMPLETE CBC W/AUTO DIFF WBC: CPT

## 2022-02-21 PROCEDURE — 71045 X-RAY EXAM CHEST 1 VIEW: CPT

## 2022-02-21 PROCEDURE — 81025 URINE PREGNANCY TEST: CPT

## 2022-02-21 PROCEDURE — 99285 EMERGENCY DEPT VISIT HI MDM: CPT

## 2022-02-21 PROCEDURE — 94640 AIRWAY INHALATION TREATMENT: CPT

## 2022-02-21 RX ORDER — IPRATROPIUM BROMIDE AND ALBUTEROL SULFATE 2.5; .5 MG/3ML; MG/3ML
3 SOLUTION RESPIRATORY (INHALATION) ONCE
Status: COMPLETED | OUTPATIENT
Start: 2022-02-21 | End: 2022-02-21

## 2022-02-21 RX ORDER — IPRATROPIUM BROMIDE AND ALBUTEROL SULFATE 2.5; .5 MG/3ML; MG/3ML
3 SOLUTION RESPIRATORY (INHALATION)
Status: DISCONTINUED | OUTPATIENT
Start: 2022-02-21 | End: 2022-02-21

## 2022-02-21 RX ADMIN — IPRATROPIUM BROMIDE AND ALBUTEROL SULFATE 3 ML: 2.5; .5 SOLUTION RESPIRATORY (INHALATION) at 12:34

## 2022-02-21 NOTE — ED PROVIDER NOTES
History  Chief Complaint   Patient presents with    Chest Pain     on and off for a week or so, it seems like the more stress i have the worse it gets  and now having numbness in my left arm that has been on going for awhile and it comes and goes  i was visiting my grandmother today and thats when chest pain got worse  c/o some sob denies n/v     79-year-old female patient with history of asthma, pseudotumor presenting with intermittent tight chest pain, shortness of breath, left arm numbness onset 1 month ago  Patient also states that she has a dry cough  States her symptoms seem to be related to stress and states she is stressed because of her work, finances, and sick grandmother  Patient describes the chest pain as left-sided chest tightness radiating to her left upper extremity with numbness and heaviness  Patient states that she has diaphoresis at night but states this is probably due to the room being hot  Denies fever, leg pain, leg swelling, abdominal pain, nausea, vomiting, diarrhea, urinary symptoms  Denies history of DVT, smoking, drug use  Prior to Admission Medications   Prescriptions Last Dose Informant Patient Reported? Taking? Advair HFA 45-21 MCG/ACT inhaler   No No   Sig: INHALE 2 PUFFS 2 (TWO) TIMES A DAY RINSE MOUTH AFTER USE     Diclofenac Sodium (VOLTAREN) 1 %   No No   Sig: APPLY 2 G TOPICALLY 4 (FOUR) TIMES A DAY AS NEEDED (B/L LE PAIN)   Klor-Con M20 20 MEQ tablet   No No   Sig: TAKE 1 TABLET BY MOUTH EVERY DAY   Riboflavin 400 MG TABS   No No   Sig: Take 1 tablet (400 mg total) by mouth daily   albuterol (2 5 mg/3 mL) 0 083 % nebulizer solution   No No   Sig: TAKE 1 VIAL BY NEBULIZATION EVERY 6 HOURS AS NEEDED FOR WHEEZING OR SHORTNESS OF BREATH   albuterol (PROVENTIL HFA,VENTOLIN HFA) 90 mcg/act inhaler   No No   Sig: TAKE 2 PUFFS BY MOUTH EVERY 6 HOURS AS NEEDED FOR WHEEZE   cyclobenzaprine (FLEXERIL) 5 mg tablet   No No   Sig: Take 2 tablets (10 mg total) by mouth daily at bedtime   ferrous sulfate 324 (65 Fe) mg   No No   Sig: Take 1 tablet (324 mg total) by mouth every other day   fluticasone-salmeterol (Advair) 500-50 mcg/dose inhaler   Yes No   Sig: Inhale 1 puff 2 (two) times a day   furosemide (LASIX) 20 mg tablet   No No   Sig: Take 1 tablet (20 mg total) by mouth daily   gabapentin (NEURONTIN) 100 mg capsule   No No   Sig: Take 1 capsule (100 mg total) by mouth 3 (three) times a day   indomethacin (INDOCIN SR) 75 mg CR capsule   No No   Sig: Take 1 capsule (75 mg total) by mouth daily with breakfast   lisinopril (ZESTRIL) 5 mg tablet   No No   Sig: TAKE 1 TABLET BY MOUTH EVERY DAY   pantoprazole (PROTONIX) 40 mg tablet   No No   Sig: Take 1 tablet (40 mg total) by mouth daily   sucralfate (CARAFATE) 1 g/10 mL suspension   No No   Sig: Take 10 mL (1 g total) by mouth 4 (four) times a day (with meals and at bedtime)   Patient not taking: Reported on 2021   topiramate (TOPAMAX) 25 mg tablet   No No   Sig: Take 1 tablet (25 mg total) by mouth 2 (two) times a day      Facility-Administered Medications: None       Past Medical History:   Diagnosis Date    Asthma        Past Surgical History:   Procedure Laterality Date     SECTION         Family History   Problem Relation Age of Onset    No Known Problems Other     Arthritis Mother     Fibromyalgia Mother     Diabetes Family     Cancer Family     Heart disease Family      I have reviewed and agree with the history as documented  E-Cigarette/Vaping    E-Cigarette Use Never User      E-Cigarette/Vaping Substances    Nicotine No     THC No     CBD No     Flavoring No     Other No     Unknown No      Social History     Tobacco Use    Smoking status: Never Smoker    Smokeless tobacco: Never Used   Vaping Use    Vaping Use: Never used   Substance Use Topics    Alcohol use: No    Drug use: No        Review of Systems   Constitutional: Negative for chills, fatigue and fever     HENT: Negative for congestion, rhinorrhea and sore throat  Eyes: Negative for pain and visual disturbance  Respiratory: Positive for chest tightness and shortness of breath  Cardiovascular: Positive for chest pain  Negative for leg swelling  Gastrointestinal: Negative for abdominal distention, abdominal pain, diarrhea, nausea and vomiting  Genitourinary: Negative for difficulty urinating and dysuria  Musculoskeletal: Negative for arthralgias, back pain and myalgias  Skin: Negative for rash and wound  Neurological: Positive for numbness  Negative for dizziness, weakness and headaches  All other systems reviewed and are negative  Physical Exam  ED Triage Vitals [02/21/22 1132]   Temperature Pulse Respirations Blood Pressure SpO2   98 2 °F (36 8 °C) 73 16 (!) 162/106 98 %      Temp Source Heart Rate Source Patient Position - Orthostatic VS BP Location FiO2 (%)   Temporal Monitor Sitting Right arm --      Pain Score       6             Orthostatic Vital Signs  Vitals:    02/21/22 1132 02/21/22 1145 02/21/22 1200 02/21/22 1300   BP: (!) 162/106 159/89 146/88 132/76   Pulse: 73 74 68 68   Patient Position - Orthostatic VS: Sitting Lying         Physical Exam  Vitals reviewed  Constitutional:       Appearance: Normal appearance  She is obese  HENT:      Head: Normocephalic and atraumatic  Nose: Nose normal       Mouth/Throat:      Mouth: Mucous membranes are moist       Pharynx: Oropharynx is clear  Eyes:      Extraocular Movements: Extraocular movements intact  Conjunctiva/sclera: Conjunctivae normal    Cardiovascular:      Rate and Rhythm: Normal rate and regular rhythm  Pulses: Normal pulses  Heart sounds: Normal heart sounds  Pulmonary:      Effort: Pulmonary effort is normal       Breath sounds: Wheezing (Mild expiratory wheezes) present  Abdominal:      General: Bowel sounds are normal       Palpations: Abdomen is soft  Tenderness: There is no abdominal tenderness  Musculoskeletal:         General: Normal range of motion  Cervical back: Normal range of motion  Right lower leg: No tenderness  No edema  Left lower leg: No tenderness  No edema  Skin:     General: Skin is warm and dry  Neurological:      General: No focal deficit present  Mental Status: She is alert and oriented to person, place, and time  Mental status is at baseline  ED Medications  Medications   ipratropium-albuterol (DUO-NEB) 0 5-2 5 mg/3 mL inhalation solution 3 mL (3 mL Nebulization Given 2/21/22 1234)       Diagnostic Studies  Results Reviewed     Procedure Component Value Units Date/Time    COVID/FLU - 24 hour TAT [[id number]]  (Normal) Collected: 02/21/22 1234    Lab Status: Final result Specimen: Nares from Nose Updated: 02/22/22 1407     SARS-CoV-2 Negative     INFLUENZA A PCR Negative     INFLUENZA B PCR Negative    Narrative:      FOR PEDIATRIC PATIENTS - copy/paste COVID Guidelines URL to browser: [email or web address]  [email or web address]    SARS-CoV-2 assay is a Nucleic Acid Amplification assay intended for the  qualitative detection of nucleic acid from SARS-CoV-2 in nasopharyngeal  swabs  Results are for the presumptive identification of SARS-CoV-2 RNA  Positive results are indicative of infection with SARS-CoV-2, the virus  causing COVID-19, but do not rule out bacterial infection or co-infection  with other viruses  Laboratories within the [place] and its  territories are required to report all positive results to the appropriate  public health authorities  Negative results do not preclude SARS-CoV-2  infection and should not be used as the sole basis for treatment or other  patient management decisions  Negative results must be combined with  clinical observations, patient history, and epidemiological information  This test has not been FDA cleared or approved      This test has been authorized by FDA under an Emergency Use Authorization  (EUA)  This test is only authorized for the duration of time the  declaration that circumstances exist justifying the authorization of the  emergency use of an in vitro diagnostic tests for detection of SARS-CoV-2  virus and/or diagnosis of COVID-19 infection under section 564(b)(1) of  the Act, 21 U  S C  935BEY-8(G)(3), unless the authorization is terminated  or revoked sooner  The test has been validated but independent review by FDA  and CLIA is pending  Test performed using the Roche jasmyne [id number] System: This RT-PCR assay  targets ORF1, a region unique to SARS-CoV-2  A conserved region in the  E-gene was chosen for pan-Sarbecovirus detection which includes  SARS-CoV-2        POCT pregnancy, urine [[id number]]  (Normal) Resulted: 02/21/22 1337    Lab Status: Final result Updated: 02/21/22 1337     EXT PREG TEST UR (Ref: Negative) negative     Control valid    HS Troponin 0hr (reflex protocol) [[id number]]  (Normal) Collected: 02/21/22 1224    Lab Status: Final result Specimen: Blood from Arm, Right Updated: 02/21/22 1300     hs TnI 0hr <2 ng/L     Comprehensive metabolic panel [[id number]]  (Abnormal) Collected: 02/21/22 1224    Lab Status: Final result Specimen: Blood from Arm, Right Updated: 02/21/22 1259     Sodium 139 mmol/L      Potassium 3 9 mmol/L      Chloride 108 mmol/L      CO2 25 mmol/L      ANION GAP 6 mmol/L      BUN 7 mg/dL      Creatinine 0 69 mg/dL      Glucose 108 mg/dL      Calcium 9 0 mg/dL      Corrected Calcium 9 6 mg/dL      AST 20 U/L      ALT 22 U/L      Alkaline Phosphatase 109 U/L      Total Protein 8 6 g/dL      Albumin 3 2 g/dL      Total Bilirubin 0 89 mg/dL      eGFR 112 ml/min/1 73sq m     Narrative:      [place] guidelines for Chronic Kidney Disease (CKD):     Stage 1 with normal or high GFR (GFR > 90 mL/min/1 73 square meters)    Stage 2 Mild CKD (GFR = 60-89 mL/min/1 73 square meters)    Stage 3A Moderate CKD (GFR = 45-59 mL/min/1 73 square meters)    Stage 3B Moderate CKD (GFR = 30-44 mL/min/1 73 square meters)    Stage 4 Severe CKD (GFR = 15-29 mL/min/1 73 square meters)    Stage 5 End Stage CKD (GFR <15 mL/min/1 73 square meters)  Note: GFR calculation is accurate only with a steady state creatinine    CBC and differential [[id number]]  (Abnormal) Collected: 02/21/22 1224    Lab Status: Final result Specimen: Blood from Arm, Right Updated: 02/21/22 1235     WBC 9 09 Thousand/uL      RBC 5 06 Million/uL      Hemoglobin 12 0 g/dL      Hematocrit 40 3 %      MCV 80 fL      MCH 23 7 pg      MCHC 29 8 g/dL      RDW 16 7 %      MPV 9 9 fL      Platelets 254 Thousands/uL      nRBC 0 /100 WBCs      Neutrophils Relative 64 %      Immat GRANS % 0 %      Lymphocytes Relative 24 %      Monocytes Relative 5 %      Eosinophils Relative 6 %      Basophils Relative 1 %      Neutrophils Absolute 5 80 Thousands/µL      Immature Grans Absolute 0 03 Thousand/uL      Lymphocytes Absolute 2 16 Thousands/µL      Monocytes Absolute 0 49 Thousand/µL      Eosinophils Absolute 0 56 Thousand/µL      Basophils Absolute 0 05 Thousands/µL                  XR chest 1 view portable   ED Interpretation by lAe Villalobos MD (02/21 1345)   No acute cardiopulmonary process      Final Result by Carmina Gusman MD (02/21 1421)      No acute cardiopulmonary disease                    Workstation performed: [id number]               Procedures  Procedures      ED Course  ED Course as of 02/24/22 1620   Mon Feb 21, 2022   1244 EKG: normal EKG, normal sinus rhythm, unchanged from previous tracings               HEART Risk Score      Most Recent Value   Heart Score Risk Calculator    History 0 Filed at: 02/24/2022 1620   ECG 0 Filed at: 02/24/2022 1620   Age 0 Filed at: 02/24/2022 1620   Risk Factors 0 Filed at: 02/24/2022 1620   Troponin 0 Filed at: 02/24/2022 1620   HEART Score 0 Filed at: 02/24/2022 1620                                MDM  Number of Diagnoses or Management Options  Asthma  Chest pain  Diagnosis management comments: 39 y o  patient with hx of asthma, pseudotumor presenting with intermittent chest pain, cough, SOB, left arm numbness  No hx of DVT, smoking  Exam shows mild wheezing  Labs WNL, trop negative  Imaging cxray WNL  Improved with albuterol  Stable for discharge with follow up with PCP  Return precautions given  Disposition  Final diagnoses:   Chest pain   Asthma     Time reflects when diagnosis was documented in both MDM as applicable and the Disposition within this note     Time User Action Codes Description Comment    2/21/2022  1:39 PM Zelphia Hoar [facility] Add [R07 9] Chest pain     2/21/2022  1:39 PM Zelphia Hoar [facility] Add [M02 362] Asthma       ED Disposition     ED Disposition Condition Date/Time Comment    Discharge Stable Mon Feb 21, 2022  1:44 PM [address] discharge to home/self care              Follow-up Information    None         Discharge Medication List as of 2/21/2022  1:44 PM      CONTINUE these medications which have NOT CHANGED    Details   Advair HFA 45-21 MCG/ACT inhaler INHALE 2 PUFFS 2 (TWO) TIMES A DAY RINSE MOUTH AFTER USE , Starting Tue 3/2/2021, Normal      albuterol (2 5 mg/3 mL) 0 083 % nebulizer solution TAKE 1 VIAL BY NEBULIZATION EVERY 6 HOURS AS NEEDED FOR WHEEZING OR SHORTNESS OF BREATH, Normal      albuterol (PROVENTIL HFA,VENTOLIN HFA) 90 mcg/act inhaler TAKE 2 PUFFS BY MOUTH EVERY 6 HOURS AS NEEDED FOR WHEEZE, Normal      cyclobenzaprine (FLEXERIL) 5 mg tablet Take 2 tablets (10 mg total) by mouth daily at bedtime, Starting Tue 4/6/2021, Normal      Diclofenac Sodium (VOLTAREN) 1 % APPLY 2 G TOPICALLY 4 (FOUR) TIMES A DAY AS NEEDED (B/L LE PAIN), Starting Fri 10/8/2021, Normal      ferrous sulfate 324 (65 Fe) mg Take 1 tablet (324 mg total) by mouth every other day, Starting Wed 9/29/2021, Normal      fluticasone-salmeterol (Advair) 500-50 mcg/dose inhaler Inhale 1 puff 2 (two) times a day, [facility] furosemide (LASIX) 20 mg tablet Take 1 tablet (20 mg total) by mouth daily, Starting Wed 9/29/2021, Normal      gabapentin (NEURONTIN) 100 mg capsule Take 1 capsule (100 mg total) by mouth 3 (three) times a day, Starting Wed 9/29/2021, Normal      indomethacin (INDOCIN SR) 75 mg CR capsule Take 1 capsule (75 mg total) by mouth daily with breakfast, Starting Wed 9/29/2021, Normal      Klor-Con M20 20 MEQ tablet TAKE 1 TABLET BY MOUTH EVERY DAY, Normal      lisinopril (ZESTRIL) 5 mg tablet TAKE 1 TABLET BY MOUTH EVERY DAY, Normal      pantoprazole (PROTONIX) 40 mg tablet Take 1 tablet (40 mg total) by mouth daily, Starting Wed 9/29/2021, Until Fri 10/29/2021, Normal      Riboflavin 400 MG TABS Take 1 tablet (400 mg total) by mouth daily, Starting Tue 4/6/2021, Normal      sucralfate (CARAFATE) 1 g/10 mL suspension Take 10 mL (1 g total) by mouth 4 (four) times a day (with meals and at bedtime), Starting Thu 7/15/2021, Until Sat 8/14/2021, Normal      topiramate (TOPAMAX) 25 mg tablet Take 1 tablet (25 mg total) by mouth 2 (two) times a day, Starting Wed 9/29/2021, Normal           No discharge procedures on file  PDMP Review     None           ED Provider  Attending physically available and evaluated Ramakrishna Mendes  TERESITA managed the patient along with the ED Attending      Electronically Signed by         Ale Villalobos MD  02/24/22 3861

## 2022-02-21 NOTE — DISCHARGE INSTRUCTIONS
You were seen in the ED for chest pain and asthma  Return to the ED for any worsening symptoms or new symptoms  Follow up with your primary care doctor as soon as possible  Take honey for your cough

## 2022-02-21 NOTE — ED ATTENDING ATTESTATION
2/21/2022  IManfred MD, saw and evaluated the patient  I have discussed the patient with the resident/non-physician practitioner and agree with the resident's/non-physician practitioner's findings, Plan of Care, and MDM as documented in the resident's/non-physician practitioner's note, except where noted  All available labs and Radiology studies were reviewed  I was present for key portions of any procedure(s) performed by the resident/non-physician practitioner and I was immediately available to provide assistance  At this point I agree with the current assessment done in the Emergency Department    I have conducted an independent evaluation of this patient a history and physical is as follows:  Sob with cp for a month   Numbness in left  Arm    Has asthma    No fever no cough  No leg pain or swelling   No n/v/d/f/c    Pain worse today   No h/o dvt or PE      No cardiac risk factors   Exam nad   Neck no jvd  Lungs wheezing mild   Heart rrr no m abd soft pos bs nt nd ext normal     ED Course         Critical Care Time  Procedures

## 2022-02-22 LAB
ATRIAL RATE: 76 BPM
FLUAV RNA RESP QL NAA+PROBE: NEGATIVE
FLUBV RNA RESP QL NAA+PROBE: NEGATIVE
P AXIS: 72 DEGREES
PR INTERVAL: 142 MS
QRS AXIS: 3 DEGREES
QRSD INTERVAL: 86 MS
QT INTERVAL: 378 MS
QTC INTERVAL: 425 MS
SARS-COV-2 RNA RESP QL NAA+PROBE: NEGATIVE
T WAVE AXIS: 44 DEGREES
VENTRICULAR RATE: 76 BPM

## 2022-02-22 PROCEDURE — 93010 ELECTROCARDIOGRAM REPORT: CPT | Performed by: INTERNAL MEDICINE

## 2022-02-28 ENCOUNTER — TELEPHONE (OUTPATIENT)
Dept: NEUROLOGY | Facility: CLINIC | Age: 37
End: 2022-02-28

## 2022-02-28 NOTE — TELEPHONE ENCOUNTER
LMOM for pt to r/s her 3/3 appt with Dr Lilian Evans  Please schedule next availability when pt calls back

## 2022-03-04 NOTE — TELEPHONE ENCOUNTER
3RD ATTEMPT- Trying to reach the patient to let her know that I r/s her appt from Dr Georgette Alberto 03/22/2022 to Hamida Jefferson 04/08/22 @ 9:45am  If pt called please let her know the updated schedule and location as requested KRISTIN

## 2022-04-06 ENCOUNTER — TELEPHONE (OUTPATIENT)
Dept: INTERNAL MEDICINE CLINIC | Facility: CLINIC | Age: 37
End: 2022-04-06

## 2022-04-06 DIAGNOSIS — G93.2 IDIOPATHIC INTRACRANIAL HYPERTENSION: Primary | ICD-10-CM

## 2022-04-06 NOTE — TELEPHONE ENCOUNTER
----- Message from Nina Martinez sent at 4/6/2022  2:07 PM EDT -----  Regarding: PHYS ORD REQUIRED  Please place an Amb  Ref  To [facility]) Neurology  in [address] [place] [address] Rd  Appt   Date: 4/8/2022    Dx : Idiopathic intracranial hypertension    Thank you

## 2022-04-07 ENCOUNTER — TELEPHONE (OUTPATIENT)
Dept: NEUROLOGY | Facility: CLINIC | Age: 37
End: 2022-04-07

## 2022-04-07 NOTE — TELEPHONE ENCOUNTER
Called patient to reschedule her appointment with Trung Ty since provider will not be in on 4/8  Mail box was full so I was unable to leave a message  Appointment is canceled for now

## 2022-04-07 NOTE — TELEPHONE ENCOUNTER
Patient called because she seen we canceled her appointment yet again  She asked to speak to a supervisor because she has been waiting for almost a year for this appointment  She advised that she ubers everywhere locally and does not want to go to [facility] or [place]  I went to my supervisor Nae Erazo and explained to her what the patient was upset for  She is going to reach out to [place]  I advised that patient what steps are being taking and let her know she will be called back

## 2022-04-07 NOTE — TELEPHONE ENCOUNTER
Spoke with patient she has been rescheduled multiple times, took off of work for this appointment and has transportation issues so [place] location is easiest for patient however, we have no provider to see her at that location this day  Offered patient an appt with Ruthann Bray on the same day Friday 04/08 at 12:30 told patient if she could arrange transportation there we can assist and getting her transportation home  Patient was agreeable to entire plan and accepted the appointment

## 2022-04-08 ENCOUNTER — CONSULT (OUTPATIENT)
Dept: NEUROLOGY | Facility: CLINIC | Age: 37
End: 2022-04-08
Payer: COMMERCIAL

## 2022-04-08 VITALS — DIASTOLIC BLOOD PRESSURE: 92 MMHG | SYSTOLIC BLOOD PRESSURE: 137 MMHG | HEART RATE: 73 BPM

## 2022-04-08 DIAGNOSIS — G43.719 INTRACTABLE CHRONIC MIGRAINE WITHOUT AURA AND WITHOUT STATUS MIGRAINOSUS: ICD-10-CM

## 2022-04-08 DIAGNOSIS — Z82.49 FAMILY HISTORY OF BRAIN ANEURYSM: ICD-10-CM

## 2022-04-08 DIAGNOSIS — G93.2 IDIOPATHIC INTRACRANIAL HYPERTENSION: Primary | ICD-10-CM

## 2022-04-08 PROCEDURE — 99204 OFFICE O/P NEW MOD 45 MIN: CPT | Performed by: PHYSICIAN ASSISTANT

## 2022-04-08 RX ORDER — ACETAZOLAMIDE 250 MG/1
TABLET ORAL
Qty: 60 TABLET | Refills: 3 | Status: SHIPPED | OUTPATIENT
Start: 2022-04-08 | End: 2022-07-08 | Stop reason: SDUPTHER

## 2022-04-08 RX ORDER — INDOMETHACIN 25 MG/1
CAPSULE ORAL
Qty: 15 CAPSULE | Refills: 0 | Status: SHIPPED | OUTPATIENT
Start: 2022-04-08

## 2022-04-08 NOTE — PROGRESS NOTES
Patient ID: Steve Brice is a 39 y o  female  Assessment/Plan:     Diagnoses and all orders for this visit:    Idiopathic intracranial hypertension  -     Ambulatory Referral to Neurology  -     Cancel: Ambulatory Referral to Ophthalmology; Future  -     acetaZOLAMIDE (DIAMOX) 250 mg tablet; 1 tab qam x 5 days, then 2 tabs qam  -     Ambulatory Referral to Ophthalmology; Future  -     indomethacin (INDOCIN) 25 mg capsule; 1-2 tabs q8 hours prn severe headache WITH FOOD  -     [place] OUT-patient lumbar puncture; Future    Family history of brain aneurysm  -     CTA head w wo contrast; Future    Intractable chronic migraine without aura and without status migrainosus  -     CTA head w wo contrast; Future  -     TSH, 3rd generation with Free T4 reflex; Future  -     Vitamin D 25 hydroxy; Future  -     Vitamin B12; Future  -     Folate; Future  -     indomethacin (INDOCIN) 25 mg capsule; 1-2 tabs q8 hours prn severe headache WITH FOOD  -     FL OUT-patient lumbar puncture; Future         Migraine headaches, worsening since COVID infection in November 2021  Patient also admits to weight gain which could contribute to increased intracranial hypertension for which I have ordered a lumbar puncture to further characterize/diagnose  A diagnostic/therapeutic high volume tap will be performed, with opening and closing pressures, and in addition I ordered CSF studies as noted (RBC, WBC, Gram stain, glucose, protein, cytology)  Ophthalmology recommended  Will consider repeat brain MRI moving forward with diffusely hypointense marrow signal in the T1 sequence, for which CSF cytology has been added to the lumbar puncture  Due to strong family history of brain aneurysm, CTA head with and without contrast was ordered today  To treat increased intracranial pressure I recommended re-trying Diamox  States in the past she tried it with possible itching although she is not sure if it was a side effect    Did not have any serious allergic reaction  I represcribed a very low dose; if no s/e this time okay to continue to titrate  It will be more effective than topamax and lasix for IICP  She can continue low dose topamax if she would like for weight loss  She is determined to lose weight and would like to potentially see weight management group for this  The patient should not hesitate to call me prior to her follow up with any questions or concerns  This case was personally reviewed with Dr Aby Richard who made recommendations, however she did not see the patient in person  Subjective:    HPI    Ms Sandra Alvarado is a very pleasant 38 yo female who is here for neurological consultation for migraine headaches which have been worsening since COVID infection in November 2020  She holds additional diagnoses of right flank pain but no radiographic evidence of kidney stones, pre diabetes, mild intermittent asthma without complication  I see mention of headaches by her PCP at the visit on 10/12/2020, and the patient was given Flexeril and riboflavin which did reduce the headaches thankfully  She also states that she gained approximately 60 lb over the last several months and she is determined to lose weight  Per PCP's note in November 2020 the "patient has experienced an 80 8 lb weight gain over the course of 8 months "    She then tested + for COVID 11/24/2020  Headaches worsened since then and did not improve  The patient describes her COVID infection as a constant fever for 2 weeks, shortness of breath, difficulty breathing, went to the hospital once for it  She also had chills, aches and pains of the muscles and joints, as well as worsening migraine headaches  The other infectious symptoms improved but the headaches persisted  She is an  for [employer]    There is a lot of stress and a lot of changes going on with her work, so this seems like it could be a trigger for her migraines  Migraines:  Current pain: 4-5/10  Reaches pain level at worst: 10/10  Frequency:  Daily, medications such as Topamax decrease the pain level so she can function  Location:  Occipital, R>L, sometimes R parietal, eyes  Quality:  Pressure, sharp  Associated with: nausea, photophobia, phonophobia, prefer quiet dark room, dizziness, blurry vision, dots in the visual fields, neck pain, sore/ stiff neck    Triggers: stress, especially at work, weather changes/range, using the computer too much    Aura/ warning:  She gets some blurry vision and dots in her visual fields as noted above, but no scintillating scotomas  The headache just comes on or worsens without warning  Medications tried:  Prevention-  Diamox- she was not sure but thinks this caused itchiness  topamax- if takes this daily it brings the headache down to a tolerable level  Flexeril  Riboflavin    Abortive-  Tylenol  Naproxen    Other non-medication therapies or treatments-    Neck pain and description:  Sleep concerns:    Family hx of migraines: unsure  Family hx of cerebral aneurysms: strong faimly history of brain aneurysm including paternal aunt who had an aneurysm rupture; mom's side and maternal GGM as well with brain aneurysms    Diagnostics:  Brain MRI w/o contrast 3/10/21: "Prominent CSF within the optic nerve sheaths identified with prominent partially of the sella turcica  In the setting of headache, consideration for idiopathic intracranial hypertension (pseudotumor cerebri) should be considered  Diffusely hypointense marrow signal in the T1 sequence identified  This is nonspecific however can be seen in the setting of anemia, tobacco abuse, or other marrow infiltrating processes "    The following portions of the patient's history were reviewed and updated as appropriate:   She  has a past medical history of Asthma    She   Patient Active Problem List    Diagnosis Date Noted    Intractable chronic migraine without aura and without status migrainosus 2022    Family history of brain aneurysm 2022    Odynophagia 07/15/2021    Idiopathic intracranial hypertension 07/15/2021    Right flank pain 07/15/2021    COVID-19 2020    Prediabetes 2018    Mild intermittent asthma without complication     SOB (shortness of breath) 2016    Acute asthma exacerbation 2016    Morbid obesity due to excess calories ([address] ) 2016    Chest tightness 2016     She  has a past surgical history that includes  section  Her family history includes Arthritis in her mother; Cancer in her family; Diabetes in her family; Fibromyalgia in her mother; Heart disease in her family; No Known Problems in her other  She  reports that she has never smoked  She has never used smokeless tobacco  She reports that she does not drink alcohol and does not use drugs    Current Outpatient Medications   Medication Sig Dispense Refill    acetaZOLAMIDE (DIAMOX) 250 mg tablet 1 tab qam x 5 days, then 2 tabs qam 60 tablet 3    Advair HFA 45-21 MCG/ACT inhaler INHALE 2 PUFFS 2 (TWO) TIMES A DAY RINSE MOUTH AFTER USE  24 Inhaler 3    albuterol (2 5 mg/3 mL) 0 083 % nebulizer solution TAKE 1 VIAL BY NEBULIZATION EVERY 6 HOURS AS NEEDED FOR WHEEZING OR SHORTNESS OF BREATH 75 mL 1    albuterol (PROVENTIL HFA,VENTOLIN HFA) 90 mcg/act inhaler TAKE 2 PUFFS BY MOUTH EVERY 6 HOURS AS NEEDED FOR WHEEZE 8 5 g 0    cyclobenzaprine (FLEXERIL) 5 mg tablet Take 2 tablets (10 mg total) by mouth daily at bedtime 30 tablet 0    Diclofenac Sodium (VOLTAREN) 1 % APPLY 2 G TOPICALLY 4 (FOUR) TIMES A DAY AS NEEDED (B/L LE PAIN) 100 g 1    ferrous sulfate 324 (65 Fe) mg Take 1 tablet (324 mg total) by mouth every other day 30 tablet 3    fluticasone-salmeterol (Advair) 500-50 mcg/dose inhaler Inhale 1 puff 2 (two) times a day      furosemide (LASIX) 20 mg tablet Take 1 tablet (20 mg total) by mouth daily 30 tablet 2    gabapentin (NEURONTIN) 100 mg capsule Take 1 capsule (100 mg total) by mouth 3 (three) times a day 30 capsule 2    indomethacin (INDOCIN) 25 mg capsule 1-2 tabs q8 hours prn severe headache WITH FOOD 15 capsule 0    Klor-Con M20 20 MEQ tablet TAKE 1 TABLET BY MOUTH EVERY DAY 90 tablet 3    lisinopril (ZESTRIL) 5 mg tablet TAKE 1 TABLET BY MOUTH EVERY DAY 90 tablet 3    pantoprazole (PROTONIX) 40 mg tablet Take 1 tablet (40 mg total) by mouth daily 30 tablet 0    Riboflavin 400 MG TABS Take 1 tablet (400 mg total) by mouth daily 30 tablet 0    sucralfate (CARAFATE) 1 g/10 mL suspension Take 10 mL (1 g total) by mouth 4 (four) times a day (with meals and at bedtime) (Patient not taking: Reported on 9/29/2021) 1200 mL 0    topiramate (TOPAMAX) 25 mg tablet Take 1 tablet (25 mg total) by mouth 2 (two) times a day 30 tablet 2     No current facility-administered medications for this visit  She has No Known Allergies  Objective:    Blood pressure 137/92, pulse 73, not currently breastfeeding  Physical Exam    Neurological Exam  Vital signs reviewed  Well developed, well nourished  Head: Normocephalic, atraumatic  Neck: Neck flexors 5/5  CN 2-12: intact and symmetric, including EOMs which are normal b/l and PERRL  Gross VF testing in the office today is normal t/o  Fundus is slightly blurred to gross testing on the left; right side appears to have a normal fundus exam on gross testing  Pt encouraged to have a dilated eye exam and VF testing  MSK: 5/5 t/o  ROM normal x all 4 extr  No pronator drift  Sensation: Inact to LT x4 extr  Romberg negative  Reflexes: 2+ and symmetric in all 4 extr  Coordination: Nml x4 extr  Gait: Steady normal gait, tandem gait is steady  ROS:    Review of Systems   Constitutional: Negative for chills and fever  HENT: Negative for ear pain and sore throat  Eyes: Positive for photophobia and visual disturbance (blurry vision sometimes)  Negative for pain     Respiratory: Negative for cough and shortness of breath  Cardiovascular: Negative for chest pain and palpitations  Gastrointestinal: Negative for abdominal pain and vomiting  Genitourinary: Negative for dysuria and hematuria  Musculoskeletal: Negative for arthralgias and back pain  Skin: Negative for color change and rash  Neurological: Positive for headaches  Negative for seizures and syncope  Psychiatric/Behavioral: Negative for agitation, behavioral problems and confusion  The patient is nervous/anxious  All other systems reviewed and are negative  The following portions of the patient's history were reviewed and updated as appropriate: allergies, current medications/ medication history, past family history, past medical history, past social history, past surgical history and problem list     Review of systems was reviewed and otherwise unremarkable from a neurological perspective  I have spent 45 minutes with the patient today in which greater than 50% of this time was spent in counseling/coordination of care regarding diagnoses, test results, impression, plan and potential medication side effects

## 2022-04-12 NOTE — NURSING NOTE
Called patient to complete consult and go over instructions, patient is scheduled on 4/19/22   for diagnostic lumbar puncture  Verified allergies and no current anticoagulant medication present  Diet, medication instructions (taking own meds prior to test), also went over with patient that as of today with hold any ASA or ASA products till the date of the procedure and need for  was explained  Also went over instructions of location, time and date of procedure, of location and time ambulatory procedure unit  Also reviewed the procedure itself and answered any questions  Explained also post recovery instructions  Patient made aware that she may call if any other questions that may arise to the myself, gave them my contact information

## 2022-04-19 ENCOUNTER — HOSPITAL ENCOUNTER (OUTPATIENT)
Dept: RADIOLOGY | Facility: HOSPITAL | Age: 37
Discharge: HOME | End: 2022-04-19
Admitting: RADIOLOGY
Payer: COMMERCIAL

## 2022-04-19 VITALS
HEART RATE: 97 BPM | DIASTOLIC BLOOD PRESSURE: 99 MMHG | TEMPERATURE: 97.2 F | SYSTOLIC BLOOD PRESSURE: 143 MMHG | OXYGEN SATURATION: 96 % | RESPIRATION RATE: 20 BRPM

## 2022-04-19 DIAGNOSIS — G93.2 IDIOPATHIC INTRACRANIAL HYPERTENSION: ICD-10-CM

## 2022-04-19 DIAGNOSIS — G43.719 INTRACTABLE CHRONIC MIGRAINE WITHOUT AURA AND WITHOUT STATUS MIGRAINOSUS: ICD-10-CM

## 2022-04-19 LAB
APPEARANCE CSF: NORMAL
GLUCOSE CSF-MCNC: 74 MG/DL (ref 50–80)
GRAM STN SPEC: NORMAL
INR PPP: 1 (ref 0.84–1.19)
LYMPHOCYTES NFR CSF MANUAL: 88 %
MONOS+MACROS CSF MANUAL: 12 %
PLATELET # BLD AUTO: 388 THOUSANDS/UL (ref 149–390)
PMV BLD AUTO: 9.8 FL (ref 8.9–12.7)
PROT CSF-MCNC: 27 MG/DL (ref 15–45)
PROTHROMBIN TIME: 12.8 SECONDS (ref 11.6–14.5)
RBC # CSF MANUAL: 15 UL (ref 0–10)
TOTAL CELLS COUNTED BLD: NO
TOTAL CELLS COUNTED SPEC: 26
TUBE # CSF: 4
WBC # CSF AUTO: 2 /UL (ref 0–5)

## 2022-04-19 PROCEDURE — 88108 CYTOPATH CONCENTRATE TECH: CPT | Performed by: PATHOLOGY

## 2022-04-19 PROCEDURE — 62328 DX LMBR SPI PNXR W/FLUOR/CT: CPT

## 2022-04-19 PROCEDURE — 84157 ASSAY OF PROTEIN OTHER: CPT | Performed by: PHYSICIAN ASSISTANT

## 2022-04-19 PROCEDURE — 85049 AUTOMATED PLATELET COUNT: CPT | Performed by: PHYSICIAN ASSISTANT

## 2022-04-19 PROCEDURE — 89051 BODY FLUID CELL COUNT: CPT | Performed by: PHYSICIAN ASSISTANT

## 2022-04-19 PROCEDURE — 82945 GLUCOSE OTHER FLUID: CPT | Performed by: PHYSICIAN ASSISTANT

## 2022-04-19 PROCEDURE — 85610 PROTHROMBIN TIME: CPT | Performed by: PHYSICIAN ASSISTANT

## 2022-04-19 PROCEDURE — 89050 BODY FLUID CELL COUNT: CPT | Performed by: PHYSICIAN ASSISTANT

## 2022-04-19 RX ORDER — LIDOCAINE HYDROCHLORIDE 10 MG/ML
5 INJECTION, SOLUTION EPIDURAL; INFILTRATION; INTRACAUDAL; PERINEURAL
Status: COMPLETED | OUTPATIENT
Start: 2022-04-19 | End: 2022-04-19

## 2022-04-19 RX ORDER — ACETAMINOPHEN 325 MG/1
975 TABLET ORAL ONCE
Status: COMPLETED | OUTPATIENT
Start: 2022-04-19 | End: 2022-04-19

## 2022-04-19 RX ADMIN — LIDOCAINE HYDROCHLORIDE 5 ML: 10 INJECTION, SOLUTION EPIDURAL; INFILTRATION; INTRACAUDAL; PERINEURAL at 14:35

## 2022-04-19 RX ADMIN — ACETAMINOPHEN 975 MG: 325 TABLET ORAL at 15:40

## 2022-04-19 NOTE — DISCHARGE INSTRUCTIONS
Lumbar Puncture   WHAT YOU NEED TO KNOW:   A lumbar puncture is a procedure used to collect cerebrospinal fluid (CSF)  CSF is a clear, protective fluid that flows around the brain and inside the spinal canal  A lumbar puncture is usually done to check for an infection, inflammation, bleeding, or other conditions that affect the brain  It may also be done to remove CSF to reduce pressure in the brain  DISCHARGE INSTRUCTIONS:   Seek care immediately if:   · You have a severe headache that does not get better after you lie down  · You have a fever  · You have a stiff neck or trouble thinking clearly  · Your legs, feet, or other parts below the waist feel numb, tingly, or weak  · You have bleeding or a discharge coming from the area where the needle was put into your back  · You have severe pain in your back or neck  Call your doctor if:   · You have questions or concerns about your condition or care  Medicines: You may need any of the following:  · Acetaminophen  decreases pain and fever  It is available without a doctor's order  Ask how much to take and how often to take it  Follow directions  Read the labels of all other medicines you are using to see if they also contain acetaminophen, or ask your doctor or pharmacist  Acetaminophen can cause liver damage if not taken correctly  Do not use more than 4 grams (4,000 milligrams) total of acetaminophen in one day  · NSAIDs  help decrease swelling and pain or fever  This medicine is available with or without a doctor's order  NSAIDs can cause stomach bleeding or kidney problems in certain people  If you take blood thinner medicine, always ask your healthcare provider if NSAIDs are safe for you  Always read the medicine label and follow directions  · Prescription pain medicine  may be given  Ask your healthcare provider how to take this medicine safely  Some prescription pain medicines contain acetaminophen   Do not take other medicines that contain acetaminophen without talking to your healthcare provider  Too much acetaminophen may cause liver damage  Prescription pain medicine may cause constipation  Ask your healthcare provider how to prevent or treat constipation  · Take your medicine as directed  Contact your healthcare provider if you think your medicine is not helping or if you have side effects  Tell him or her if you are allergic to any medicine  Keep a list of the medicines, vitamins, and herbs you take  Include the amounts, and when and why you take them  Bring the list or the pill bottles to follow-up visits  Carry your medicine list with you in case of an emergency  Care for a post-lumbar puncture headache: You may develop a headache during the first few hours after your procedure that may last for several days  The headache may be mild to severe and may get worse when you sit or stand  The following may help ease a post-lumbar puncture headache:  · Drink more liquid  than usual after your lumbar puncture  Ask how much liquid is right for you  Caffeine may be used to treat a headache  Drinks such as coffee, tea, or some soft drinks have caffeine  Caffeine is also available over the counter in tablet form  Ask about using caffeine to treat your headache  Do not drink alcohol  Alcohol can make your headache worse  · Lie down  or rest to ease your headache pain  Follow up with your healthcare provider as directed:  Write down your questions so you remember to ask them during your visits  © Copyright [employer] 2022 Information is for End User's use only and may not be sold, redistributed or otherwise used for commercial purposes  All illustrations and images included in CareNotes® are the copyrighted property of [employer]  or Ruth Mukherjee   The above information is an  only  It is not intended as medical advice for individual conditions or treatments   Talk to your doctor, nurse or pharmacist before following any medical regimen to see if it is safe and effective for you

## 2022-04-22 ENCOUNTER — TELEPHONE (OUTPATIENT)
Dept: RADIOLOGY | Facility: HOSPITAL | Age: 37
End: 2022-04-22

## 2022-04-22 NOTE — NURSING NOTE
Follow up phone call made, patient had lumbar puncture procedure  Offering complaint of slight headache and soreness still persisting  from the procedure  Per patient "nothing crazy, it is tolerable "  Encouraged patient to continue post discharge orders of  plenty of hydration and analgesics if necessary for discomfort  Patient  aware to call if any questions or symptoms arise to radiology RN  Verbalized understanding by giving affirmative response

## 2022-04-29 ENCOUNTER — TELEPHONE (OUTPATIENT)
Dept: NEUROLOGY | Facility: CLINIC | Age: 37
End: 2022-04-29

## 2022-04-29 NOTE — TELEPHONE ENCOUNTER
Called and spoke to the patient about insurance denying the CTA of the head  Patient does not have a "first degree" family member who had an aneurysm  Family members that were effected were her maternal grandmother and an her father's sister  I offered that peer to peer was completed and that she could reach out to her insurance to initiate an appeal   Patient would like advice on what the next best course should be  She offered that after the lumbar puncture she is having increased headaches and is light headed and dizzy at times

## 2022-05-04 NOTE — TELEPHONE ENCOUNTER
Late entry- this past Monday, I did contact the pt and LVM about this and her results  Will try to call her again this week

## 2022-06-16 ENCOUNTER — APPOINTMENT (EMERGENCY)
Dept: RADIOLOGY | Facility: HOSPITAL | Age: 37
End: 2022-06-16
Payer: COMMERCIAL

## 2022-06-16 ENCOUNTER — HOSPITAL ENCOUNTER (EMERGENCY)
Facility: HOSPITAL | Age: 37
Discharge: HOME | End: 2022-06-16
Attending: EMERGENCY MEDICINE
Payer: COMMERCIAL

## 2022-06-16 VITALS
TEMPERATURE: 98.9 F | RESPIRATION RATE: 18 BRPM | SYSTOLIC BLOOD PRESSURE: 143 MMHG | OXYGEN SATURATION: 97 % | HEART RATE: 82 BPM | DIASTOLIC BLOOD PRESSURE: 88 MMHG

## 2022-06-16 DIAGNOSIS — J45.909 BRONCHITIS WITH ASTHMA, ACUTE: ICD-10-CM

## 2022-06-16 DIAGNOSIS — J20.9 BRONCHITIS WITH ASTHMA, ACUTE: ICD-10-CM

## 2022-06-16 DIAGNOSIS — J45.901 ASTHMA EXACERBATION: Primary | ICD-10-CM

## 2022-06-16 LAB
FLUAV RNA RESP QL NAA+PROBE: NEGATIVE
FLUBV RNA RESP QL NAA+PROBE: NEGATIVE
RSV RNA RESP QL NAA+PROBE: NEGATIVE
SARS-COV-2 RNA RESP QL NAA+PROBE: NEGATIVE

## 2022-06-16 PROCEDURE — 99284 EMERGENCY DEPT VISIT MOD MDM: CPT | Performed by: PHYSICIAN ASSISTANT

## 2022-06-16 PROCEDURE — 0241U HB NFCT DS VIR RESP RNA 4 TRGT: CPT | Performed by: PHYSICIAN ASSISTANT

## 2022-06-16 PROCEDURE — 99285 EMERGENCY DEPT VISIT HI MDM: CPT

## 2022-06-16 PROCEDURE — 71045 X-RAY EXAM CHEST 1 VIEW: CPT

## 2022-06-16 PROCEDURE — 94640 AIRWAY INHALATION TREATMENT: CPT

## 2022-06-16 RX ORDER — PREDNISONE 20 MG/1
60 TABLET ORAL ONCE
Status: COMPLETED | OUTPATIENT
Start: 2022-06-16 | End: 2022-06-16

## 2022-06-16 RX ORDER — IPRATROPIUM BROMIDE AND ALBUTEROL SULFATE 2.5; .5 MG/3ML; MG/3ML
3 SOLUTION RESPIRATORY (INHALATION) ONCE
Status: COMPLETED | OUTPATIENT
Start: 2022-06-16 | End: 2022-06-16

## 2022-06-16 RX ORDER — PREDNISONE 20 MG/1
60 TABLET ORAL DAILY
Qty: 12 TABLET | Refills: 0 | Status: SHIPPED | OUTPATIENT
Start: 2022-06-16 | End: 2022-06-20

## 2022-06-16 RX ORDER — AZITHROMYCIN 250 MG/1
TABLET, FILM COATED ORAL
Qty: 6 TABLET | Refills: 0 | Status: SHIPPED | OUTPATIENT
Start: 2022-06-16 | End: 2022-06-20

## 2022-06-16 RX ADMIN — IPRATROPIUM BROMIDE AND ALBUTEROL SULFATE 3 ML: 2.5; .5 SOLUTION RESPIRATORY (INHALATION) at 11:53

## 2022-06-16 RX ADMIN — PREDNISONE 60 MG: 20 TABLET ORAL at 11:53

## 2022-06-16 RX ADMIN — IPRATROPIUM BROMIDE AND ALBUTEROL SULFATE 3 ML: 2.5; .5 SOLUTION RESPIRATORY (INHALATION) at 13:09

## 2022-06-16 NOTE — ED PROVIDER NOTES
History  Chief Complaint   Patient presents with    Shortness of Breath     Cough, sob for 4 days, cp from cough, chills     39 y o  female presents for evaluation of cough, congestion, chest tightness, wheezing, SOB, subjective chills x 4 days  Notes child at home with similar symptoms, negative rapid COVID test for her and child  +PMH of asthma requiring steroids, and 1 hospitalization, denies intubation previously  Takes advair daily and rescue MDI prn  States minimal relief from rescue inhaler over last 4 days  Denies fever, chills, N/V/D, CP, HA, blurry vision, known sick contacts, known COVID exposures  Prior to Admission Medications   Prescriptions Last Dose Informant Patient Reported? Taking? Advair HFA 45-21 MCG/ACT inhaler   No No   Sig: INHALE 2 PUFFS 2 (TWO) TIMES A DAY RINSE MOUTH AFTER USE     Diclofenac Sodium (VOLTAREN) 1 %   No No   Sig: APPLY 2 G TOPICALLY 4 (FOUR) TIMES A DAY AS NEEDED (B/L LE PAIN)   Klor-Con M20 20 MEQ tablet   No No   Sig: TAKE 1 TABLET BY MOUTH EVERY DAY   Riboflavin 400 MG TABS   No No   Sig: Take 1 tablet (400 mg total) by mouth daily   acetaZOLAMIDE (DIAMOX) 250 mg tablet   No No   Si tab qam x 5 days, then 2 tabs qam   albuterol (2 5 mg/3 mL) 0 083 % nebulizer solution   No No   Sig: TAKE 1 VIAL BY NEBULIZATION EVERY 6 HOURS AS NEEDED FOR WHEEZING OR SHORTNESS OF BREATH   albuterol (PROVENTIL HFA,VENTOLIN HFA) 90 mcg/act inhaler   No No   Sig: TAKE 2 PUFFS BY MOUTH EVERY 6 HOURS AS NEEDED FOR WHEEZE   cyclobenzaprine (FLEXERIL) 5 mg tablet   No No   Sig: Take 2 tablets (10 mg total) by mouth daily at bedtime   ferrous sulfate 324 (65 Fe) mg   No No   Sig: Take 1 tablet (324 mg total) by mouth every other day   fluticasone-salmeterol (Advair) 500-50 mcg/dose inhaler   Yes No   Sig: Inhale 1 puff 2 (two) times a day   furosemide (LASIX) 20 mg tablet   No No   Sig: Take 1 tablet (20 mg total) by mouth daily   gabapentin (NEURONTIN) 100 mg capsule   No No Sig: Take 1 capsule (100 mg total) by mouth 3 (three) times a day   indomethacin (INDOCIN) 25 mg capsule   No No   Si-2 tabs q8 hours prn severe headache WITH FOOD   lisinopril (ZESTRIL) 5 mg tablet   No No   Sig: TAKE 1 TABLET BY MOUTH EVERY DAY   pantoprazole (PROTONIX) 40 mg tablet   No No   Sig: Take 1 tablet (40 mg total) by mouth daily   sucralfate (CARAFATE) 1 g/10 mL suspension   No No   Sig: Take 10 mL (1 g total) by mouth 4 (four) times a day (with meals and at bedtime)   Patient not taking: Reported on 2021   topiramate (TOPAMAX) 25 mg tablet   No No   Sig: Take 1 tablet (25 mg total) by mouth 2 (two) times a day      Facility-Administered Medications: None       Past Medical History:   Diagnosis Date    Asthma        Past Surgical History:   Procedure Laterality Date     SECTION      FL LUMBAR PUNCTURE DIAGNOSTIC  2022       Family History   Problem Relation Age of Onset    No Known Problems Other     Arthritis Mother     Fibromyalgia Mother     Diabetes Family     Cancer Family     Heart disease Family      I have reviewed and agree with the history as documented  E-Cigarette/Vaping    E-Cigarette Use Never User      E-Cigarette/Vaping Substances    Nicotine No     THC No     CBD No     Flavoring No     Other No     Unknown No      Social History     Tobacco Use    Smoking status: Never Smoker    Smokeless tobacco: Never Used   Vaping Use    Vaping Use: Never used   Substance Use Topics    Alcohol use: No    Drug use: No       Review of Systems   HENT: Positive for congestion, postnasal drip, rhinorrhea, sinus pressure and sinus pain  Respiratory: Positive for cough, chest tightness, shortness of breath and wheezing  All other systems reviewed and are negative  Physical Exam  Physical Exam  Vitals and nursing note reviewed  Constitutional:       Appearance: She is well-developed and normal weight     HENT:      Head: Normocephalic and atraumatic  Right Ear: Tympanic membrane, ear canal and external ear normal       Left Ear: Tympanic membrane, ear canal and external ear normal       Nose: Congestion and rhinorrhea present  Mouth/Throat:      Mouth: Mucous membranes are moist       Pharynx: No pharyngeal swelling or oropharyngeal exudate  Eyes:      Extraocular Movements: Extraocular movements intact  Conjunctiva/sclera: Conjunctivae normal    Cardiovascular:      Rate and Rhythm: Normal rate and regular rhythm  Pulses: Normal pulses  Heart sounds: Normal heart sounds  Pulmonary:      Effort: Accessory muscle usage and prolonged expiration present  Breath sounds: Examination of the right-upper field reveals wheezing  Examination of the left-upper field reveals wheezing  Examination of the right-middle field reveals wheezing  Examination of the left-middle field reveals wheezing  Examination of the right-lower field reveals wheezing  Examination of the left-lower field reveals wheezing  Wheezing present  Abdominal:      General: Abdomen is flat  Bowel sounds are normal       Palpations: Abdomen is soft  Musculoskeletal:         General: Normal range of motion  Cervical back: Normal range of motion and neck supple  Skin:     General: Skin is warm  Capillary Refill: Capillary refill takes less than 2 seconds  Neurological:      General: No focal deficit present  Mental Status: She is alert and oriented to person, place, and time     Psychiatric:         Mood and Affect: Mood normal          Behavior: Behavior normal          Vital Signs  ED Triage Vitals   Temperature Pulse Respirations Blood Pressure SpO2   06/16/22 1116 06/16/22 1116 06/16/22 1116 06/16/22 1116 06/16/22 1116   98 9 °F (37 2 °C) 77 18 155/80 100 %      Temp Source Heart Rate Source Patient Position - Orthostatic VS BP Location FiO2 (%)   06/16/22 1116 06/16/22 1116 06/16/22 1330 06/16/22 1330 --   Oral Monitor Sitting Right arm Pain Score       --                  Vitals:    06/16/22 1116 06/16/22 1330   BP: 155/80 143/88   Pulse: 77 82   Patient Position - Orthostatic VS:  Sitting         Visual Acuity      ED Medications  Medications   ipratropium-albuterol (DUO-NEB) 0 5-2 5 mg/3 mL inhalation solution 3 mL (3 mL Nebulization Given 6/16/22 1153)   predniSONE tablet 60 mg (60 mg Oral Given 6/16/22 1153)   ipratropium-albuterol (DUO-NEB) 0 5-2 5 mg/3 mL inhalation solution 3 mL (3 mL Nebulization Given 6/16/22 1309)       Diagnostic Studies  Results Reviewed     Procedure Component Value Units Date/Time    COVID/FLU/RSV [[id number]]  (Normal) Collected: 06/16/22 1153    Lab Status: Final result Specimen: Nares from Nose Updated: 06/16/22 1259     SARS-CoV-2 Negative     INFLUENZA A PCR Negative     INFLUENZA B PCR Negative     RSV PCR Negative    Narrative:      FOR PEDIATRIC PATIENTS - copy/paste COVID Guidelines URL to browser: [email or web address]  [employer]    SARS-CoV-2 assay is a Nucleic Acid Amplification assay intended for the  qualitative detection of nucleic acid from SARS-CoV-2 in nasopharyngeal  swabs  Results are for the presumptive identification of SARS-CoV-2 RNA  Positive results are indicative of infection with SARS-CoV-2, the virus  causing COVID-19, but do not rule out bacterial infection or co-infection  with other viruses  Laboratories within the [place] and its  territories are required to report all positive results to the appropriate  public health authorities  Negative results do not preclude SARS-CoV-2  infection and should not be used as the sole basis for treatment or other  patient management decisions  Negative results must be combined with  clinical observations, patient history, and epidemiological information  This test has not been FDA cleared or approved  This test has been authorized by FDA under an Emergency Use Authorization  (EUA)   This test is only authorized for the duration of time the  declaration that circumstances exist justifying the authorization of the  emergency use of an in vitro diagnostic tests for detection of SARS-CoV-2  virus and/or diagnosis of COVID-19 infection under section 564(b)(1) of  the Act, 21 U  S C  036FKC-7(Z)(2), unless the authorization is terminated  or revoked sooner  The test has been validated but independent review by FDA  and CLIA is pending  Test performed using [employer] Gene[employer]pert: This RT-PCR assay targets N2,  a region unique to SARS-CoV-2  A conserved region in the E-gene was chosen  for pan-Sarbecovirus detection which includes SARS-CoV-2  XR chest 1 view portable   Final Result by Alvino Melissa MD (06/16 1257)      No acute cardiopulmonary disease  Workstation performed: [id number]                    Procedures  Procedures         ED Course                               SBIRT 20yo+    Flowsheet Row Most Recent Value   SBIRT (25 yo +)    In order to provide better care to our patients, we are screening all of our patients for alcohol and drug use  Would it be okay to ask you these screening questions? Yes Filed at: 06/16/2022 1139   Initial Alcohol Screen: US AUDIT-C     1  How often do you have a drink containing alcohol? 0 Filed at: 06/16/2022 1139   2  How many drinks containing alcohol do you have on a typical day you are drinking? 0 Filed at: 06/16/2022 1139   3b  FEMALE Any Age, or MALE 65+: How often do you have 4 or more drinks on one occassion? 0 Filed at: 06/16/2022 1139   Audit-C Score 0 Filed at: 06/16/2022 1139   LETY: How many times in the past year have you    Used an illegal drug or used a prescription medication for non-medical reasons?  Never Filed at: 06/16/2022 1139                    MDM  Number of Diagnoses or Management Options  Asthma exacerbation  Bronchitis with asthma, acute  Diagnosis management comments: Diff dx includes but not limited to bronchitis, asthma exacerbation, viral URI, COVID, FLU, RSV, pneumonia, sinusitis,   Will give prednisone, duoneb as pt is diffusely wheezing and mild tachypnea on exam,   Pt feeling better after duoneb, still with diffuse wheezing will give second duoneb  Pt feeling much better, CXR with no focal/acute findings  COVID/FLU/RSV negative,   Discussed results with pt will dc with rx for prednisone and zpak for asthmatic bronchitis, follow up with PCP and specialist  Return if worsening symptoms or development of fever chills, nausea, vomiting or diarrhea      Disposition  Final diagnoses:   Asthma exacerbation   Bronchitis with asthma, acute     Time reflects when diagnosis was documented in both MDM as applicable and the Disposition within this note     Time User Action Codes Description Comment    6/16/2022  1:13 PM David Gold [J45 [id number]] Asthma exacerbation     6/16/2022  1:16 PM Nickolas Kimble [J20 [id number],  J45 [id number]] Bronchitis with asthma, acute       ED Disposition     ED Disposition   Discharge    Condition   Stable    Date/Time   Thu Jun 16, 2022  1:12 PM    Comment   [address] discharge to home/self care  Follow-up Information     Follow up With Specialties Details Why Contact Info Additional Information    [employer]    [address] [place] [address] Pulmonology   [address] [id number]  [address] [place] [address],  Patrick Choudhary [facility], [address] [address] [facility] [address]    Genia Skiff, DO Allergy   [address]             Discharge Medication List as of 6/16/2022  1:22 PM      START taking these medications    Details   azithromycin (ZITHROMAX) 250 mg tablet Take 2 tablets today then 1 tablet daily x 4 days, Normal      predniSONE 20 mg tablet Take 3 tablets (60 mg total) by mouth daily for 4 days, Starting Thu 6/16/2022, Until Mon 6/20/2022, Normal         CONTINUE these medications which have NOT CHANGED    Details   acetaZOLAMIDE (DIAMOX) 250 mg tablet 1 tab qam x 5 days, then 2 tabs qam, Normal      Advair HFA 45-21 MCG/ACT inhaler INHALE 2 PUFFS 2 (TWO) TIMES A DAY RINSE MOUTH AFTER USE , Starting Tue 3/2/2021, Normal      albuterol (2 5 mg/3 mL) 0 083 % nebulizer solution TAKE 1 VIAL BY NEBULIZATION EVERY 6 HOURS AS NEEDED FOR WHEEZING OR SHORTNESS OF BREATH, Normal      albuterol (PROVENTIL HFA,VENTOLIN HFA) 90 mcg/act inhaler TAKE 2 PUFFS BY MOUTH EVERY 6 HOURS AS NEEDED FOR WHEEZE, Normal      cyclobenzaprine (FLEXERIL) 5 mg tablet Take 2 tablets (10 mg total) by mouth daily at bedtime, Starting Tue 4/6/2021, Normal      Diclofenac Sodium (VOLTAREN) 1 % APPLY 2 G TOPICALLY 4 (FOUR) TIMES A DAY AS NEEDED (B/L LE PAIN), Starting Fri 10/8/2021, Normal      ferrous sulfate 324 (65 Fe) mg Take 1 tablet (324 mg total) by mouth every other day, Starting Wed 9/29/2021, Normal      fluticasone-salmeterol (Advair) 500-50 mcg/dose inhaler Inhale 1 puff 2 (two) times a day, Historical Med      furosemide (LASIX) 20 mg tablet Take 1 tablet (20 mg total) by mouth daily, Starting Wed 9/29/2021, Normal      gabapentin (NEURONTIN) 100 mg capsule Take 1 capsule (100 mg total) by mouth 3 (three) times a day, Starting Wed 9/29/2021, Normal      indomethacin (INDOCIN) 25 mg capsule 1-2 tabs q8 hours prn severe headache WITH FOOD, Normal      Klor-Con M20 20 MEQ tablet TAKE 1 TABLET BY MOUTH EVERY DAY, Normal      lisinopril (ZESTRIL) 5 mg tablet TAKE 1 TABLET BY MOUTH EVERY DAY, Normal      pantoprazole (PROTONIX) 40 mg tablet Take 1 tablet (40 mg total) by mouth daily, Starting Wed 9/29/2021, Until Fri 10/29/2021, Normal      Riboflavin 400 MG TABS Take 1 tablet (400 mg total) by mouth daily, Starting Tue 4/6/2021, Normal      sucralfate (CARAFATE) 1 g/10 mL suspension Take 10 mL (1 g total) by mouth 4 (four) times a day (with meals and at bedtime), Starting Thu 7/15/2021, Until Sat 8/14/2021, Normal      topiramate (TOPAMAX) 25 mg tablet Take 1 tablet (25 mg total) by mouth 2 (two) times a day, Starting Wed 9/29/2021, Normal             No discharge procedures on file      PDMP Review     None          ED Provider  Electronically Signed by           Antoinette Aaron PA-C  06/16/22 3407

## 2022-06-16 NOTE — Clinical Note
Jami Cosby was seen and treated in our emergency department on 6/16/2022  Diagnosis:     Cindy  may return to school on return date  She may return on this date: 06/20/2022         If you have any questions or concerns, please don't hesitate to call        Susanne Machuca PA-C    ______________________________           _______________          _______________  Hospital Representative                              Date                                Time

## 2022-06-16 NOTE — DISCHARGE INSTRUCTIONS
Return to ER if worsening symptoms, follow up with PCP in 2-3 days  Call make an appointment with specialist for further evaluation or treatement

## 2022-06-21 ENCOUNTER — TELEPHONE (OUTPATIENT)
Dept: INTERNAL MEDICINE CLINIC | Facility: CLINIC | Age: 37
End: 2022-06-21

## 2022-06-21 NOTE — TELEPHONE ENCOUNTER
Called patient to gather more information about why the letter is needed,  no answer and unable to leave message  Please review and advise as you're covering for Dr Igor Whitfield

## 2022-06-21 NOTE — TELEPHONE ENCOUNTER
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

## 2022-07-08 ENCOUNTER — TELEMEDICINE (OUTPATIENT)
Dept: NEUROLOGY | Facility: CLINIC | Age: 37
End: 2022-07-08
Payer: COMMERCIAL

## 2022-07-08 DIAGNOSIS — G43.719 INTRACTABLE CHRONIC MIGRAINE WITHOUT AURA AND WITHOUT STATUS MIGRAINOSUS: ICD-10-CM

## 2022-07-08 DIAGNOSIS — G93.2 IIH (IDIOPATHIC INTRACRANIAL HYPERTENSION): ICD-10-CM

## 2022-07-08 DIAGNOSIS — Z82.49 FAMILY HISTORY OF BRAIN ANEURYSM: ICD-10-CM

## 2022-07-08 DIAGNOSIS — R51.9 CHRONIC INTRACTABLE HEADACHE, UNSPECIFIED HEADACHE TYPE: ICD-10-CM

## 2022-07-08 DIAGNOSIS — G89.29 CHRONIC INTRACTABLE HEADACHE, UNSPECIFIED HEADACHE TYPE: ICD-10-CM

## 2022-07-08 DIAGNOSIS — G93.2 IDIOPATHIC INTRACRANIAL HYPERTENSION: Primary | ICD-10-CM

## 2022-07-08 PROCEDURE — 99214 OFFICE O/P EST MOD 30 MIN: CPT | Performed by: PHYSICIAN ASSISTANT

## 2022-07-08 RX ORDER — ACETAZOLAMIDE 250 MG/1
TABLET ORAL
Qty: 60 TABLET | Refills: 5 | Status: SHIPPED | OUTPATIENT
Start: 2022-07-08

## 2022-07-08 RX ORDER — TOPIRAMATE 50 MG/1
TABLET, FILM COATED ORAL
Qty: 60 TABLET | Refills: 5 | Status: SHIPPED | OUTPATIENT
Start: 2022-07-08

## 2022-07-08 NOTE — PROGRESS NOTES
Virtual Regular Visit    Verification of patient location:    Patient is located in the following state in which I hold an active license [place]    Assessment/Plan:    Problem List Items Addressed This Visit        Cardiovascular and Mediastinum    Intractable chronic migraine without aura and without status migrainosus    Relevant Medications    topiramate (TOPAMAX) 50 MG tablet    Other Relevant Orders    MRI brain with and without contrast    Family history of brain aneurysm       Nervous and Auditory    Idiopathic intracranial hypertension - Primary    Relevant Medications    acetaZOLAMIDE (DIAMOX) 250 mg tablet    Other Relevant Orders    Ambulatory Referral to Ophthalmology    MRI brain with and without contrast    IIH (idiopathic intracranial hypertension)    Relevant Medications    topiramate (TOPAMAX) 50 MG tablet       Other    Chronic intractable headache    Relevant Medications    topiramate (TOPAMAX) 50 MG tablet    Other Relevant Orders    MRI brain with and without contrast        Ms Cindy Laguerre reports a reduction of migraine headaches with a combination of Diamox and Topamax  We added Diamox for increased intracranial pressure (Opening pressure of 26, closing pressure of 12)  She will titrate Topamax to a total of 100 mg q h s  at this time, side effects reviewed  She is not planning pregnancy since Topamax is teratogenic  This was titrated to further reduce residual headaches  She can use Advil as needed but does not use this more than 2-3 times per week  Repeat brain MRI with and without contrast to re-evaluate findings on last brain MRI diffusely hypointense marrow signal in the T1 sequence " CSF cytology is negative  Consider CTA head in the future due to family history of aneurysms, this was denied by her insurance last time due to no first degree relative history  Pt reminded to f/u with eye dr asap  I can provide one if needed  R/o papilledema and monitor      The patient should not hesitate to call me prior to her follow up with any questions or concerns  Reason for visit is   Chief Complaint   Patient presents with    Virtual Regular Visit        Encounter provider Danyell Olea PA-C    Provider located at [facility]  [address] [facility] [address] [facility]  [phone]      Recent Visits  No visits were found meeting these conditions  Showing recent visits within past 7 days and meeting all other requirements  Future Appointments  No visits were found meeting these conditions  Showing future appointments within next 150 days and meeting all other requirements       The patient was identified by name and date of birth  Jayshree Delgado was informed that this is a telemedicine visit and that the visit is being conducted through [address] and patient was informed this is a secure, HIPAA-complaint platform  She agrees to proceed     My office door was closed  No one else was in the room  She acknowledged consent and understanding of privacy and security of the video platform  The patient has agreed to participate and understands they can discontinue the visit at any time  Patient is aware this is a billable service  Franki Membreno is a 40 y o  female who is contacted via telemedicine for neurological follow-up  HPI     CTA head with and without contrast was ordered at the last visit but denied by insurance  Was ordered for a family history of brain aneurysms  Will reconsider ordering this in the future  The patient had a lumbar puncture 4/19/2022 with an opening pressure of 26, closing pressure 12  She reports reduction of migraine headaches after this procedure  Initially however she had headaches for 3 days following the procedure which was likely a low-pressure headache  She now feels her headaches are more manageable and tolerable    She can function better at work  She is not missing any events because of her headaches  She denies vision changes  Work and stress seem to be the biggest triggers for her  However she is not overusing Advil  She tolerates the headaches without taking anything  She does describe some hair loss but it is not significant  She is interested in trying to lose weight in the future  Migraines:  Current pain: 4/10  Reaches pain level at worst: 10/10  Frequency:  as of 7/8/22: Daily low grade headache, less severe and tolerable with the current regimen; she is happier and able to function better  Location:  Occipital, R>L, sometimes R parietal, eyes  Quality:  Pressure, sharp  Associated with: nausea, photophobia, phonophobia, prefer quiet dark room, dizziness, blurry vision, dots in the visual fields, neck pain, sore/ stiff neck     Triggers: stress, especially at work, weather changes/range, using the computer too much     Aura/ warning:  She gets some blurry vision and dots in her visual fields as noted above, but no scintillating scotomas  The headache just comes on or worsens without warning      Medications tried:  Prevention-  Diamox- second time she tried it she did not have s/e  topamax- if takes this daily it brings the headache down to a tolerable level  Flexeril  Riboflavin    Abortive-  Tylenol  Naproxen  advil    Other non-medication therapies or treatments-    Prior note 4/8/22:  Migraine headaches which have been worsening since COVID infection in November 2020      She holds additional diagnoses of right flank pain but no radiographic evidence of kidney stones, pre diabetes, mild intermittent asthma without complication      I see mention of headaches by her PCP at the visit on 10/12/2020, and the patient was given Flexeril and riboflavin which did reduce the headaches thankfully      She also states that she gained approximately 60 lb over the last several months and she is determined to lose weight    Per PCP's note in 2020 the "patient has experienced an 80 8 lb weight gain over the course of 8 months "     She then tested + for COVID 2020  Headaches worsened since then and did not improve  The patient describes her COVID infection as a constant fever for 2 weeks, shortness of breath, difficulty breathing, went to the hospital once for it  She also had chills, aches and pains of the muscles and joints, as well as worsening migraine headaches  The other infectious symptoms improved but the headaches persisted      She is an  for [employer]  There is a lot of stress and a lot of changes going on with her work, so this seems like it could be a trigger for her migraines      Family hx of migraines: unsure  Family hx of cerebral aneurysms: strong faimly history of brain aneurysm including paternal aunt who had an aneurysm rupture; mom's side and maternal GGM (on both sides) as well with brain aneurysms    Diagnostics:  Brain MRI w/o contrast 3/10/21: "Prominent CSF within the optic nerve sheaths identified with prominent partially of the sella turcica   In the setting of headache, consideration for idiopathic intracranial hypertension (pseudotumor cerebri) should be considered   Diffusely hypointense marrow signal in the T1 sequence identified   This is nonspecific however can be seen in the setting of anemia, tobacco abuse, or other marrow infiltrating processes "        Past Medical History:   Diagnosis Date    Asthma        Past Surgical History:   Procedure Laterality Date     SECTION      FL LUMBAR PUNCTURE DIAGNOSTIC  2022       Current Outpatient Medications   Medication Sig Dispense Refill    acetaZOLAMIDE (DIAMOX) 250 mg tablet 2 tabs qam 60 tablet 5    Advair HFA 45-21 MCG/ACT inhaler INHALE 2 PUFFS 2 (TWO) TIMES A DAY RINSE MOUTH AFTER USE  24 Inhaler 3    albuterol (2 5 mg/3 mL) 0 083 % nebulizer solution TAKE 1 VIAL BY NEBULIZATION EVERY 6 HOURS AS NEEDED FOR WHEEZING OR SHORTNESS OF BREATH 75 mL 1    albuterol (PROVENTIL HFA,VENTOLIN HFA) 90 mcg/act inhaler TAKE 2 PUFFS BY MOUTH EVERY 6 HOURS AS NEEDED FOR WHEEZE 8 5 g 0    cyclobenzaprine (FLEXERIL) 5 mg tablet Take 2 tablets (10 mg total) by mouth daily at bedtime 30 tablet 0    Diclofenac Sodium (VOLTAREN) 1 % APPLY 2 G TOPICALLY 4 (FOUR) TIMES A DAY AS NEEDED (B/L LE PAIN) 100 g 1    ferrous sulfate 324 (65 Fe) mg Take 1 tablet (324 mg total) by mouth every other day 30 tablet 3    fluticasone-salmeterol (Advair) 500-50 mcg/dose inhaler Inhale 1 puff 2 (two) times a day      furosemide (LASIX) 20 mg tablet Take 1 tablet (20 mg total) by mouth daily 30 tablet 2    gabapentin (NEURONTIN) 100 mg capsule Take 1 capsule (100 mg total) by mouth 3 (three) times a day 30 capsule 2    indomethacin (INDOCIN) 25 mg capsule 1-2 tabs q8 hours prn severe headache WITH FOOD 15 capsule 0    Klor-Con M20 20 MEQ tablet TAKE 1 TABLET BY MOUTH EVERY DAY 90 tablet 3    lisinopril (ZESTRIL) 5 mg tablet TAKE 1 TABLET BY MOUTH EVERY DAY 90 tablet 3    pantoprazole (PROTONIX) 40 mg tablet Take 1 tablet (40 mg total) by mouth daily 30 tablet 0    Riboflavin 400 MG TABS Take 1 tablet (400 mg total) by mouth daily 30 tablet 0    topiramate (TOPAMAX) 50 MG tablet 75 mg qhs x 1 week, then 100 mg qhs 60 tablet 5    sucralfate (CARAFATE) 1 g/10 mL suspension Take 10 mL (1 g total) by mouth 4 (four) times a day (with meals and at bedtime) (Patient not taking: Reported on 9/29/2021) 1200 mL 0     No current facility-administered medications for this visit  No Known Allergies    Review of Systems   Constitutional: Negative  Negative for appetite change and fever  HENT: Negative  Negative for hearing loss, tinnitus, trouble swallowing and voice change  Eyes: Negative  Negative for photophobia and pain  Respiratory: Negative  Negative for shortness of breath  Cardiovascular: Negative    Negative for palpitations  Gastrointestinal: Negative  Negative for nausea and vomiting  Endocrine: Negative  Negative for cold intolerance  Genitourinary: Negative  Negative for dysuria, frequency and urgency  Musculoskeletal: Negative  Negative for myalgias and neck pain  Skin: Negative  Negative for rash  Neurological: Negative  Negative for dizziness, tremors, seizures, syncope, facial asymmetry, speech difficulty, weakness, light-headedness, numbness and headaches  Hematological: Negative  Does not bruise/bleed easily  Psychiatric/Behavioral: Negative  Negative for confusion, hallucinations and sleep disturbance  The following portions of the patient's history were reviewed and updated as appropriate: allergies, current medications/ medication history, past family history, past medical history, past social history, past surgical history and problem list     Review of systems was reviewed and otherwise unremarkable from a neurological perspective  Video Exam    There were no vitals filed for this visit  Physical Exam   Neurological exam:  On neurologic exam, the patient is alert and oriented to time and place  Speech is fluent and articulate, and the patient follows commands appropriately  Judgment and affect appear normal   Extraocular muscles are intact without nystagmus  Face is symmetric  Hearing is intact bilaterally  Motor examination reveals adequate range of motion  I spent 30 minutes directly with the patient during this visit    [address] verbally agrees to participate in [employer]  Pt is aware that [employer] could be limited without vital signs or the ability to perform a full hands-on physical Linda Gavin understands she or the provider may request at any time to terminate the video visit and request the patient to seek care or treatment in person

## 2022-12-12 ENCOUNTER — HOSPITAL ENCOUNTER (EMERGENCY)
Facility: HOSPITAL | Age: 37
Discharge: HOME | End: 2022-12-12
Attending: EMERGENCY MEDICINE

## 2022-12-12 ENCOUNTER — APPOINTMENT (EMERGENCY)
Dept: RADIOLOGY | Facility: HOSPITAL | Age: 37
End: 2022-12-12

## 2022-12-12 VITALS
DIASTOLIC BLOOD PRESSURE: 60 MMHG | OXYGEN SATURATION: 98 % | SYSTOLIC BLOOD PRESSURE: 125 MMHG | HEART RATE: 70 BPM | TEMPERATURE: 98 F | RESPIRATION RATE: 16 BRPM

## 2022-12-12 DIAGNOSIS — J45.901 ACUTE ASTHMA EXACERBATION: Primary | ICD-10-CM

## 2022-12-12 LAB
ATRIAL RATE: 65 BPM
ATRIAL RATE: 67 BPM
FLUAV RNA RESP QL NAA+PROBE: NEGATIVE
FLUBV RNA RESP QL NAA+PROBE: NEGATIVE
P AXIS: 60 DEGREES
P AXIS: 60 DEGREES
PR INTERVAL: 156 MS
PR INTERVAL: 174 MS
QRS AXIS: 61 DEGREES
QRS AXIS: 64 DEGREES
QRSD INTERVAL: 86 MS
QRSD INTERVAL: 90 MS
QT INTERVAL: 386 MS
QT INTERVAL: 394 MS
QTC INTERVAL: 407 MS
QTC INTERVAL: 409 MS
RSV RNA RESP QL NAA+PROBE: NEGATIVE
SARS-COV-2 RNA RESP QL NAA+PROBE: NEGATIVE
T WAVE AXIS: 52 DEGREES
T WAVE AXIS: 53 DEGREES
VENTRICULAR RATE: 65 BPM
VENTRICULAR RATE: 67 BPM

## 2022-12-12 RX ORDER — PREDNISONE 20 MG/1
40 TABLET ORAL DAILY
Qty: 10 TABLET | Refills: 0 | Status: SHIPPED | OUTPATIENT
Start: 2022-12-12 | End: 2022-12-17

## 2022-12-12 RX ORDER — ALBUTEROL SULFATE 2.5 MG/3ML
2.5 SOLUTION RESPIRATORY (INHALATION) EVERY 6 HOURS PRN
Qty: 84 ML | Refills: 0 | Status: SHIPPED | OUTPATIENT
Start: 2022-12-12 | End: 2022-12-19

## 2022-12-12 RX ORDER — METHYLPREDNISOLONE SODIUM SUCCINATE 125 MG/2ML
125 INJECTION, POWDER, LYOPHILIZED, FOR SOLUTION INTRAMUSCULAR; INTRAVENOUS ONCE
Status: COMPLETED | OUTPATIENT
Start: 2022-12-12 | End: 2022-12-12

## 2022-12-12 RX ORDER — ALBUTEROL SULFATE 2.5 MG/3ML
5 SOLUTION RESPIRATORY (INHALATION) ONCE
Status: COMPLETED | OUTPATIENT
Start: 2022-12-12 | End: 2022-12-12

## 2022-12-12 RX ADMIN — IPRATROPIUM BROMIDE 0.5 MG: 0.5 SOLUTION RESPIRATORY (INHALATION) at 14:50

## 2022-12-12 RX ADMIN — ALBUTEROL SULFATE 5 MG: 2.5 SOLUTION RESPIRATORY (INHALATION) at 14:50

## 2022-12-12 RX ADMIN — METHYLPREDNISOLONE SODIUM SUCCINATE 125 MG: 125 INJECTION, POWDER, FOR SOLUTION INTRAMUSCULAR; INTRAVENOUS at 14:24

## 2022-12-12 NOTE — Clinical Note
Sheree Gipson was seen and treated in our emergency department on 12/12/2022  No restrictions            Diagnosis:     Cindy  may return to work on return date  She may return on this date: 12/15/2022         If you have any questions or concerns, please don't hesitate to call        Dayan Macario PA-C    ______________________________           _______________          _______________  Hospital Representative                              Date                                Time

## 2022-12-12 NOTE — ED PROVIDER NOTES
History  Chief Complaint   Patient presents with   • Wheezing     Pt reports sob, wheezing, gen aches for the past couple of days  Pt states she has a hx of asthma and has taken prx breathing tx and steroids w/o relief     44yo female who presents to ER for evaluation of asthma exacerbation  Onset 2 days ago  States she is using her albuterol and Advair without relief  Complains of sob and chest tightness  Admits to cough  Denies sore throat or ear pain  Admits to mild headache and feeling some generalized tingling  States she has a h/o pseudotumor cerebri and is on blood pressure medicine  History provided by:  Patient      Prior to Admission Medications   Prescriptions Last Dose Informant Patient Reported? Taking? Advair HFA 45-21 MCG/ACT inhaler   No No   Sig: INHALE 2 PUFFS 2 (TWO) TIMES A DAY RINSE MOUTH AFTER USE     Diclofenac Sodium (VOLTAREN) 1 %   No No   Sig: APPLY 2 G TOPICALLY 4 (FOUR) TIMES A DAY AS NEEDED (B/L LE PAIN)   Klor-Con M20 20 MEQ tablet   No No   Sig: TAKE 1 TABLET BY MOUTH EVERY DAY   Riboflavin 400 MG TABS   No No   Sig: Take 1 tablet (400 mg total) by mouth daily   acetaZOLAMIDE (DIAMOX) 250 mg tablet   No No   Si tabs qam   albuterol (2 5 mg/3 mL) 0 083 % nebulizer solution   No No   Sig: TAKE 1 VIAL BY NEBULIZATION EVERY 6 HOURS AS NEEDED FOR WHEEZING OR SHORTNESS OF BREATH   albuterol (PROVENTIL HFA,VENTOLIN HFA) 90 mcg/act inhaler   No No   Sig: TAKE 2 PUFFS BY MOUTH EVERY 6 HOURS AS NEEDED FOR WHEEZE   cyclobenzaprine (FLEXERIL) 5 mg tablet   No No   Sig: Take 2 tablets (10 mg total) by mouth daily at bedtime   ferrous sulfate 324 (65 Fe) mg   No No   Sig: Take 1 tablet (324 mg total) by mouth every other day   fluticasone-salmeterol (Advair) 500-50 mcg/dose inhaler   Yes No   Sig: Inhale 1 puff 2 (two) times a day   furosemide (LASIX) 20 mg tablet   No No   Sig: Take 1 tablet (20 mg total) by mouth daily   gabapentin (NEURONTIN) 100 mg capsule   No No   Sig: Take 1 capsule (100 mg total) by mouth 3 (three) times a day   indomethacin (INDOCIN) 25 mg capsule   No No   Si-2 tabs q8 hours prn severe headache WITH FOOD   lisinopril (ZESTRIL) 5 mg tablet   No No   Sig: TAKE 1 TABLET BY MOUTH EVERY DAY   pantoprazole (PROTONIX) 40 mg tablet   No No   Sig: Take 1 tablet (40 mg total) by mouth daily   sucralfate (CARAFATE) 1 g/10 mL suspension   No No   Sig: Take 10 mL (1 g total) by mouth 4 (four) times a day (with meals and at bedtime)   Patient not taking: Reported on 2021   topiramate (TOPAMAX) 50 MG tablet   No No   Si mg qhs x 1 week, then 100 mg qhs      Facility-Administered Medications: None       Past Medical History:   Diagnosis Date   • Asthma        Past Surgical History:   Procedure Laterality Date   •  SECTION     • FL LUMBAR PUNCTURE DIAGNOSTIC  2022       Family History   Problem Relation Age of Onset   • No Known Problems Other    • Arthritis Mother    • Fibromyalgia Mother    • Diabetes Family    • Cancer Family    • Heart disease Family      I have reviewed and agree with the history as documented  E-Cigarette/Vaping   • E-Cigarette Use Never User      E-Cigarette/Vaping Substances   • Nicotine No    • THC No    • CBD No    • Flavoring No    • Other No    • Unknown No      Social History     Tobacco Use   • Smoking status: Never   • Smokeless tobacco: Never   Vaping Use   • Vaping Use: Never used   Substance Use Topics   • Alcohol use: No   • Drug use: No       Review of Systems   Constitutional: Positive for fatigue  HENT: Positive for congestion  Negative for sore throat  Eyes: Negative for pain  Respiratory: Positive for cough and chest tightness  Cardiovascular: Positive for chest pain  Gastrointestinal: Negative for abdominal pain, diarrhea and vomiting  Musculoskeletal: Negative for neck stiffness  Skin: Negative for rash  Neurological: Positive for headaches  Negative for dizziness         Physical Exam  Physical Exam  Vitals and nursing note reviewed  Constitutional:       Appearance: Normal appearance  She is well-developed  HENT:      Head: Atraumatic  Right Ear: Tympanic membrane and external ear normal       Left Ear: Tympanic membrane and external ear normal       Nose: Nose normal  No rhinorrhea  Mouth/Throat:      Mouth: Mucous membranes are moist       Pharynx: Uvula midline  No oropharyngeal exudate or posterior oropharyngeal erythema  Tonsils: No tonsillar exudate  Eyes:      General: No scleral icterus  Conjunctiva/sclera: Conjunctivae normal    Cardiovascular:      Rate and Rhythm: Normal rate and regular rhythm  Heart sounds: Normal heart sounds  Pulmonary:      Effort: Pulmonary effort is normal       Breath sounds: Wheezing present  Musculoskeletal:         General: Normal range of motion  Cervical back: Neck supple  Lymphadenopathy:      Cervical: No cervical adenopathy  Skin:     General: Skin is warm and dry  Findings: No rash  Neurological:      Mental Status: She is alert and oriented to person, place, and time     Psychiatric:         Mood and Affect: Mood normal          Vital Signs  ED Triage Vitals   Temperature Pulse Respirations Blood Pressure SpO2   12/12/22 1107 12/12/22 1105 12/12/22 1105 12/12/22 1105 12/12/22 1105   98 °F (36 7 °C) 71 20 (!) 156/107 100 %      Temp Source Heart Rate Source Patient Position - Orthostatic VS BP Location FiO2 (%)   12/12/22 1105 12/12/22 1105 12/12/22 1105 12/12/22 1105 --   Oral Monitor Sitting Right arm       Pain Score       12/12/22 1105       5           Vitals:    12/12/22 1105 12/12/22 1405   BP: (!) 156/107 125/60   Pulse: 71 70   Patient Position - Orthostatic VS: Sitting Lying         Visual Acuity      ED Medications  Medications   albuterol inhalation solution 5 mg (5 mg Nebulization Given 12/12/22 1450)   ipratropium (ATROVENT) 0 02 % inhalation solution 0 5 mg (0 5 mg Nebulization Given 12/12/22 1450) methylPREDNISolone sodium succinate (Solu-MEDROL) injection 125 mg (125 mg Intravenous Given 12/12/22 1424)       Diagnostic Studies  Results Reviewed     Procedure Component Value Units Date/Time    FLU/RSV/COVID - if FLU/RSV clinically relevant [[id number]] Collected: 12/12/22 1425    Lab Status: In process Specimen: Nares from Nose Updated: 12/12/22 1524                 XR chest 2 views   ED Interpretation by Nina Turcios PA-C (12/12 1451)   NAD      Final Result by Josh Landin MD (12/12 0600)      No acute cardiopulmonary disease  Workstation performed: [id number]                    Procedures  Procedures         ED Course  ED Course as of 12/12/22 1557   Mon Dec 12, 2022   1459 Patient starting to feel better, lung sounds increasing, still on neb                               SBIRT 20yo+    Flowsheet Row Most Recent Value   SBIRT (23 yo +)    In order to provide better care to our patients, we are screening all of our patients for alcohol and drug use  Would it be okay to ask you these screening questions? Unable to answer at this time Filed at: 12/12/2022 1353                    MDM  Number of Diagnoses or Management Options     Amount and/or Complexity of Data Reviewed  Clinical lab tests: ordered  Tests in the radiology section of CPT®: ordered and reviewed    Risk of Complications, Morbidity, and/or Mortality  General comments: Differential diagnosis includes but is not limited to: flu/covid, pna, pnx, asthma exacerbation, bronchitits    Discussed s/s if worse to return to ER      Patient Progress  Patient progress: improved (Peak flow post treatment 275)      Disposition  Final diagnoses:   Acute asthma exacerbation     Time reflects when diagnosis was documented in both MDM as applicable and the Disposition within this note     Time User Action Codes Description Comment    12/12/2022  3:47 PM Zac Howe Add [C28 127] Acute asthma exacerbation       ED Disposition     ED Disposition   Discharge    Condition   Stable    Date/Time   Mon Dec 12, 2022  3:47 PM    Comment   Jewel Goode discharge to home/self care  Follow-up Information     Follow up With Specialties Details Why Contact Info Additional Anette In 3 days  [address] [phone]  [facility] [address] [place], [place], [address] Emergency Department Emergency Medicine  If symptoms worsen [address] [id number]  [address] Emergency Department, [address] [place] [address], [id number] [phone]          Patient's Medications   Discharge Prescriptions    ALBUTEROL (2 5 MG/3 ML) 0 083 % NEBULIZER SOLUTION    Take 3 mL (2 5 mg total) by nebulization every 6 (six) hours as needed for wheezing or shortness of breath for up to 7 days       Start Date: 12/12/2022End Date: 12/19/2022       Order Dose: 2 5 mg       Quantity: 84 mL    Refills: 0    PREDNISONE 20 MG TABLET    Take 2 tablets (40 mg total) by mouth daily for 5 days       Start Date: 12/12/2022End Date: 12/17/2022       Order Dose: 40 mg       Quantity: 10 tablet    Refills: 0       No discharge procedures on file      PDMP Review     None          ED Provider  Electronically Signed by           Agustín Boston PA-C  12/12/22 9847

## 2023-01-09 ENCOUNTER — OFFICE VISIT (OUTPATIENT)
Dept: INTERNAL MEDICINE CLINIC | Facility: CLINIC | Age: 38
End: 2023-01-09

## 2023-01-09 VITALS
WEIGHT: 293 LBS | SYSTOLIC BLOOD PRESSURE: 130 MMHG | DIASTOLIC BLOOD PRESSURE: 88 MMHG | TEMPERATURE: 98.6 F | HEART RATE: 82 BPM | OXYGEN SATURATION: 98 % | HEIGHT: 63 IN | BODY MASS INDEX: 51.91 KG/M2

## 2023-01-09 DIAGNOSIS — D50.8 OTHER IRON DEFICIENCY ANEMIA: ICD-10-CM

## 2023-01-09 DIAGNOSIS — G93.2 IDIOPATHIC INTRACRANIAL HYPERTENSION: ICD-10-CM

## 2023-01-09 DIAGNOSIS — K21.9 GASTROESOPHAGEAL REFLUX DISEASE, UNSPECIFIED WHETHER ESOPHAGITIS PRESENT: ICD-10-CM

## 2023-01-09 DIAGNOSIS — J45.40 MODERATE PERSISTENT ASTHMA WITHOUT COMPLICATION: Primary | ICD-10-CM

## 2023-01-09 DIAGNOSIS — I10 ESSENTIAL HYPERTENSION: ICD-10-CM

## 2023-01-09 DIAGNOSIS — E66.01 MORBID OBESITY DUE TO EXCESS CALORIES (HCC): ICD-10-CM

## 2023-01-09 DIAGNOSIS — Z59.41 FOOD INSECURITY: ICD-10-CM

## 2023-01-09 DIAGNOSIS — R20.0 LEFT ARM NUMBNESS: ICD-10-CM

## 2023-01-09 RX ORDER — PANTOPRAZOLE SODIUM 40 MG/1
40 TABLET, DELAYED RELEASE ORAL DAILY
Qty: 30 TABLET | Refills: 0 | Status: SHIPPED | OUTPATIENT
Start: 2023-01-09 | End: 2023-02-08

## 2023-01-09 RX ORDER — ACETAZOLAMIDE 250 MG/1
TABLET ORAL
Qty: 60 TABLET | Refills: 5 | Status: SHIPPED | OUTPATIENT
Start: 2023-01-09

## 2023-01-09 RX ORDER — ALBUTEROL SULFATE 90 UG/1
1 AEROSOL, METERED RESPIRATORY (INHALATION) EVERY 4 HOURS PRN
Qty: 8.5 G | Refills: 0 | Status: SHIPPED | OUTPATIENT
Start: 2023-01-09

## 2023-01-09 RX ORDER — GABAPENTIN 100 MG/1
100 CAPSULE ORAL 3 TIMES DAILY
Qty: 30 CAPSULE | Refills: 2 | Status: SHIPPED | OUTPATIENT
Start: 2023-01-09

## 2023-01-09 RX ORDER — INDOMETHACIN 25 MG/1
CAPSULE ORAL
Qty: 15 CAPSULE | Refills: 0 | Status: SHIPPED | OUTPATIENT
Start: 2023-01-09

## 2023-01-09 RX ORDER — TOPIRAMATE 50 MG/1
TABLET, FILM COATED ORAL
Qty: 60 TABLET | Refills: 5 | Status: SHIPPED | OUTPATIENT
Start: 2023-01-09

## 2023-01-09 RX ORDER — ALBUTEROL SULFATE 2.5 MG/3ML
2.5 SOLUTION RESPIRATORY (INHALATION) EVERY 6 HOURS PRN
Qty: 75 ML | Refills: 1 | Status: SHIPPED | OUTPATIENT
Start: 2023-01-09

## 2023-01-09 RX ORDER — FLUTICASONE PROPIONATE AND SALMETEROL XINAFOATE 45; 21 UG/1; UG/1
2 AEROSOL, METERED RESPIRATORY (INHALATION) 2 TIMES DAILY
Qty: 12 G | Refills: 3 | Status: SHIPPED | OUTPATIENT
Start: 2023-01-09

## 2023-01-09 SDOH — ECONOMIC STABILITY - FOOD INSECURITY: FOOD INSECURITY: Z59.41

## 2023-01-09 NOTE — PROGRESS NOTES
ASSESSMENT/PLAN:  Ms Madiha Rosales is a 40year old female with a PMHx of idiopathic intracranial hypertension, asthma, MARSHA, prediabetes (A1c 5 9), and morbid obesity:    Moderate persistent asthma without complication:  Patient with a history of persistent asthma, previously well controlled on Advair HFR inhaler and albuterol PRN  Patient admits to not having/utilizing inhalers for the last 6 months and now with the complaint of wheezing and LAFLEUR  Patient recently seen and evaluated in the ED on 12/12/22 for an asthma exacerbation  Patient administered nebs and solumedrol 125 mg x1 and was then discharged home on prednisone 40 mg x5 days  Patient with improvement in symptoms following prednisone course but admits to utilizing albuterol inhaler multiple times throughout the day and has been experiencing nighttime symptoms  On examination today, patient with diffuse inspiratory and expiratory wheezing  At this time, will restart patient on previous medications of which her asthma was very well controlled  · Restart Advair HFA 45-21 mcg/act inhaler; inhale 2 puffs BID; script sent to pharmacy  · Continue on Proventil HFA 90 mcg/act inhaler PRN for wheezing; script sent to pharmacy   · Continue on albuterol 0 083% nebulizer solution; scripts sent to pharmacy   · Advised to call office if Advair not covered and/or is too expensive   · Return to clinic in 3 months for continued follow up    Idiopathic intracranial hypertension:   Patient weight right sided occipital headaches 2/2 IIH  Patient had MRI Brain 3/2021 which showed prominent CSF within the optic nerve sheaths identified with the prominent partially of the sell turcica - findings concerning for IIH  LP later performed on 4/19/2022 which confirmed the diagnosis given an opening pressure of 26 cm and closing pressure of 12 cm   Patient had been following with neurology (last seen on 7/8/2022) and was on Topamax and Diamox with resulting improvements in headaches  Patient unfortunately has stopped these medications 2/2 insurance issues and thus has began to experience recurrent headaches  Neuro exam intact with no evidence of papilledema on exam today  Patient agreeable to restart previous medication regimen at this time and follow up with neurology closely  · Restart Topiramate 50 mg with plan to increase up to 100 mg qHS; script sent to pharmacy  · Restart Diamox 500 mg daily; script sent to pharmacy  · Restart Indomethacin 25 mg q8 hours PRN for severe headaches; script sent to pharmacy  · Repeat MRI brain previously ordered by neuro and pending completion   · Educated on the importance of being seen by Ophthalmology; Ambulatory referral placed  · Continue routine follow up with neurology; patient to schedule next appointment     Essential hypertension:   History of HTN and previously on Lisinopril 5 mg daily  Not currently taking Lisinopril or any additional anti-hypertensive medications  Blood pressure in office today: 130/88 mmHg  Well controlled off current regimen  · Monitor off antihypertensive medications  · Advised lifestyle modifications including diet and exercise     Left arm numbness:   Patient complaining of left arm numbness/tingling - involving entirety of the her left arm/hand  Symptoms ongoing x1 month  Denies significant pain but complains of worsening symptoms at night  Patient is right handed and spends all day on a computer at work, denies any other repetitive activity or arm/shoulder trauma  On exam, no visible deformity and/or atrophy and motor and sensory function are both intact  At this time, suspect neuropathic etiology to pain and will restart gabapentin  Symptoms appears to be most consistent with Thoracic Outlet Syndrome  · Restart Gabapentin 100 mg TID; script sent to pharmacy     Morbid obesity due to excess calories [facility]):   Weight 313 lbs and BMI 55 52 during today's visit   Patient with significant weight gain over the last 2-3 years with difficulty with weight loss  Patient currently working to make lifestyle modifications  Previously on Topamax for IIH along with weight management  Patient admits to losing significant amount of weight on this medication but regained lost weight  Will restart medication at this time  · Restart Topamax as above    · Advised lifestyle modifications including diet and exercise  · Consider bariatrics and/or nutrition referral in future when patient is agreeable  · Screening blood work ordered including: CMP, Hemoglobin A1c, TSH, and Lipid Panel  · BMI Counseling: Body mass index is 55 52 kg/m²  The BMI is above normal  Nutrition recommendations include reducing portion sizes, decreasing overall calorie intake and 3-5 servings of fruits/vegetables daily  Exercise recommendations include moderate aerobic physical activity for 150 minutes/week, exercising 3-5 times per week and joining a gym  Other iron deficiency anemia:  History of MARSHA previously on Ferrous Sulfate 324 mg every other day prior to patient discontinuing  Most recent Iron Panel completed 3/25/2021: Iron 26, Ferritin 24, Iron Sat 7, and TIBC 379  Asymptomatic at this time and will send for repeat blood work to further access  · Repeat CBC and Iron Panel ordered  · Pending results of blood work; may require iron supplementation     Gastroesophageal reflux disease:  History of GERD previously well controlled on Protonix 40 mg daily prior to patient discontining  EGD performed 7/2021 with normal appearing esophagus and mild, localized erythematous mucosa in the GE junction  Patient with recurrence of reflux symptoms at this time  Will restart on prior regimen  · Restart on Protonix 40 mg daily; script sent to pharmacy     Food insecurity  · Ambulatory referral to social work care management program    Schedule a follow-up appointment in 3 months for follow up of chronic medical conditions      CHIEF COMPLAINT: Follow up on medications    HISTORY OF PRESENT ILLNESS:  Ms Tiffany Ayala is a 40year old female with a PMHx of idiopathic intracranial hypertension, asthma, prediabetes (A1c 5 9), and morbid obesity who presents to the office today, 1/9/2023, for follow up of her chronic medical conditions and for medication review  During today's visit, patient explains that she had been experiencing issues with her insurance  Therefore, patient had been out and not taking any of her previously prescribed medications including her inhalers and medications for IIH  Since discontinuing these medications, patient states that she has been experiencing increasing SOB and wheezing  She was previously seen at [facility] ED on 12/12/2022 and was treated for an asthma exacerbation  Patient was prescribed prednisone and albuterol which she has continued to utilize multiple times a day  Patient also admits to increased difficulty sleeping at night 2/2 coughing  She also explains that she has began to experience headaches again approx 2-3x a week  Headaches in her right occiput and occasionally associated with blurred regime - headaches consistent with patient's prior headaches  In addition, the patient also complains of numbness/tingling in her left arm  Patient denies any significant pain and also explains that she never had any trauma to the arm/shoulder  She explains that her left arm is heavy and feels weak in comparison to the right  Denies difficulty with  or decreased ROM  Symptoms more bothersome at night  She is right handed and her job entails being on the job[employer]tal[employer]    The following portions of the patient's history were reviewed and updated as appropriate: allergies, current medications, past family history, past medical history, past social history, past surgical history and problem list     Review of Systems   Constitutional: Negative for activity change, appetite change, chills, fatigue and unexpected weight change     Eyes: Positive for visual disturbance  Negative for photophobia, pain and redness  Respiratory: Positive for shortness of breath and wheezing  Negative for apnea, cough and chest tightness  Cardiovascular: Negative for chest pain, palpitations and leg swelling  Gastrointestinal: Negative for abdominal distention, abdominal pain, constipation, diarrhea, nausea and vomiting  Endocrine: Negative for polydipsia, polyphagia and polyuria  Genitourinary: Negative for difficulty urinating  Musculoskeletal: Negative for back pain, gait problem, neck pain and neck stiffness  Skin: Negative for color change and pallor  Neurological: Positive for numbness and headaches  Negative for dizziness, weakness and light-headedness  Psychiatric/Behavioral: Negative for agitation and confusion  OBJECTIVE:  Vitals:    01/09/23 1540   BP: 130/88   BP Location: Right arm   Patient Position: Sitting   Cuff Size: Large   Pulse: 82   Temp: 98 6 °F (37 °C)   TempSrc: Temporal   SpO2: 98%   Weight: (!) 142 kg (313 lb 6 4 oz)   Height: 5' 3" (1 6 m)     Physical Exam  Constitutional:       General: She is not in acute distress  Appearance: She is obese  She is not ill-appearing or toxic-appearing  HENT:      Head: Normocephalic and atraumatic  Nose: Nose normal  No congestion  Mouth/Throat:      Mouth: Mucous membranes are moist       Pharynx: Oropharynx is clear  No oropharyngeal exudate  Eyes:      General: No scleral icterus  Extraocular Movements: Extraocular movements intact  Conjunctiva/sclera: Conjunctivae normal       Pupils: Pupils are equal, round, and reactive to light  Cardiovascular:      Rate and Rhythm: Normal rate and regular rhythm  Pulses: Normal pulses  Heart sounds: Normal heart sounds  No murmur heard  No gallop  Pulmonary:      Effort: Pulmonary effort is normal  No respiratory distress  Breath sounds: Wheezing present  No rhonchi or rales     Abdominal: General: Abdomen is flat  Bowel sounds are normal  There is no distension  Palpations: Abdomen is soft  Tenderness: There is no abdominal tenderness  There is no guarding or rebound  Musculoskeletal:      Cervical back: Normal range of motion  No rigidity or tenderness  Right lower leg: No edema  Left lower leg: No edema  Lymphadenopathy:      Cervical: No cervical adenopathy  Skin:     General: Skin is warm  Capillary Refill: Capillary refill takes less than 2 seconds  Coloration: Skin is not jaundiced or pale  Neurological:      Mental Status: She is alert and oriented to person, place, and time  Mental status is at baseline  Cranial Nerves: No cranial nerve deficit  Sensory: No sensory deficit  Motor: No weakness     Psychiatric:         Mood and Affect: Mood normal          Behavior: Behavior normal            Current Outpatient Medications:   •  acetaZOLAMIDE (DIAMOX) 250 mg tablet, 2 tabs qam (Patient not taking: Reported on 1/9/2023), Disp: 60 tablet, Rfl: 5  •  Advair HFA 45-21 MCG/ACT inhaler, INHALE 2 PUFFS 2 (TWO) TIMES A DAY RINSE MOUTH AFTER USE  (Patient not taking: Reported on 1/9/2023), Disp: 24 Inhaler, Rfl: 3  •  albuterol (2 5 mg/3 mL) 0 083 % nebulizer solution, TAKE 1 VIAL BY NEBULIZATION EVERY 6 HOURS AS NEEDED FOR WHEEZING OR SHORTNESS OF BREATH (Patient not taking: Reported on 1/9/2023), Disp: 75 mL, Rfl: 1  •  albuterol (PROVENTIL HFA,VENTOLIN HFA) 90 mcg/act inhaler, TAKE 2 PUFFS BY MOUTH EVERY 6 HOURS AS NEEDED FOR WHEEZE (Patient not taking: Reported on 1/9/2023), Disp: 8 5 g, Rfl: 0  •  cyclobenzaprine (FLEXERIL) 5 mg tablet, Take 2 tablets (10 mg total) by mouth daily at bedtime (Patient not taking: Reported on 1/9/2023), Disp: 30 tablet, Rfl: 0  •  Diclofenac Sodium (VOLTAREN) 1 %, APPLY 2 G TOPICALLY 4 (FOUR) TIMES A DAY AS NEEDED (B/L LE PAIN) (Patient not taking: Reported on 1/9/2023), Disp: 100 g, Rfl: 1  •  ferrous sulfate 324 (65 Fe) mg, Take 1 tablet (324 mg total) by mouth every other day (Patient not taking: Reported on 2023), Disp: 30 tablet, Rfl: 3  •  fluticasone-salmeterol (Advair) 500-50 mcg/dose inhaler, Inhale 1 puff 2 (two) times a day (Patient not taking: Reported on 2023), Disp: , Rfl:   •  furosemide (LASIX) 20 mg tablet, Take 1 tablet (20 mg total) by mouth daily (Patient not taking: Reported on 2023), Disp: 30 tablet, Rfl: 2  •  gabapentin (NEURONTIN) 100 mg capsule, Take 1 capsule (100 mg total) by mouth 3 (three) times a day (Patient not taking: Reported on 2023), Disp: 30 capsule, Rfl: 2  •  indomethacin (INDOCIN) 25 mg capsule, 1-2 tabs q8 hours prn severe headache WITH FOOD (Patient not taking: Reported on 2023), Disp: 15 capsule, Rfl: 0  •  Klor-Con M20 20 MEQ tablet, TAKE 1 TABLET BY MOUTH EVERY DAY (Patient not taking: Reported on 2023), Disp: 90 tablet, Rfl: 3  •  lisinopril (ZESTRIL) 5 mg tablet, TAKE 1 TABLET BY MOUTH EVERY DAY (Patient not taking: Reported on 2023), Disp: 90 tablet, Rfl: 3  •  pantoprazole (PROTONIX) 40 mg tablet, Take 1 tablet (40 mg total) by mouth daily (Patient not taking: Reported on 2023), Disp: 30 tablet, Rfl: 0  •  Riboflavin 400 MG TABS, Take 1 tablet (400 mg total) by mouth daily (Patient not taking: Reported on 2023), Disp: 30 tablet, Rfl: 0  •  sucralfate (CARAFATE) 1 g/10 mL suspension, Take 10 mL (1 g total) by mouth 4 (four) times a day (with meals and at bedtime) (Patient not taking: Reported on 2021), Disp: 1200 mL, Rfl: 0  •  topiramate (TOPAMAX) 50 MG tablet, 75 mg qhs x 1 week, then 100 mg qhs (Patient not taking: Reported on 2023), Disp: 60 tablet, Rfl: 5    Past Medical History:   Diagnosis Date   • Asthma      Past Surgical History:   Procedure Laterality Date   •  SECTION     • FL LUMBAR PUNCTURE DIAGNOSTIC  2022     Social History     Socioeconomic History   • Marital status: /Civil Union     Spouse name: Not on file   • Number of children: Not on file   • Years of education: Not on file   • Highest education level: Not on file   Occupational History   • Not on file   Tobacco Use   • Smoking status: Never   • Smokeless tobacco: Never   Vaping Use   • Vaping Use: Never used   Substance and Sexual Activity   • Alcohol use: No   • Drug use: No   • Sexual activity: Yes   Other Topics Concern   • Not on file   Social History Narrative    Social problem      Social Determinants of Health     Financial Resource Strain: Low Risk    • Difficulty of Paying Living Expenses: Not hard at all   Food Insecurity: Food Insecurity Present   • Worried About [address] in the Last Year: Sometimes true   • Ran Out of Food in the Last Year: Sometimes true   Transportation Needs: No Transportation Needs   • Lack of Transportation (Medical): No   • Lack of Transportation (Non-Medical): No   Physical Activity: Not on file   Stress: Not on file   Social Connections: Not on file   Intimate Partner Violence: Not on file   Housing Stability: Low Risk    • Unable to Pay for Housing in the Last Year: No   • Number of Places Lived in the Last Year: 1   • Unstable Housing in the Last Year: No     Family History   Problem Relation Age of Onset   • No Known Problems Other    • Arthritis Mother    • Fibromyalgia Mother    • Diabetes Family    • Cancer Family    • Heart disease Family        ==  DO Taryn Izquierdo 73 Internal Medicine PGY-3    [place] [id number]  [address]   [facility] , [facility] [id number] [place] [address] [address], [address] [facility]  Office: [phone]  Fax: [phone]

## 2023-01-10 ENCOUNTER — TELEPHONE (OUTPATIENT)
Dept: INTERNAL MEDICINE CLINIC | Facility: CLINIC | Age: 38
End: 2023-01-10

## 2023-01-10 NOTE — TELEPHONE ENCOUNTER
Patient called requesting that the ambulatory referral be faxed to the ophthalmologist office located on [address] location  Printed ambulatory referral, faxed to Dr Melissa Gillette office Fax #[phone], received transaction report

## 2023-01-12 ENCOUNTER — TELEPHONE (OUTPATIENT)
Dept: INTERNAL MEDICINE CLINIC | Facility: CLINIC | Age: 38
End: 2023-01-12

## 2023-01-12 NOTE — TELEPHONE ENCOUNTER
Pharmacist Marilynn from [facility] pharmacy called stating in their computer system the medications Topiramate 50 mg tablets (sent on 01/09/23) and Acetazolamide 250mg tablets is creating a warning for drug interaction  Per Marilynn the warning states increase form of kidney stone formation       Please review and advise

## 2023-01-13 ENCOUNTER — PATIENT OUTREACH (OUTPATIENT)
Dept: INTERNAL MEDICINE CLINIC | Facility: CLINIC | Age: 38
End: 2023-01-13

## 2023-01-13 DIAGNOSIS — Z59.9 FINANCIAL DIFFICULTY: Primary | ICD-10-CM

## 2023-01-13 SDOH — ECONOMIC STABILITY - INCOME SECURITY: PROBLEM RELATED TO HOUSING AND ECONOMIC CIRCUMSTANCES, UNSPECIFIED: Z59.9

## 2023-01-13 NOTE — TELEPHONE ENCOUNTER
Patient previously on this medication combination due to intractable headaches  Patient understanding and accepting of risks

## 2023-01-13 NOTE — PROGRESS NOTES
JASON received a new referral in regard to food insecurity  SWCM contacted pt and introduced self and role  Pt shared that she is going through a separation form her spouse and that is why she is having financial difficulties  Pt has applied for SNAP benefits and was denied  Pt is currently appealing the decision  Pt also shares she receives food from her child's school [employer] and from a [employer] in [place]  Pt reports not needing any additional resources in regard to food  Pt shares that the main financial issue she is having is that the waterbill where she lives is in her landlords name and she pays the bill  Pt reports the waterbill is $900 00 and she is unable to pay it  JASON did advise pt it may be a difficult process because if pt applies for any assistance programs, the bill is not in her name  For example, we cannot call [facility] together to inquire about assistance programs because the account is not in her name  JASON did research programs for waterbill assistance and DHS has a program called [facility], but it appears that the last application closed on 04/56/8037  JASON attempted to reach [facility] assistance office @ [phone] to inquire when the program will be available again and I was unable to reach anyone  I also encouraged pt to reach out to [address] to inquire if they will assist with a water/ bill  @ [phone]  [facility] will continue to support pt and help with resources for the water/swere bill/ However, pt was made aware that programs may not be able to assist because the bill is not in her name   JASON will also refer pt to [facility] to assist

## 2023-01-13 NOTE — TELEPHONE ENCOUNTER
Spoke to pharmacist she stated the Diomax and the topirimate came back with an interaction for increased risk of metabolic acidosis and kidney formation

## 2023-01-13 NOTE — TELEPHONE ENCOUNTER
Called patient to discuss the increased risk of kidney stones while on this medication  Patient understanding and aware of this risk  Patient encouraged to drink 6-8 glasses of water daily while on these medications  Educated on the signs and symptoms of kidney stones  Patient appreciative of the call  Please have pharmacy fill these medications - Patient has received the Diamox but has not received the Topiramate  Thank you

## 2023-01-17 ENCOUNTER — PATIENT OUTREACH (OUTPATIENT)
Dept: PEDIATRICS CLINIC | Facility: CLINIC | Age: 38
End: 2023-01-17

## 2023-01-17 NOTE — PROGRESS NOTES
[facility] LM requesting a call back to assist with any food needs and to set up an appt with [facility] who is out this week and will be assisting her with applications  [facility] wanting to let her know what documents to gather for appt with KINGSTON ENRIQUEZ [facility] left my number for a call back

## 2023-01-18 NOTE — TELEPHONE ENCOUNTER
Called and spoke to [facility] pharmacist, made her aware as per note   Marilynn understood and made a note of it in patient's chart

## 2023-01-23 ENCOUNTER — PATIENT OUTREACH (OUTPATIENT)
Dept: INTERNAL MEDICINE CLINIC | Facility: CLINIC | Age: 38
End: 2023-01-23

## 2023-01-23 NOTE — PROGRESS NOTES
CMOC called the patient to introduced herself and her role  The patient agreed to services  [facility] asked the patient which financial difficulty she has  Patient stated that she the must important at this moment its her Water bill with a balances of $900  Patient informed that the bill it is in the landlord name  [facility] explained to the patient that the bill must be on her name to apply for any fund  [facility] will communicated with her team to see what is the best way to help the patient  Patient informed [facility] that she apply for [facility] on November but she never sent any document to complete the application  OC along with the patient called to [facility] to get more information but it was a long wait and patient was at her job   [facility] proposed patient to call tomorrow in the morning, patient agreed to call on January 24, 2023 after 9:00 am

## 2023-01-24 ENCOUNTER — TELEPHONE (OUTPATIENT)
Dept: INTERNAL MEDICINE CLINIC | Facility: CLINIC | Age: 38
End: 2023-01-24

## 2023-01-24 NOTE — TELEPHONE ENCOUNTER
Received call from patient's pharmacy stating there is a drug  interaction with the topiramate and acetazolamide  Please review and advise if any changes need to be made as the covering Doctor

## 2023-01-25 DIAGNOSIS — Z78.9: Primary | ICD-10-CM

## 2023-02-13 ENCOUNTER — TELEPHONE (OUTPATIENT)
Dept: NEUROLOGY | Facility: CLINIC | Age: 38
End: 2023-02-13

## 2023-02-13 NOTE — TELEPHONE ENCOUNTER
----- Message from Crystal Morse sent at 2/13/2023 11:57 AM EST -----  Regarding: Transportation for 2/15/23 Appointment  Good morning ladies,    I contacted patient this morning to confirm her appointment with Dr Fadi Altamirano on 2/15/23 at 6 am in [address] [place] [address]  Patient advised she does not have transportation and this is a very important appointment  Can you review and contact patient?     Thanks   Bard Mckenzie

## 2023-02-14 NOTE — TELEPHONE ENCOUNTER
MSW phoned patient at [phone] regarding transportation this date  Patient stated that she has been relying on Lyfts provided by [facility] to get to/from her appts  MSW advised that due to the short notice, our office can offer a round trip Lyft as a one time courtesy, but that we will need her to consider alternative transportation going forward  MSW will set up a Lyft ride with [employer] for tomorrow's appt  Patient is aware to be ready one hour before her appt and is aware that she will receive a call/text with the /car info and ETA  Patient is aware that Lyft rides are subject to  availability in her area at the time of the appt  Lyft Qualifier Tool completed  Patient will not be traveling with any assistive device//oxygen  A Transportation Waiver is on file from patient's PCP office, but this writer is not familiar with the version of same  Patient agreeable to sign new copy  Waiver was emailed to [employer] who will have patient sign at the time of her arrival on 2/15  Gale Guzman will then scan the Waiver into the chart  MSW educated patient about [employer]  She is interested in applying  MSW will sent the Nolio Sas application as well as a copy of her proof of age document (same needs to accompany the application) via [employer]  Patient will complete return this application as soon as possible  Patient is interested in seeing if she will qualify for the 60 day courtesy pass  MSW will contact [employer] to request same  MSW phoned [employer] at [phone] and spoke with Angelica Yañez  He scheduled a Lyft for patient to pick her up at [address], [address] to transport her to [address] [facility], [address] [facility], [address] for a [id number]   Angelica Yañez stated that patient will get a call/text with the /car info and ETA  MSW placed the phone number for [employer] in the Appt Desk so that the [employer] staff can call for return Lyft ride home      MSVALERY phoned [employer] at [phone], ext [phone], and spoke with [facility] ran patient's eligibility - patient approved for 60 day courtesy pass which will  on 23  [facility] stated that it will take a day to register her so patient can begin to call after 2/15 at 130PM for any future transportation requests  MSW sent [employer] message with [employer] application, proof of age document, and notice of approval for the 60 day courtesy pass  MSW will follow-up with patient next week regarding the [employer] application

## 2023-02-15 ENCOUNTER — TELEPHONE (OUTPATIENT)
Dept: NEUROLOGY | Facility: CLINIC | Age: 38
End: 2023-02-15

## 2023-02-15 NOTE — TELEPHONE ENCOUNTER
JASON spoke with staff regarding patient missed appointment  Virtual was offered by the medical assistant, however patient did not answer call and message had to be left  Reportedly, call center was not able to offer virtual at the time [employer] canceled patient's ride  Patient was placed on a cancellation list but does not have an appointment scheduled until May  JASON called patient at [phone]  Requested call back  Called to obtain further information, verify address  Please attempt virtual appointment when possible  JASON remains available

## 2023-02-15 NOTE — TELEPHONE ENCOUNTER
Patient called and stated that she did not receive a text message from [employer] regarding her  time or any information  I called [employer] and let them know of this and [employer] had made a new  for the patient  The second  went to the wrong house and cancelled the patients       I greatly apologized to the patient and rescheduled her in May and also placed patient on the wait list

## 2023-02-16 NOTE — TELEPHONE ENCOUNTER
SW called patient at [phone]  Requested call back  Left JASON contact information and contact for the office directly in message  [employer] message sent by coworker on 2/14 was not read by patient  JASON remains available

## 2023-02-17 NOTE — TELEPHONE ENCOUNTER
This writer attempted to call pt after4:30pm 2x to learn if she would be available for a sooner appt if so to provide days/times available in order for MA to evaluate if she can make the accomodation  SW team is awaiting on a call back

## 2023-02-21 NOTE — TELEPHONE ENCOUNTER
JASON called patient at [phone]  Left message with contact information if patient chooses to call back to see if earlier apt or virtual apt can be made  SW team remains available

## 2023-02-23 NOTE — TELEPHONE ENCOUNTER
MSW reviewed chart  Patient did not make it to 2/15 appt despite this writer having arranged a [employer] ride  MSW spoke with William Ziegler at [facility] - he reported 2 [employer] were called for patient but patient never got into the vehicle and then 1 hour later the office called requesting another [employer]ft, but patient did not get in that vehicle either  Patient told our office staff that she never received a call/text with the  info  Patient has been rescheduled for 5/31/23 at 12PM , but she is on a cancellation list in case anything becomes available sooner  MSW attempted to reach patient regarding transportation - she is registered for a 60 day courtesy pass with [employer] until 4/17/23 and needs to complete application for ongoing transportation  [employer] can only be offered as a service of last resort  No answer at [phone]  MSW left a message requesting callback  Awaiting same

## 2023-02-24 NOTE — TELEPHONE ENCOUNTER
MSW attempted to reach patient regarding transportation - she is registered for a 60 day courtesy pass with [employer] until 4/17/23 and needs to complete application for ongoing transportation  Lyft can only be offered as a service of last resort  No answer at [phone]  MSW left a message requesting callback  Awaiting same

## 2023-03-17 ENCOUNTER — PATIENT OUTREACH (OUTPATIENT)
Dept: INTERNAL MEDICINE CLINIC | Facility: CLINIC | Age: 38
End: 2023-03-17

## 2023-03-17 NOTE — PROGRESS NOTES
JASON has reviewed chart and notes pt's Elta Crews temporrty registation expires 4/17/23  [facility] is attempting to assist with SNAP and was going to attempt to assisit with over due Water bill  JASON/ [facility] to f/u with pt to assist as indicated

## 2023-03-20 ENCOUNTER — VBI (OUTPATIENT)
Dept: ADMINISTRATIVE | Facility: OTHER | Age: 38
End: 2023-03-20

## 2023-05-03 ENCOUNTER — PATIENT OUTREACH (OUTPATIENT)
Dept: MULTI SPECIALTY CLINIC | Facility: CLINIC | Age: 38
End: 2023-05-03

## 2023-05-04 ENCOUNTER — PATIENT OUTREACH (OUTPATIENT)
Dept: MULTI SPECIALTY CLINIC | Facility: CLINIC | Age: 38
End: 2023-05-04

## 2023-05-04 NOTE — PROGRESS NOTES
Chart review    [facility] called the patient to verify what is the reason that  the patient did not pay her water bill and with that information call [phone] and request the utility bill assistance for the patient  Unfortunately patient did not answer the call  [facility] left a voice mail requesting a call back  [facility] will continue follow up with patient

## 2023-05-04 NOTE — PROGRESS NOTES
[facility] called the patient again to obtain more the information for her rent assistance  Patient informed [facility] the one of the family member help her with the bill already and she is working in to put the bill on her name for futures needs  Patient thanked [facility] for the services and understood that all the rent assistance are provide to the person on the bill  Patient has not other needs at this moment      At this time all Goals have been completed and [facility] will now be closing case and communicate with team

## 2023-05-05 ENCOUNTER — PATIENT OUTREACH (OUTPATIENT)
Dept: INTERNAL MEDICINE CLINIC | Facility: CLINIC | Age: 38
End: 2023-05-05

## 2023-05-05 NOTE — PROGRESS NOTES
SW has received an IN BASKET request from [employer] that pt already has received family help with[facility] and [facility] has closed the referral     Patient does not have any further questions, concerns, or other needs at this time  Patient has my contact # and PCP office # if needed  Social Work to remain available to assist as indicated  Please re-consult Social Work if needed

## 2023-05-31 DIAGNOSIS — J45.40 MODERATE PERSISTENT ASTHMA WITHOUT COMPLICATION: ICD-10-CM

## 2023-05-31 RX ORDER — FLUTICASONE PROPIONATE AND SALMETEROL XINAFOATE 45; 21 UG/1; UG/1
AEROSOL, METERED RESPIRATORY (INHALATION)
Qty: 12 G | Refills: 3 | Status: SHIPPED | OUTPATIENT
Start: 2023-05-31

## 2023-07-31 DIAGNOSIS — J45.40 MODERATE PERSISTENT ASTHMA WITHOUT COMPLICATION: ICD-10-CM

## 2023-07-31 RX ORDER — ALBUTEROL SULFATE 90 UG/1
AEROSOL, METERED RESPIRATORY (INHALATION)
Qty: 18 G | Refills: 3 | Status: SHIPPED | OUTPATIENT
Start: 2023-07-31

## 2023-08-30 ENCOUNTER — TELEPHONE (OUTPATIENT)
Dept: INTERNAL MEDICINE CLINIC | Facility: CLINIC | Age: 38
End: 2023-08-30

## 2023-08-30 NOTE — TELEPHONE ENCOUNTER
Folder Color-Red    Name of Form-PPL Form    Form to be filled out by-Attending    Form to be Faxed to [phone]    Patient made aware of 10 business day policy.

## 2023-09-04 DIAGNOSIS — G93.2 IDIOPATHIC INTRACRANIAL HYPERTENSION: ICD-10-CM

## 2023-09-05 RX ORDER — TOPIRAMATE 50 MG/1
TABLET, FILM COATED ORAL
Qty: 60 TABLET | Refills: 5 | Status: SHIPPED | OUTPATIENT
Start: 2023-09-05

## 2023-09-05 RX ORDER — ACETAZOLAMIDE 250 MG/1
TABLET ORAL
Qty: 60 TABLET | Refills: 5 | Status: SHIPPED | OUTPATIENT
Start: 2023-09-05

## 2023-09-13 ENCOUNTER — OFFICE VISIT (OUTPATIENT)
Dept: INTERNAL MEDICINE CLINIC | Facility: CLINIC | Age: 38
End: 2023-09-13

## 2023-09-13 VITALS
SYSTOLIC BLOOD PRESSURE: 143 MMHG | BODY MASS INDEX: 51.91 KG/M2 | WEIGHT: 293 LBS | HEART RATE: 66 BPM | DIASTOLIC BLOOD PRESSURE: 97 MMHG | HEIGHT: 63 IN | TEMPERATURE: 98 F

## 2023-09-13 DIAGNOSIS — G93.2 IDIOPATHIC INTRACRANIAL HYPERTENSION: ICD-10-CM

## 2023-09-13 DIAGNOSIS — E66.01 CLASS 3 SEVERE OBESITY WITH SERIOUS COMORBIDITY AND BODY MASS INDEX (BMI) OF 50.0 TO 59.9 IN ADULT, UNSPECIFIED OBESITY TYPE (HCC): ICD-10-CM

## 2023-09-13 DIAGNOSIS — R73.03 PREDIABETES: ICD-10-CM

## 2023-09-13 DIAGNOSIS — J45.40 MODERATE PERSISTENT ASTHMA WITHOUT COMPLICATION: ICD-10-CM

## 2023-09-13 DIAGNOSIS — D50.9 IRON DEFICIENCY ANEMIA, UNSPECIFIED IRON DEFICIENCY ANEMIA TYPE: ICD-10-CM

## 2023-09-13 DIAGNOSIS — Z00.00 ANNUAL PHYSICAL EXAM: Primary | ICD-10-CM

## 2023-09-13 NOTE — ASSESSMENT & PLAN NOTE
History of MARSHA. Pt currently endorses taking Ferrous Sulfate 324 mg every 2-3 days. Pt states iron pills make her have GI side effects and pt was counseled on starting Slow FE or Vegan Organic iron for adverse effects. If pt if started on slow FE and undergoes bariatric surgery pt must be told to avoid extended release medications. Most recent Iron Panel completed 3/25/2021: Iron 26, Ferritin 24, Iron Sat 7, and TIBC 379. Labs previously ordered in January never completed. Asymptomatic at this time and will send for repeat blood work to further access. · Encourage pt to complete previously ordered iron labs  · Continue iron pills.

## 2023-09-13 NOTE — PROGRESS NOTES
[address] [facility] GRADY    NAME: Jc Ramos  AGE: 45 y.o. SEX: female  : 1985     DATE: 2023     Assessment and Plan:     Problem List Items Addressed This Visit        Respiratory    Moderate persistent asthma without complication     Previously dx with mild intermittent asthma. Pt states she was without advair for a time and had worsening daily night time awakenings, and needed albuterol multiple times daily. 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 She also endorses wheezing that occurs with activity, limiting her ability to participate in activities with her children. No known exacerbations/ hospitalizations for asthma. Therefore, pt now qualifies as Moderate persistent asthma without complication. However, she appears reasonably controlled on current Advair / albuterol therapy. Pt BMI 56 also likely contributing to SOB with obesity hypoventilation syndrome    · Continue current Advair HFA 45-21, Albuterol prn  · Will consider increasing Advair HFA dosing if symptoms worsen / night time awakenings from coughing re-occur  · Encourage weight loss as below. Nervous and Auditory    Idiopathic intracranial hypertension     Pt on acetazolamide. Endorses increased urination which she attributes to Diamox. States continued / non-worsening headaches. · Continue acetazolamide            Other    Class 3 severe obesity with serious comorbidity and body mass index (BMI) of 50.0 to 59.9 in adult [facility])     Pt states she is has "tried the pills (topiramate)" and tried to eat healthy without any significant changes. Pt now seeking referral to weight management to discuss gastric sleeve procedure.  Pt was offered to start other weight loss medications ie Ozempic but pt not diabetic and pt has concerns over insurance coverage. Pt states she will wait to speak to weight management about other medications. Pt was counseled regarding the various bariatric surgeries as well as the possible expectations / risks and told to listen to the Weight Loss Surgery podcast to gain more information. Pt also counseled on following the "Mediterranean Diet."  Pt agreed to switch from white rice to Barley / brown rice and was happy to report she drinks lots of water and loves to eat vegetables. Pt admits to limited dedicated exercise but was encouraged to continue to play with kids / move as much as possible. · Weight Loss referral  · Encourage adherence to mediterranean diet  · Encourage switching from white rice to brown rice / barley  · Encourage 150 min exercise / week  · Plan to follow-up in ~4 months to discuss weight and current plans with weight loss referral.         Relevant Orders    Ambulatory Referral to Weight Management    Prediabetes     · F/u A1c ordered in January         Annual physical exam - Primary     Annual physical completed with counseling on weight loss / diet and exercise. Recommended following up with OBGYN and Dentist. Nagi Carias previously ordered in January have not been completed and pt recommended to get as soon as able. · F/u January labs  · F/u with OBGYN and Dentist  · Rest of Plan as noted under "Obesity"         Iron deficiency anemia     History of MARSHA. Pt currently endorses taking Ferrous Sulfate 324 mg every 2-3 days. Pt states iron pills make her have GI side effects and pt was counseled on starting Slow FE or Vegan Organic iron for adverse effects. If pt if started on slow FE and undergoes bariatric surgery pt must be told to avoid extended release medications. Most recent Iron Panel completed 3/25/2021: Iron 26, Ferritin 24, Iron Sat 7, and TIBC 379. Labs previously ordered in January never completed.  Asymptomatic at this time and will send for repeat blood work to further access. · Encourage pt to complete previously ordered iron labs  · Continue iron pills. Immunizations and preventive care screenings were discussed with patient today. Appropriate education was printed on patient's after visit summary. Counseling:  Dental Health: discussed importance of regular tooth brushing, flossing, and dental visits. Exercise: the importance of regular exercise/physical activity was discussed. Recommend exercise 3-5 times per week for at least 30 minutes. · Diet: the importance of following a Mediterranean diet / eating beans and brown rice / barley / whole grains         Return in about 4 months (around 1/1/2024) for Recheck. Chief Complaint:     Chief Complaint   Patient presents with   • Annual Exam      History of Present Illness:     Adult Annual Physical   Patient here for a comprehensive physical exam. The patient reports problems - cannot lose weight . Diet and Physical Activity  · Diet/Nutrition: well balanced diet and consuming 3-5 servings of fruits/vegetables daily. · Exercise: no formal exercise. Depression Screening  PHQ-2/9 Depression Screening         General Health  · Sleep: gets 1-3 hours of sleep on average. · Hearing: normal - bilateral.  · Vision: no vision problems. · Dental: no dental visits for >1 year. /GYN Health  · Last menstrual period: 9/5/2023  · Contraceptive method: s/p "tubes tided". · History of STDs?: no.     Review of Systems:     Review of Systems   Constitutional: Negative. Respiratory: Positive for cough, shortness of breath and wheezing. Wheezing / SOB with activity. Cough at night   Cardiovascular: Negative. Gastrointestinal: Positive for constipation and nausea. Nausea / constipation with iron pills   Allergic/Immunologic: Negative. Neurological: Positive for headaches. Psychiatric/Behavioral: Positive for sleep disturbance.         Only sleeps 3 hours d/t arm pain      Past Medical History:     Past Medical History:   Diagnosis Date   • Asthma       Past Surgical History:     Past Surgical History:   Procedure Laterality Date   •  SECTION     • FL LUMBAR PUNCTURE DIAGNOSTIC  2022      Social History:     Social History     Socioeconomic History   • Marital status: /Civil Union     Spouse name: None   • Number of children: None   • Years of education: None   • Highest education level: None   Occupational History   • None   Tobacco Use   • Smoking status: Never   • Smokeless tobacco: Never   Vaping Use   • Vaping Use: Never used   Substance and Sexual Activity   • Alcohol use: No   • Drug use: No   • Sexual activity: Yes   Other Topics Concern   • None   Social History Narrative    Social problem      Social Determinants of Health     Financial Resource Strain: Low Risk  (2023)    Overall Financial Resource Strain (CARDIA)    • Difficulty of Paying Living Expenses: Not hard at all   Food Insecurity: Food Insecurity Present (2023)    Hunger Vital Sign    • Worried About Running Out of Food in the Last Year: Sometimes true    • Ran Out of Food in the Last Year: Sometimes true   Transportation Needs: No Transportation Needs (2023)    PRAPARE - Transportation    • Lack of Transportation (Medical): No    • Lack of Transportation (Non-Medical): No   Physical Activity: Inactive (2021)    Exercise Vital Sign    • Days of Exercise per Week: 0 days    • Minutes of Exercise per Session: 0 min   Stress: Stress Concern Present (2021)    [facility]    • Feeling of Stress : To some extent   Social Connections:  Moderately Isolated (2021)    Social Connection and Isolation Panel [NHANES]    • Frequency of Communication with Friends and Family: More than three times a week    • Frequency of Social Gatherings with Friends and Family: More than three times a week    • Attends Buddhist Services: Never    • Active Member of Clubs or Organizations: No    • Attends Club or Organization Meetings: Never    • Marital Status:    Intimate Partner Violence: Not At Risk (9/29/2021)    Humiliation, Afraid, Rape, and Kick questionnaire    • Fear of Current or Ex-Partner: No    • Emotionally Abused: No    • Physically Abused: No    • Sexually Abused: No   Housing Stability: Low Risk  (1/9/2023)    Housing Stability Vital Sign    • Unable to Pay for Housing in the Last Year: No    • Number of Places Lived in the Last Year: 1    • Unstable Housing in the Last Year: No      Family History:     Family History   Problem Relation Age of Onset   • No Known Problems Other    • Arthritis Mother    • Fibromyalgia Mother    • Diabetes Family    • Cancer Family    • Heart disease Family       Current Medications:     Current Outpatient Medications   Medication Sig Dispense Refill   • acetaZOLAMIDE (DIAMOX) 250 mg tablet TAKE 2 TABLETS BY MOUTH EVERY MORNING 60 tablet 5   • Advair HFA 45-21 MCG/ACT inhaler INHALE 2 PUFFS BY MOUTH 2 TIMES A DAY RINSE MOUTH AFTER USE. 12 g 3   • albuterol (2.5 mg/3 mL) 0.083 % nebulizer solution Take 3 mL (2.5 mg total) by nebulization every 6 (six) hours as needed for wheezing or shortness of breath 75 mL 1   • albuterol (PROVENTIL HFA,VENTOLIN HFA) 90 mcg/act inhaler INHALE 1 PUFF EVERY 4 HOURS AS NEEDED FOR WHEEZING OR SHORTNESS OF BREATH. 18 g 3   • fluticasone-salmeterol (Advair) 500-50 mcg/dose inhaler Inhale 1 puff 2 (two) times a day     • gabapentin (NEURONTIN) 100 mg capsule Take 1 capsule (100 mg total) by mouth 3 (three) times a day 30 capsule 2   • indomethacin (INDOCIN) 25 mg capsule TAKE 1 TO 2 CAPSULES BY MOUTH EVERY 8 HOURS AS NEEDED FOR SEVERE HEADACHE WITH FOOD 15 capsule 1   • pantoprazole (PROTONIX) 40 mg tablet TAKE 1 TABLET BY MOUTH EVERY DAY 90 tablet 3   • topiramate (TOPAMAX) 50 MG tablet TAKE 1 AND 1/2 TABLETS (75MG) AT BEDTIME FOR 1 WEEK THEN TAKE 2 TABLETS (100MG) AT BEDTIME 60 tablet 5   • ferrous sulfate 324 (65 Fe) mg Take 1 tablet (324 mg total) by mouth every other day (Patient not taking: Reported on 1/9/2023) 30 tablet 3     No current facility-administered medications for this visit. Allergies:     No Known Allergies   Physical Exam:     /97 (BP Location: Right arm, Patient Position: Sitting, Cuff Size: Large)   Pulse 66   Temp 98 °F (36.7 °C) (Temporal)   Ht 5' 3" (1.6 m)   Wt (!) 144 kg (317 lb)   BMI 56.15 kg/m²     Physical Exam  Constitutional:       General: She is not in acute distress. Appearance: She is not ill-appearing or toxic-appearing. HENT:      Mouth/Throat:      Mouth: Mucous membranes are dry. Pharynx: Oropharynx is clear. Cardiovascular:      Rate and Rhythm: Normal rate and regular rhythm. Heart sounds: No murmur heard. Pulmonary:      Effort: Pulmonary effort is normal.      Breath sounds: Normal breath sounds. No wheezing, rhonchi or rales. Abdominal:      General: Bowel sounds are normal.      Palpations: There is no mass. Tenderness: There is no abdominal tenderness. There is no guarding. Musculoskeletal:      Cervical back: Neck supple. No tenderness. Lymphadenopathy:      Cervical: No cervical adenopathy. Neurological:      Mental Status: She is oriented to person, place, and time. Mental status is at baseline. Psychiatric:         Mood and Affect: Mood normal.         Behavior: Behavior normal.         Thought Content:  Thought content normal.         Judgment: Judgment normal.          Joemoregabbie DO Jairo   [address] [place] [address]

## 2023-09-13 NOTE — ASSESSMENT & PLAN NOTE
Pt states she is has "tried the pills (topiramate)" and tried to eat healthy without any significant changes. Pt now seeking referral to weight management to discuss gastric sleeve procedure. Pt was offered to start other weight loss medications ie Ozempic but pt not diabetic and pt has concerns over insurance coverage. Pt states she will wait to speak to weight management about other medications. Pt was counseled regarding the various bariatric surgeries as well as the possible expectations / risks and told to listen to the Weight Loss Surgery podcast to gain more information. Pt also counseled on following the "Mediterranean Diet."  Pt agreed to switch from white rice to Barley / brown rice and was happy to report she drinks lots of water and loves to eat vegetables. Pt admits to limited dedicated exercise but was encouraged to continue to play with kids / move as much as possible.     · Weight Loss referral  · Encourage adherence to mediterranean diet  · Encourage switching from white rice to brown rice / barley  · Encourage 150 min exercise / week  · Plan to follow-up in ~4 months to discuss weight and current plans with weight loss referral.

## 2023-09-13 NOTE — ASSESSMENT & PLAN NOTE
Previously dx with mild intermittent asthma. Pt states she was without advair for a time and had worsening daily night time awakenings, and needed albuterol multiple times daily. 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 She also endorses wheezing that occurs with activity, limiting her ability to participate in activities with her children. No known exacerbations/ hospitalizations for asthma. Therefore, pt now qualifies as Moderate persistent asthma without complication. However, she appears reasonably controlled on current Advair / albuterol therapy. Pt BMI 56 also likely contributing to SOB with obesity hypoventilation syndrome    · Continue current Advair HFA 45-21, Albuterol prn  · Will consider increasing Advair HFA dosing if symptoms worsen / night time awakenings from coughing re-occur  · Encourage weight loss as below.

## 2023-09-13 NOTE — ASSESSMENT & PLAN NOTE
Annual physical completed with counseling on weight loss / diet and exercise. Recommended following up with OBGYN and Dentist. Shannon Hanson previously ordered in January have not been completed and pt recommended to get as soon as able.     · F/u January labs  · F/u with OBGYN and Dentist  · Rest of Plan as noted under "Obesity"

## 2023-09-13 NOTE — ASSESSMENT & PLAN NOTE
Pt on acetazolamide. Endorses increased urination which she attributes to Diamox. States continued / non-worsening headaches.     · Continue acetazolamide

## 2023-09-13 NOTE — LETTER
To whom it may concern,    Collin Alvarado is being treated at [facility] for the following medical conditions: Idiopathic Intercranial Pressure, Mild intermittent asthma. She will require continued follow-up for these conditions.     Grey Ferrer DO  09/13/23 3:15 PM

## 2023-10-03 ENCOUNTER — OFFICE VISIT (OUTPATIENT)
Dept: BARIATRICS | Facility: CLINIC | Age: 38
End: 2023-10-03

## 2023-10-03 VITALS
SYSTOLIC BLOOD PRESSURE: 124 MMHG | TEMPERATURE: 97.6 F | HEART RATE: 67 BPM | BODY MASS INDEX: 50.02 KG/M2 | DIASTOLIC BLOOD PRESSURE: 80 MMHG | HEIGHT: 64 IN | WEIGHT: 293 LBS

## 2023-10-03 DIAGNOSIS — E66.01 CLASS 3 SEVERE OBESITY WITH SERIOUS COMORBIDITY AND BODY MASS INDEX (BMI) OF 50.0 TO 59.9 IN ADULT, UNSPECIFIED OBESITY TYPE (HCC): ICD-10-CM

## 2023-10-03 DIAGNOSIS — E66.01 MORBID (SEVERE) OBESITY DUE TO EXCESS CALORIES (HCC): Primary | ICD-10-CM

## 2023-10-03 PROCEDURE — RECHECK: Performed by: DIETITIAN, REGISTERED

## 2023-10-03 NOTE — PROGRESS NOTES
Bariatric Nutrition Assessment Note    Type of surgery    Preop  Surgery Date: Pt has 6 month program requirement     Surgeon: Dr. Dee Brink  45 y.o.  female     Wt with BMI of 25: 142.8#  Pre-Op Excess Wt: 174.7#  Ht 5' 3.5" (1.613 m)   Wt (!) 144 kg (317 lb 8 oz)   BMI 55.36 kg/m²      NAFLD Fibrosis Score cannot be calculated.  One or more of the required components has not been resulted in the past year      [address] Equation:  2263 calories per day   Estimated calories for weight loss 0151-6631 calories per day  ( 1-2# per wk wt loss - sedentary )  Estimated protein needs 65-97 g/d (1.0-1.5 gms/kg IBW )   Estimated fluid needs 64.9-76 ounces per day (30-35 ml/kg IBW )      Weight History   Onset of Obesity: Adult  Family history of obesity: No  Wt Loss Attempts: Counseling with  MD, prescribed medication   Portion control and increased activity, limiting her carbohydrate intake   Patient has tried the above for 6 months or more with insufficient weight loss or weight regain, which is why patient has requested to be evaluated for weight loss surgery today  Maximum Wt Lost: size 26 to size 18 over past one and a half years       Review of History and Medications   Past Medical History:   Diagnosis Date   • Asthma      Past Surgical History:   Procedure Laterality Date   •  SECTION     • FL LUMBAR PUNCTURE DIAGNOSTIC  2022     Social History     Socioeconomic History   • Marital status: /Civil Union     Spouse name: Not on file   • Number of children: Not on file   • Years of education: Not on file   • Highest education level: Not on file   Occupational History   • Not on file   Tobacco Use   • Smoking status: Never   • Smokeless tobacco: Never   Vaping Use   • Vaping Use: Never used   Substance and Sexual Activity   • Alcohol use: No   • Drug use: No   • Sexual activity: Yes   Other Topics Concern   • Not on file   Social History Narrative    Social problem      Social Determinants of Health     Financial Resource Strain: Low Risk  (1/9/2023)    Overall Financial Resource Strain (CARDIA)    • Difficulty of Paying Living Expenses: Not hard at all   Food Insecurity: Food Insecurity Present (1/9/2023)    Hunger Vital Sign    • Worried About Running Out of Food in the Last Year: Sometimes true    • Ran Out of Food in the Last Year: Sometimes true   Transportation Needs: No Transportation Needs (1/9/2023)    PRAPARE - Transportation    • Lack of Transportation (Medical): No    • Lack of Transportation (Non-Medical): No   Physical Activity: Inactive (9/29/2021)    Exercise Vital Sign    • Days of Exercise per Week: 0 days    • Minutes of Exercise per Session: 0 min   Stress: Stress Concern Present (9/29/2021)    [facility]    • Feeling of Stress : To some extent   Social Connections:  Moderately Isolated (9/29/2021)    Social Connection and Isolation Panel [NHANES]    • Frequency of Communication with Friends and Family: More than three times a week    • Frequency of Social Gatherings with Friends and Family: More than three times a week    • Attends Cheondoism Services: Never    • Active Member of Clubs or Organizations: No    • Attends Club or Organization Meetings: Never    • Marital Status:    Intimate Partner Violence: Not At Risk (9/29/2021)    Humiliation, Afraid, Rape, and Kick questionnaire    • Fear of Current or Ex-Partner: No    • Emotionally Abused: No    • Physically Abused: No    • Sexually Abused: No   Housing Stability: [address]  (1/9/2023)    Housing Stability Vital Sign    • Unable to Pay for Housing in the Last Year: No    • Number of Places Lived in the Last Year: 1    • Unstable Housing in the Last Year: No       Current Outpatient Medications:   •  acetaZOLAMIDE (DIAMOX) 250 mg tablet, TAKE 2 TABLETS BY MOUTH EVERY MORNING, Disp: 60 tablet, Rfl: 5  • Advair HFA 45-21 MCG/ACT inhaler, INHALE 2 PUFFS BY MOUTH 2 TIMES A DAY RINSE MOUTH AFTER USE., Disp: 12 g, Rfl: 3  •  albuterol (2.5 mg/3 mL) 0.083 % nebulizer solution, Take 3 mL (2.5 mg total) by nebulization every 6 (six) hours as needed for wheezing or shortness of breath, Disp: 75 mL, Rfl: 1  •  albuterol (PROVENTIL HFA,VENTOLIN HFA) 90 mcg/act inhaler, INHALE 1 PUFF EVERY 4 HOURS AS NEEDED FOR WHEEZING OR SHORTNESS OF BREATH., Disp: 18 g, Rfl: 3  •  ferrous sulfate 324 (65 Fe) mg, Take 1 tablet (324 mg total) by mouth every other day (Patient not taking: Reported on 1/9/2023), Disp: 30 tablet, Rfl: 3  •  fluticasone-salmeterol (Advair) 500-50 mcg/dose inhaler, Inhale 1 puff 2 (two) times a day, Disp: , Rfl:   •  gabapentin (NEURONTIN) 100 mg capsule, Take 1 capsule (100 mg total) by mouth 3 (three) times a day, Disp: 30 capsule, Rfl: 2  •  indomethacin (INDOCIN) 25 mg capsule, TAKE 1 TO 2 CAPSULES BY MOUTH EVERY 8 HOURS AS NEEDED FOR SEVERE HEADACHE WITH FOOD, Disp: 15 capsule, Rfl: 1  •  pantoprazole (PROTONIX) 40 mg tablet, TAKE 1 TABLET BY MOUTH EVERY DAY, Disp: 90 tablet, Rfl: 3  •  topiramate (TOPAMAX) 50 MG tablet, TAKE 1 AND 1/2 TABLETS (75MG) AT BEDTIME FOR 1 WEEK THEN TAKE 2 TABLETS (100MG) AT BEDTIME, Disp: 60 tablet, Rfl: 5     She is taking iron 3 days per week     Food Intake and Lifestyle Assessment   Food Intake Assessment completed via usual diet recall  Breakfast:  9/ 9:30 am Coffee or sometimes a nutrigrain bar or yogurt with granola   Cold coffee with cream and sugar from [employer] (20-24 ounces) - sips all day   Snack: 0   Lunch: 1 pm - packs and take out - trying to pack lunch more   Take out once per week - can be a variety of things with her co workers   [employer] 4 x per week   Will pack fruit , granola and yogurt , sandwich ( turkey and cheese ) bottle of water or juice and banana or strawberries   Snack: 0  Dinner: 6/6:30 pm   Rice/ chicken / seldom does pork/ always includes a vegetable ( steamer bags ) or salad   Snack: seldom   Beverage intake: water, juice and coffee/tea  Protein supplement: none   Estimated protein intake per day: ~50 grams per day   Estimated fluid intake per day: " not enough"  Drinks a little of water or juice or coffee  Seldomly finishes a bottle. 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   Does all housework   Tracks her steps and meets her goal most day   Will set to 6000 per day   Current physical limitations: SOB- asthma, need inhaler, extreme headaches with pseudo tumor     Psychosocial Assessment   Support systems: mother   Socioeconomic factors: works 28 to 40 hours per week   Works 9 to 5 or 10 to 5 - 6 day work week   Sleep: 12 midnight and up at 6 am - sometimes on weekend will sleep to 7:30 am      Nutrition Diagnosis  Diagnosis: Overweight / Obesity (NC-3.3)  Related to: Food and nutrition-related knowledge deficit, Physical inactivity and Excessive energy intake  As Evidenced by: BMI >25 and Unintentional weight gain     Nutrition Prescription: Recommend the following diet  2000 calories/ 100 grams of carb     Interventions and Teaching   Discussed pre-op and post-op nutrition guidelines. Patient educated and handouts provided.   Surgical changes to stomach / GI  Capacity of post-surgery stomach  Diet progression  Adequate hydration  Sugar and fat restriction to decrease "dumping syndrome"  Fat restriction to decrease steatorrhea  Expected weight loss  Weight loss plateaus/ possibility of weight regain  Exercise  Suggestions for pre-op diet  Nutrition considerations after surgery  Protein supplements  Meal planning and preparation  Appropriate carbohydrate, protein, and fat intake, and food/fluid choices to maximize safe weight loss, nutrient intake, and tolerance   Dietary and lifestyle changes  Possible problems with poor eating habits  Intuitive eating  Techniques for self monitoring and keeping daily food journal  Potential for food intolerance after surgery, and ways to deal with them including: lactose intolerance, nausea, reflux, vomiting, diarrhea, food intolerance, appetite changes, gas  Vitamin / Mineral supplementation of Multivitamin with minerals, Calcium, Vitamin B12, Iron, Fat Soluble vitamins and Vitamin D    Patient is not currently pregnant and doesn't desire to become pregnant a minimum of one year post-op    Education provided to: patient    Barriers to learning: No barriers identified    Readiness to change: preparation    Prior research on procedure: discussed with provider and pre-op class    Comprehension: needs reinforcement and verbalizes understanding     Expected Compliance: good  Recommendations  Pt is an appropriate candidate for surgery.  Yes    Evaluation / Monitoring  Dietitian to Monitor: Eating pattern as discussed Tolerance of nutrition prescription Body weight Lab values Physical activity Bowel pattern    Goals  Food journal, Complete lession plans 1-6, Eat 3 meals per day and Eliminate mindless snacking   Get blood work done  Try a shake for breakfast and eat 2 meals   Food log 2000 calories or less per day   Recommend an adult multivitamin and mineral and 2000 IU of vitamin D3 per day   Slowly increase fluid intake   Track steps 6000 or more per day     Time Spent:   45 Minutes

## 2023-10-03 NOTE — PATIENT INSTRUCTIONS
Get blood work done  Try a shake for breakfast and eat 2 meals   Food log 2000 calories or less per day   Recommend an adult multivitamin and mineral and 2000 IU of vitamin D3 per day   Slowly increase fluid intake

## 2023-10-03 NOTE — PROGRESS NOTES
Bariatric Behavioral Health Evaluation    Presenting Problem:   Bola Puga  is a 45 y.o.   female    :  1985   Patient presented with overall concerns of obesity. Stated that weight has impacted quality of life and has current/ future concerns with lack of mobility,/ movement,  chronic pain, and overall health. Has attempted various weight loss plans in the past including water pills, portion control, calorie counting. Patient is Interested in exploring bariatric surgery. as an option for  weight loss goals. Is the patient seeking Bariatric Surgery? YES  Has been discussing with PCP. Knows a lot of people who are post surgery. Friend from [place] had the surgery - told Jonathan Staples that she does not regret it. Jonathan Staples is considering the sleeve. Realizes Post- Op Requirements? Yes, and will learn more meeting with the dietitians and social workers through the process     Pre-morbid level of function and history of present illness:   Gained 60 #. SOB, Asthma. Stressors and Supports: Mother is primary support.  is indifferent regarding the surgery     Living situation: mother in the home    is in the home  Children in the home. 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 Six days a week. Brings her food choices to work   Minimal ordering out. Physical Activity: limited due to health     Family History (medical, traditions, culture, rules/routines around food):      No identified weight concerns with family  Brother: mental health     [address] [place] [address], Trauma and Substance use Assessment    Psychiatric/Psychological Treatment Diagnosis:    Chart has Anxiety and Depression. Denies Depression. Anxiety on occasion. Uses her inhaler for coping with her anxiety.      History of Eating Disorder:  None      Outpatient Counselor : No      Psychiatrist:  No     Have you had any Mental Health Higher level of care (ED, PHP or Inpatient ) Treatment? No      Drug and/or Alcohol use and treatment history:  none    Have you had any Substance Use Treatment? No     Tobacco/Vaping History: none   Patient agrees to remain nicotine free post surgery. Domestic Violence: No    Abuse or Trauma History:  none    Risk Assessment    Identified support system intact. Risk of harm to self or others:  none noted during evaluation  No HI/SI      Presence of Audio/Visual Hallucinations: Not reported during evaluation     Access to weapons: Not reported during evaluation     Observation: this interview only (SW and RD will follow Lendel Patch through the bariatric program)     Based on the previous information, the client presents the following risk of harm to self or others:  Low risk        Physical/Mental Health Status:              Appearance: appropriate           Sociability: average           Affect: appropriate           Mood: calm           Thought Process: coherent           Speech: normal           Content: no impairment           Orientation: person  Yes , place  Yes , time  Yes , normal attention span  Yes , normal memory  Yes   and normal judgement  Yes            Insight: emotional  good       BARIATRIC SURGERY EDUCATION CHECKLIST    Patient has received the following education related to the bariatric surgery process and understands:    Patients may be required to complete a psychiatric evaluation and receive clearance for surgery from mental health provider. Patients who undergo weight loss surgery are at higher risk of increased mental health concerns and suicide attempts. Patients may be required to complete a full substance abuse evaluation and then complete all treatment recommendations prior to surgery. If diagnosis of abuse/dependence results, patient may be required to remain sober for one (1) year before having bariatric surgery.     Patients on psychiatric medications should check with their provider to discuss psychiatric medications and the changes in absorption. Patient should discuss all time release medications with provider and take all medications as prescribed. The recommendation is that there is no use of any tobacco products, Hookah or vapes for the bariatric post-operation patient. Bariatric surgery patients should not consume alcohol as a post-operative patient as it may increase risk of numerous health conditions including but not limited to alcohol abuse and ulcers. There is a possibility of weight regain if patient does not follow all program guidelines and recommendations. Bariatric surgery patients should exercise thirty (30) to sixty (60) minutes per day to maintain post-surgical weight loss. Research indicates that bariatric patients are more successful when they see a therapist for up to two (2) years post-op. Patients will follow all medical and dietary recommendations provided. Patient will keep all scheduled appointments and follow up with their physician for a minimum of five (5) years. Patient will take all vitamins as recommended. Post-operative vitamins are life-long. There is a goal month set. All requirements should be met by this time. Don't wait to get started! There is a deadline month set. All requirements must be finished by this time and if not, the patient will be halted in the surgery process. The patient can be referred to the medical weight management program or can come back to the surgical program once the unfinished tasks from the previous program are completed. Female patients of childbearing years are informed that pregnancy is not recommended until 12 - 18 months post-op. Recommendations: Recommended for surgery  yes    Social Service Note:  Patient presented for behavioral health evaluation for the bariatric program.   Negative for Mental Health. No history of therapy.    No reported history of Psychiatry. No reported history of Drug and/or Alcohol abuse or treatment. No tobacco use. Patient educated regarding the impact of nicotine and alcohol on the post surgery bariatric patient. Patient has a negative family history of tobacco and alcohol addiction. Patient has not reported contraindications for bariatric surgery. Patient will begin making changes with relationship with food through behavior modifications and mindful techniques. Tracking food intake for one week every three months can assist with weight maintenance and self accountability for post surgery success.  and Dietitian follow up visits are available for pre and post surgery support. Bariatric support group is available monthly. Patient verbalized the ability to start making changes and create a healthy relationship around nutrition. Patient meets criteria for surgery at this time and is referred to the bariatric surgeon.         Belkis Perry LCSW

## 2023-10-09 ENCOUNTER — APPOINTMENT (EMERGENCY)
Dept: RADIOLOGY | Facility: HOSPITAL | Age: 38
End: 2023-10-09

## 2023-10-09 ENCOUNTER — HOSPITAL ENCOUNTER (EMERGENCY)
Facility: HOSPITAL | Age: 38
Discharge: HOME | End: 2023-10-09
Attending: EMERGENCY MEDICINE

## 2023-10-09 VITALS
OXYGEN SATURATION: 98 % | RESPIRATION RATE: 17 BRPM | SYSTOLIC BLOOD PRESSURE: 157 MMHG | DIASTOLIC BLOOD PRESSURE: 107 MMHG | HEART RATE: 78 BPM | TEMPERATURE: 97.6 F

## 2023-10-09 DIAGNOSIS — R05.9 COUGH: Primary | ICD-10-CM

## 2023-10-09 DIAGNOSIS — J06.9 URI (UPPER RESPIRATORY INFECTION): ICD-10-CM

## 2023-10-09 LAB
ATRIAL RATE: 76 BPM
P AXIS: 35 DEGREES
PR INTERVAL: 152 MS
QRS AXIS: 41 DEGREES
QRSD INTERVAL: 90 MS
QT INTERVAL: 402 MS
QTC INTERVAL: 452 MS
T WAVE AXIS: 35 DEGREES
VENTRICULAR RATE: 76 BPM

## 2023-10-09 PROCEDURE — 93005 ELECTROCARDIOGRAM TRACING: CPT

## 2023-10-09 PROCEDURE — 99284 EMERGENCY DEPT VISIT MOD MDM: CPT | Performed by: EMERGENCY MEDICINE

## 2023-10-09 PROCEDURE — 71046 X-RAY EXAM CHEST 2 VIEWS: CPT

## 2023-10-09 PROCEDURE — 93010 ELECTROCARDIOGRAM REPORT: CPT | Performed by: INTERNAL MEDICINE

## 2023-10-09 PROCEDURE — 99283 EMERGENCY DEPT VISIT LOW MDM: CPT

## 2023-10-09 RX ORDER — PREDNISONE 20 MG/1
60 TABLET ORAL DAILY
Qty: 15 TABLET | Refills: 0 | Status: SHIPPED | OUTPATIENT
Start: 2023-10-09 | End: 2023-10-14

## 2023-10-09 NOTE — Clinical Note
Neema Richter was seen and treated in our emergency department on 10/9/2023. Diagnosis:     Cindy  may return to work on return date. She may return on this date: 10/09/2023         If you have any questions or concerns, please don't hesitate to call.       Javi Krause MD    ______________________________           _______________          _______________  Hospital Representative                              Date                                Time

## 2023-10-09 NOTE — DISCHARGE INSTRUCTIONS
You were seen today for cough and viral symptoms. Your chest xray did not show any evidence of pneumonia. Given your shortness of breath and history of asthma, you have been prescribed a five day course of steroids. Please take 60mg prednisone daily for five days. Please return to the ER if you have persistently worsening shortness of breath, chest pain, or persistent fevers.

## 2023-10-09 NOTE — ED ATTENDING ATTESTATION
10/9/2023  IRaisa MD, saw and evaluated the patient. I have discussed the patient with the resident/non-physician practitioner and agree with the resident's/non-physician practitioner's findings, Plan of Care, and MDM as documented in the resident's/non-physician practitioner's note, except where noted. All available labs and Radiology studies were reviewed. I was present for key portions of any procedure(s) performed by the resident/non-physician practitioner and I was immediately available to provide assistance. At this point I agree with the current assessment done in the Emergency Department. I have conducted an independent evaluation of this patient a history and physical is as follows:    ED Course     45 y.o. female, history of asthma, presenting with 4 day history of cough with sore throat and subjective fever. Pt. Reports rattling in the chest.      EXAM:  GENERAL: Well developed, nontoxic, no acute respiratory distress. HEENT: Head normocephalic, atraumatic. EYES: Eyelids and conjunctiva normal. PERRL, EOMI. ORAL: MMM, no pharyngeal exudate. NECK: NT, supple without meningismus. LUNGS: CTA bilaterally without W/R/R.  HEART: RRR without M/R/G. ABD: Soft, NT to palpation. No rebound or guarding. EXT: Unremarkable with apparent full ROM. NEURO: GCS 15, Motor 5/5 bilateral upper and lower extremities. SKIN: Warm and dry without rash. Viral URI, CXR to evaluate for pneumonia. XR chest 2 views   Final Result      No acute cardiopulmonary disease.       Findings are stable            Workstation performed: [id number]               Critical Care Time  Procedures

## 2023-10-30 ENCOUNTER — TELEPHONE (OUTPATIENT)
Dept: BARIATRICS | Facility: CLINIC | Age: 38
End: 2023-10-30

## 2023-10-30 NOTE — TELEPHONE ENCOUNTER
LVM to conform appt on 11/3 with Dr Collette Hidden so a Jen Kida can be set up.  Left office info to call back [phone]

## 2023-11-03 ENCOUNTER — CONSULT (OUTPATIENT)
Dept: BARIATRICS | Facility: CLINIC | Age: 38
End: 2023-11-03

## 2023-11-03 VITALS
HEIGHT: 64 IN | BODY MASS INDEX: 50.02 KG/M2 | SYSTOLIC BLOOD PRESSURE: 120 MMHG | TEMPERATURE: 97.7 F | DIASTOLIC BLOOD PRESSURE: 84 MMHG | HEART RATE: 75 BPM | WEIGHT: 293 LBS

## 2023-11-03 DIAGNOSIS — F32.A DEPRESSION: ICD-10-CM

## 2023-11-03 DIAGNOSIS — F41.9 ANXIETY: ICD-10-CM

## 2023-11-03 DIAGNOSIS — R73.03 PREDIABETES: ICD-10-CM

## 2023-11-03 DIAGNOSIS — E66.01 CLASS 3 SEVERE OBESITY WITH SERIOUS COMORBIDITY AND BODY MASS INDEX (BMI) OF 50.0 TO 59.9 IN ADULT (HCC): Primary | ICD-10-CM

## 2023-11-03 DIAGNOSIS — G93.2 IDIOPATHIC INTRACRANIAL HYPERTENSION: ICD-10-CM

## 2023-11-03 DIAGNOSIS — J45.40 MODERATE PERSISTENT ASTHMA WITHOUT COMPLICATION: ICD-10-CM

## 2023-11-03 DIAGNOSIS — G43.719 INTRACTABLE CHRONIC MIGRAINE WITHOUT AURA AND WITHOUT STATUS MIGRAINOSUS: ICD-10-CM

## 2023-11-03 PROCEDURE — 99204 OFFICE O/P NEW MOD 45 MIN: CPT | Performed by: SURGERY

## 2023-11-03 NOTE — PROGRESS NOTES
BARIATRIC INITIAL CONSULT - BARIATRIC SURGERY    Cindy Dunn 45 y.o. female MRN: [id number]  Unit/Bed#:  Encounter: [id number]      HPI:  Lex Jacinto is a 45 y.o. female who presents with a longstanding history of morbid obesity and inability to sustain a meaningful weight loss. Here today to discuss bariatric options. Visit type: initial visit    Symptoms: excess weight and inability to loss weight    Associated Symptoms: depressed mood and anxiety    Associated Conditions: glucose intolerance and abdominal obesity  Disease Complications:  Prediabetes and idiopathic intracranial hypertension with history of migraines  Weight Loss Interest: high  Previous Diet Trials: low calorie     Exercise Frequency:infrequency  Types of Exercise: walking        Review of Systems   All other systems reviewed and are negative. Historical Information   Past Medical History:   Diagnosis Date    Asthma      Past Surgical History:   Procedure Laterality Date     SECTION      FL LUMBAR PUNCTURE DIAGNOSTIC  2022     Social History   Social History     Substance and Sexual Activity   Alcohol Use No     Social History     Substance and Sexual Activity   Drug Use No     Social History     Tobacco Use   Smoking Status Never   Smokeless Tobacco Never     Family History: non-contributory    Meds/Allergies   all medications and allergies reviewed  No Known Allergies    Objective       Current Vitals:   Blood Pressure: 120/84 (23 1127)  Pulse: 75 (23 1127)  Temperature: 97.7 °F (36.5 °C) (23 112)  Temp Source: Tympanic (23)  Height: 5' 3.5" (161.3 cm) (23 112)  Weight - Scale: (!) 143 kg (315 lb 8 oz) (23 112)        Invasive Devices       Peripheral Intravenous Line  Duration             Peripheral IV 22 Left;Ventral (anterior) Forearm 325 days                    Physical Exam  Vitals reviewed.    Constitutional:       General: She is not in acute distress. Appearance: She is well-developed. She is not diaphoretic. HENT:      Head: Normocephalic and atraumatic. Right Ear: External ear normal.      Left Ear: External ear normal.      Nose: Nose normal.   Eyes:      General: No scleral icterus. Right eye: No discharge. Left eye: No discharge. Conjunctiva/sclera: Conjunctivae normal.   Neurological:      Mental Status: She is alert and oriented to person, place, and time. Psychiatric:         Behavior: Behavior normal.         Thought Content: Thought content normal.         Judgment: Judgment normal.         Lab Results: I have personally reviewed pertinent lab results. Imaging: I have personally reviewed pertinent reports. EKG, Pathology, and Other Studies: I have personally reviewed pertinent reports. Assessment/PLAN:    45 y.o. female with a long standing h/o of obesity and inability to sustain any meaningful weight loss on her own despite several attempts. She is interested in the Laparoscopic sleeve gastrectomy. She knows many people that had the procedure done including her sister and coworkers close to her that have been very successful in the past.  I have discussed both options with her today. As a part of her pre op evaluation, she will be referred to a cardiologist for risk stratification and for a sleep evaluation and consult to rule out obstructive sleep apnea if deemed necessary based on her score. She needs an EGD to evaluate the anatomy of her GI tract prior to the operation. I have spent over 30 minutes with her face to face in the office today discussing her options and details of the surgery. We have seen an animation of the surgery on the computer that illustrates how the operation is done and how the anatomy will be altered with the procedure. Over 50% of this was coordinating care.     I have used the [facility] bariatric surgical risk/benefit calculator and we have discussed the results as part of the preop education. She was given the opportunity to ask questions and I have answered all of them. I have discussed and educated the patient with regards to the components of our multidisciplinary program and the importance of compliance and follow up in the post operative period. The patient was also instructed with regards to the importance of behavior modification, nutritional counseling, support meeting attendance and lifestyle changes that are important to ensure success. Although there is a great statistical chance of improvement or even resolution of most of her associated comorbidities, the results vary from patient to patient and they largely depend on her commitment and compliance. She is 5'3" and weighs about 320 pounds with a BMI of 55. I have encouraged her to lose ideally 20 to 30 pounds prior to the surgery.       Wili Almeida MD  11/3/2023  11:30 AM

## 2023-11-03 NOTE — H&P (VIEW-ONLY)
BARIATRIC INITIAL CONSULT - BARIATRIC SURGERY    Cindy Brown 45 y.o. female MRN: [id number]  Unit/Bed#:  Encounter: [id number]      HPI:  Torey Massey is a 45 y.o. female who presents with a longstanding history of morbid obesity and inability to sustain a meaningful weight loss. Here today to discuss bariatric options. Visit type: initial visit    Symptoms: excess weight and inability to loss weight    Associated Symptoms: depressed mood and anxiety    Associated Conditions: glucose intolerance and abdominal obesity  Disease Complications:  Prediabetes and idiopathic intracranial hypertension with history of migraines  Weight Loss Interest: high  Previous Diet Trials: low calorie     Exercise Frequency:infrequency  Types of Exercise: walking        Review of Systems   All other systems reviewed and are negative. Historical Information   Past Medical History:   Diagnosis Date    Asthma      Past Surgical History:   Procedure Laterality Date     SECTION      FL LUMBAR PUNCTURE DIAGNOSTIC  2022     Social History   Social History     Substance and Sexual Activity   Alcohol Use No     Social History     Substance and Sexual Activity   Drug Use No     Social History     Tobacco Use   Smoking Status Never   Smokeless Tobacco Never     Family History: non-contributory    Meds/Allergies   all medications and allergies reviewed  No Known Allergies    Objective       Current Vitals:   Blood Pressure: 120/84 (23 1127)  Pulse: 75 (23 1127)  Temperature: 97.7 °F (36.5 °C) (23 112)  Temp Source: Tympanic (23)  Height: 5' 3.5" (161.3 cm) (23)  Weight - Scale: (!) 143 kg (315 lb 8 oz) (23 112)        Invasive Devices       Peripheral Intravenous Line  Duration             Peripheral IV 22 Left;Ventral (anterior) Forearm 325 days                    Physical Exam  Vitals reviewed.    Constitutional:       General: She is not in acute distress. Appearance: She is well-developed. She is not diaphoretic. HENT:      Head: Normocephalic and atraumatic. Right Ear: External ear normal.      Left Ear: External ear normal.      Nose: Nose normal.   Eyes:      General: No scleral icterus. Right eye: No discharge. Left eye: No discharge. Conjunctiva/sclera: Conjunctivae normal.   Neurological:      Mental Status: She is alert and oriented to person, place, and time. Psychiatric:         Behavior: Behavior normal.         Thought Content: Thought content normal.         Judgment: Judgment normal.         Lab Results: I have personally reviewed pertinent lab results. Imaging: I have personally reviewed pertinent reports. EKG, Pathology, and Other Studies: I have personally reviewed pertinent reports. Assessment/PLAN:    45 y.o. female with a long standing h/o of obesity and inability to sustain any meaningful weight loss on her own despite several attempts. She is interested in the Laparoscopic sleeve gastrectomy. She knows many people that had the procedure done including her sister and coworkers close to her that have been very successful in the past.  I have discussed both options with her today. As a part of her pre op evaluation, she will be referred to a cardiologist for risk stratification and for a sleep evaluation and consult to rule out obstructive sleep apnea if deemed necessary based on her score. She needs an EGD to evaluate the anatomy of her GI tract prior to the operation. I have spent over 30 minutes with her face to face in the office today discussing her options and details of the surgery. We have seen an animation of the surgery on the computer that illustrates how the operation is done and how the anatomy will be altered with the procedure. Over 50% of this was coordinating care.     I have used the [facility] bariatric surgical risk/benefit calculator and we have discussed the results as part of the preop education. She was given the opportunity to ask questions and I have answered all of them. I have discussed and educated the patient with regards to the components of our multidisciplinary program and the importance of compliance and follow up in the post operative period. The patient was also instructed with regards to the importance of behavior modification, nutritional counseling, support meeting attendance and lifestyle changes that are important to ensure success. Although there is a great statistical chance of improvement or even resolution of most of her associated comorbidities, the results vary from patient to patient and they largely depend on her commitment and compliance. She is 5'3" and weighs about 320 pounds with a BMI of 55. I have encouraged her to lose ideally 20 to 30 pounds prior to the surgery.       Rosemarie Joseph MD  11/3/2023  11:30 AM

## 2023-11-07 ENCOUNTER — PREP FOR PROCEDURE (OUTPATIENT)
Dept: BARIATRICS | Facility: CLINIC | Age: 38
End: 2023-11-07

## 2023-11-07 DIAGNOSIS — E66.01 MORBID (SEVERE) OBESITY DUE TO EXCESS CALORIES (HCC): Primary | ICD-10-CM

## 2023-11-14 RX ORDER — SODIUM CHLORIDE 9 MG/ML
125 INJECTION, SOLUTION INTRAVENOUS CONTINUOUS
Status: CANCELLED | OUTPATIENT
Start: 2023-11-14

## 2023-11-15 ENCOUNTER — ANESTHESIA EVENT (OUTPATIENT)
Dept: GASTROENTEROLOGY | Facility: HOSPITAL | Age: 38
End: 2023-11-15

## 2023-11-15 ENCOUNTER — HOSPITAL ENCOUNTER (OUTPATIENT)
Dept: GASTROENTEROLOGY | Facility: HOSPITAL | Age: 38
Setting detail: OUTPATIENT SURGERY
Discharge: HOME | End: 2023-11-15
Attending: SURGERY
Payer: COMMERCIAL

## 2023-11-15 ENCOUNTER — ANESTHESIA (OUTPATIENT)
Dept: GASTROENTEROLOGY | Facility: HOSPITAL | Age: 38
End: 2023-11-15

## 2023-11-15 VITALS
DIASTOLIC BLOOD PRESSURE: 90 MMHG | HEIGHT: 64 IN | RESPIRATION RATE: 20 BRPM | WEIGHT: 293 LBS | TEMPERATURE: 98.1 F | HEART RATE: 64 BPM | OXYGEN SATURATION: 97 % | SYSTOLIC BLOOD PRESSURE: 128 MMHG | BODY MASS INDEX: 50.02 KG/M2

## 2023-11-15 DIAGNOSIS — E66.01 MORBID (SEVERE) OBESITY DUE TO EXCESS CALORIES (HCC): ICD-10-CM

## 2023-11-15 LAB
EXT PREGNANCY TEST URINE: NEGATIVE
EXT. CONTROL: NORMAL

## 2023-11-15 PROCEDURE — 43239 EGD BIOPSY SINGLE/MULTIPLE: CPT | Performed by: SURGERY

## 2023-11-15 PROCEDURE — 81025 URINE PREGNANCY TEST: CPT | Performed by: ANESTHESIOLOGY

## 2023-11-15 PROCEDURE — 88305 TISSUE EXAM BY PATHOLOGIST: CPT | Performed by: STUDENT IN AN ORGANIZED HEALTH CARE EDUCATION/TRAINING PROGRAM

## 2023-11-15 RX ORDER — LIDOCAINE HYDROCHLORIDE 20 MG/ML
INJECTION, SOLUTION EPIDURAL; INFILTRATION; INTRACAUDAL; PERINEURAL AS NEEDED
Status: DISCONTINUED | OUTPATIENT
Start: 2023-11-15 | End: 2023-11-15

## 2023-11-15 RX ORDER — SODIUM CHLORIDE 9 MG/ML
125 INJECTION, SOLUTION INTRAVENOUS CONTINUOUS
Status: DISCONTINUED | OUTPATIENT
Start: 2023-11-15 | End: 2023-11-19 | Stop reason: HOSPADM

## 2023-11-15 RX ORDER — PROPOFOL 10 MG/ML
INJECTION, EMULSION INTRAVENOUS AS NEEDED
Status: DISCONTINUED | OUTPATIENT
Start: 2023-11-15 | End: 2023-11-15

## 2023-11-15 RX ADMIN — PROPOFOL 200 MG: 10 INJECTION, EMULSION INTRAVENOUS at 11:16

## 2023-11-15 RX ADMIN — PROPOFOL 50 MG: 10 INJECTION, EMULSION INTRAVENOUS at 11:17

## 2023-11-15 RX ADMIN — SODIUM CHLORIDE 125 ML/HR: 0.9 INJECTION, SOLUTION INTRAVENOUS at 10:57

## 2023-11-15 RX ADMIN — LIDOCAINE HYDROCHLORIDE 50 MG: 20 INJECTION, SOLUTION EPIDURAL; INFILTRATION; INTRACAUDAL at 11:16

## 2023-11-15 RX ADMIN — PROPOFOL 50 MG: 10 INJECTION, EMULSION INTRAVENOUS at 11:21

## 2023-11-15 RX ADMIN — PROPOFOL 50 MG: 10 INJECTION, EMULSION INTRAVENOUS at 11:19

## 2023-11-15 NOTE — ANESTHESIA PREPROCEDURE EVALUATION
Procedure:  EGD    Relevant Problems   ANESTHESIA (within normal limits)      CARDIO   (+) Intractable chronic migraine without aura and without status migrainosus      HEMATOLOGY   (+) Iron deficiency anemia      NEURO/PSYCH   (+) Anxiety   (+) Chronic intractable headache   (+) Depression   (+) Intractable chronic migraine without aura and without status migrainosus      PULMONARY   (+) Acute asthma exacerbation   (+) Moderate persistent asthma without complication   (+) SOB (shortness of breath)      Nervous and Auditory   (+) IIH (idiopathic intracranial hypertension)      Other   (+) Class 3 severe obesity with serious comorbidity and body mass index (BMI) of 50.0 to 59.9 in adult (HCC)   (+) Prediabetes        Physical Exam    Airway    Mallampati score: II  TM Distance: >3 FB  Neck ROM: full     Dental   No notable dental hx     Cardiovascular  Rhythm: regular, Rate: normal, Cardiovascular exam normal    Pulmonary  Pulmonary exam normal Breath sounds clear to auscultation    Other Findings        Anesthesia Plan  ASA Score- 2     Anesthesia Type- general with ASA Monitors. Additional Monitors:     Airway Plan:            Plan Factors-Exercise tolerance (METS): >4 METS. Chart reviewed. Patient summary reviewed. Patient is not a current smoker. Induction- intravenous. Postoperative Plan-     Informed Consent- Anesthetic plan and risks discussed with patient.

## 2023-11-15 NOTE — INTERVAL H&P NOTE
H&P reviewed. After examining the patient I find no changes in the patients condition since the H&P had been written.     Vitals:    11/15/23 1054   BP: (!) 169/103   Pulse: 77   Resp: 16   Temp: 98.1 °F (36.7 °C)   SpO2: 97%

## 2023-11-15 NOTE — ANESTHESIA POSTPROCEDURE EVALUATION
Post-Op Assessment Note    CV Status:  Stable    Pain management: adequate       Mental Status:  Alert and awake   Hydration Status:  Euvolemic   PONV Controlled:  Controlled   Airway Patency:  Patent  Two or more mitigation strategies used for obstructive sleep apnea   Post Op Vitals Reviewed: Yes    No anethesia notable event occurred.     Staff: Anesthesiologist               BP      Temp      Pulse     Resp      SpO2      /81   Pulse 77   Temp 98.1 °F (36.7 °C) (Temporal)   Resp 20   Ht 5' 3.5" (1.613 m)   Wt (!) 143 kg (315 lb)   LMP 11/06/2023 (Approximate)   SpO2 96%   BMI 54.92 kg/m²

## 2023-11-21 PROCEDURE — 88305 TISSUE EXAM BY PATHOLOGIST: CPT | Performed by: STUDENT IN AN ORGANIZED HEALTH CARE EDUCATION/TRAINING PROGRAM

## 2023-11-28 NOTE — PROGRESS NOTES
Behavioral health         Name: Tyrell Gabriel            2/6 weight check   Starting weight 317.5  Last time weight 315.5  Today's weight 311.4  BMI:54.30    Surgery month: May-June   Surgery deadline: Sept  Surgeon:Dr. Ave Duverney   Type of Surgery: Sleeve    Mental health and wellness -   Patient presents to the office today for a weight check and support visit. The pt reported that her spouse is not supportive because he does not want her to have the surgery but was open to going with her to the [facility] with EGD and met Dr. Ave Duverney which help him feel more comfortable. Mother is very supportive by reminding her about eating healthy. Sleeping well. Work is stressful but is manageable. Eating behaviors - two full meals a day and some snacks. Hydration-water 16 oz four bottles, cappuccino and tea. Packs lunch salads and sandwich. Portion control. When she eats, she starts with her protein. Encouraged reading bariatric book. The pt has been substituting unhealthy snacks with healthy ones to decrease grazing. Practicing a shake for breakfast. Logging food by using a note pad. Taking vitamins     Activity - increased steps into the 4,700    Progress toward program requirements    Workflow:  Psych and/or D+A Clearance: not needed  PCP Letter:9/13/23  Surgeon Appt.: 11/3/23  EGD : 11/15/23  Cardiac Risk Assessment: pending  Sleep Studies:STOP BANG 2-8  Bloodwork: pending  Nicotine test: non-smoker  Support Group: recommended     Reviewed and discussed  Adequate hydration  Exercise  Meal planning and preparation  Lifestyle changes  Possible problems with poor eating habits  Practice mindful eating. Setting aside time to eat slowly, and savor food    Goal: 1-Increase activity level 2- increase water intake.    Next appointment: 1/5/23 RD

## 2023-12-01 ENCOUNTER — OFFICE VISIT (OUTPATIENT)
Dept: BARIATRICS | Facility: CLINIC | Age: 38
End: 2023-12-01

## 2023-12-01 VITALS — WEIGHT: 293 LBS | BODY MASS INDEX: 54.3 KG/M2

## 2023-12-01 DIAGNOSIS — E66.01 MORBID (SEVERE) OBESITY DUE TO EXCESS CALORIES (HCC): Primary | ICD-10-CM

## 2023-12-01 PROCEDURE — RECHECK

## 2024-01-02 ENCOUNTER — LAB (OUTPATIENT)
Dept: LAB | Facility: CLINIC | Age: 39
End: 2024-01-02
Payer: COMMERCIAL

## 2024-01-02 DIAGNOSIS — E66.01 MORBID OBESITY DUE TO EXCESS CALORIES (HCC): ICD-10-CM

## 2024-01-02 DIAGNOSIS — D50.8 OTHER IRON DEFICIENCY ANEMIA: ICD-10-CM

## 2024-01-02 LAB
BASOPHILS # BLD AUTO: 0.05 THOUSANDS/ÂΜL (ref 0–0.1)
BASOPHILS NFR BLD AUTO: 1 % (ref 0–1)
EOSINOPHIL # BLD AUTO: 0.49 THOUSAND/ÂΜL (ref 0–0.61)
EOSINOPHIL NFR BLD AUTO: 6 % (ref 0–6)
ERYTHROCYTE [DISTWIDTH] IN BLOOD BY AUTOMATED COUNT: 17.3 % (ref 11.6–15.1)
EST. AVERAGE GLUCOSE BLD GHB EST-MCNC: 140 MG/DL
FERRITIN SERPL-MCNC: 11 NG/ML (ref 11–307)
HBA1C MFR BLD: 6.5 %
HCT VFR BLD AUTO: 39.4 % (ref 34.8–46.1)
HGB BLD-MCNC: 11.6 G/DL (ref 11.5–15.4)
IMM GRANULOCYTES # BLD AUTO: 0.03 THOUSAND/UL (ref 0–0.2)
IMM GRANULOCYTES NFR BLD AUTO: 0 % (ref 0–2)
LYMPHOCYTES # BLD AUTO: 2.47 THOUSANDS/ÂΜL (ref 0.6–4.47)
LYMPHOCYTES NFR BLD AUTO: 29 % (ref 14–44)
MCH RBC QN AUTO: 24 PG (ref 26.8–34.3)
MCHC RBC AUTO-ENTMCNC: 29.4 G/DL (ref 31.4–37.4)
MCV RBC AUTO: 81 FL (ref 82–98)
MONOCYTES # BLD AUTO: 0.63 THOUSAND/ÂΜL (ref 0.17–1.22)
MONOCYTES NFR BLD AUTO: 7 % (ref 4–12)
NEUTROPHILS # BLD AUTO: 5.01 THOUSANDS/ÂΜL (ref 1.85–7.62)
NEUTS SEG NFR BLD AUTO: 57 % (ref 43–75)
NRBC BLD AUTO-RTO: 0 /100 WBCS
PLATELET # BLD AUTO: 366 THOUSANDS/UL (ref 149–390)
PMV BLD AUTO: 10.3 FL (ref 8.9–12.7)
RBC # BLD AUTO: 4.84 MILLION/UL (ref 3.81–5.12)
TSH SERPL DL<=0.05 MIU/L-ACNC: 2.26 UIU/ML (ref 0.45–4.5)
WBC # BLD AUTO: 8.68 THOUSAND/UL (ref 4.31–10.16)

## 2024-01-02 PROCEDURE — 85025 COMPLETE CBC W/AUTO DIFF WBC: CPT

## 2024-01-02 PROCEDURE — 36415 COLL VENOUS BLD VENIPUNCTURE: CPT

## 2024-01-02 PROCEDURE — 80053 COMPREHEN METABOLIC PANEL: CPT

## 2024-01-02 PROCEDURE — 83540 ASSAY OF IRON: CPT

## 2024-01-02 PROCEDURE — 83550 IRON BINDING TEST: CPT

## 2024-01-02 PROCEDURE — 82728 ASSAY OF FERRITIN: CPT

## 2024-01-02 PROCEDURE — 84443 ASSAY THYROID STIM HORMONE: CPT

## 2024-01-02 PROCEDURE — 83036 HEMOGLOBIN GLYCOSYLATED A1C: CPT

## 2024-01-02 NOTE — PROGRESS NOTES
Bariatric Nutrition Assessment Note    Type of surgery    Preop  Surgery Date: Pt has 6 month program requirement   Today is 3/6 of program requirement     Surgeon: Dr. Renzo Zhao    Nutrition Assessment   Cnidy Bolden  38 y.o.  female     Wt with BMI of 25: 142.8#  Pre-Op Excess Wt: 174.7#  Wt (!) 142 kg (313 lb 6.4 oz)   BMI 54.65 kg/m²      Pt maintained her weight within 2# over past month   - 4.2# x 3 months      NAFLD Fibrosis Score is: -.15    NAFLD Score Correlated Fibrosis Severity   <-1.455 F0-F2   -1.455-0.676 Indeterminate Score   >0.676 F3-F4   **Fibrosis Severity Scale: F0 = no fibrosis; F1= mild fibrosis; F2 = moderate fibrosis; F3 = severe fibrosis; F4 = cirrhosis    NAFLD Score Component Values:  Component Value Date   Age: 38 y.o.     BMI: 55.2 kg/m²    IFG or DM: Yes    AST: 14 U/L 2024   ALT: 13 U/L 2024   Platelet: 366 Thousands/uL 2024   Albumin: 3.8 g/dL 2024        [place] Equation:  2263 calories per day   Estimated calories for weight loss 3637-5295 calories per day  ( 1-2# per wk wt loss - sedentary )  Estimated protein needs 65-97 g/d (1.0-1.5 gms/kg IBW )   Estimated fluid needs 64.9-76 ounces per day (30-35 ml/kg IBW )      Weight History   Onset of Obesity: Adult  Family history of obesity: No  Wt Loss Attempts: Counseling with  MD, prescribed medication   Portion control and increased activity, limiting her carbohydrate intake   Patient has tried the above for 6 months or more with insufficient weight loss or weight regain, which is why patient has requested to be evaluated for weight loss surgery today  Maximum Wt Lost: size 26 to size 18 over past one and a half years       Review of History and Medications   Past Medical History:   Diagnosis Date    Asthma      Past Surgical History:   Procedure Laterality Date     SECTION      FL LUMBAR PUNCTURE DIAGNOSTIC  2022     Social History     Socioeconomic History    Marital status:  /Civil Union     Spouse name: Not on file    Number of children: Not on file    Years of education: Not on file    Highest education level: Not on file   Occupational History    Not on file   Tobacco Use    Smoking status: Never    Smokeless tobacco: Never   Vaping Use    Vaping status: Never Used   Substance and Sexual Activity    Alcohol use: No    Drug use: No    Sexual activity: Yes   Other Topics Concern    Not on file   Social History Narrative    Social problem      Social Determinants of Health     Financial Resource Strain: Low Risk  (1/9/2023)    Overall Financial Resource Strain (CARDIA)     Difficulty of Paying Living Expenses: Not hard at all   Food Insecurity: Food Insecurity Present (1/9/2023)    Hunger Vital Sign     Worried About Running Out of Food in the Last Year: Sometimes true     Ran Out of Food in the Last Year: Sometimes true   Transportation Needs: No Transportation Needs (1/9/2023)    PRAPARE - Transportation     Lack of Transportation (Medical): No     Lack of Transportation (Non-Medical): No   Physical Activity: Inactive (9/29/2021)    Exercise Vital Sign     Days of Exercise per Week: 0 days     Minutes of Exercise per Session: 0 min   Stress: Stress Concern Present (9/29/2021)    [place] [employer] - Occupational Stress Questionnaire     Feeling of Stress : To some extent   Social Connections: Moderately Isolated (9/29/2021)    Social Connection and Isolation Panel [NHANES]     Frequency of Communication with Friends and Family: More than three times a week     Frequency of Social Gatherings with Friends and Family: More than three times a week     Attends Scientologist Services: Never     Active Member of Clubs or Organizations: No     Attends Club or Organization Meetings: Never     Marital Status:    Intimate Partner Violence: Not At Risk (9/29/2021)    Humiliation, Afraid, Rape, and Kick questionnaire     Fear of Current or Ex-Partner: No      Emotionally Abused: No     Physically Abused: No     Sexually Abused: No   Housing Stability: Low Risk  (1/9/2023)    Housing Stability Vital Sign     Unable to Pay for Housing in the Last Year: No     Number of Places Lived in the Last Year: 1     Unstable Housing in the Last Year: No       Current Outpatient Medications:     acetaZOLAMIDE (DIAMOX) 250 mg tablet, TAKE 2 TABLETS BY MOUTH EVERY MORNING, Disp: 60 tablet, Rfl: 5    Advair HFA 45-21 MCG/ACT inhaler, INHALE 2 PUFFS BY MOUTH 2 TIMES A DAY RINSE MOUTH AFTER USE., Disp: 12 g, Rfl: 3    albuterol (2.5 mg/3 mL) 0.083 % nebulizer solution, Take 3 mL (2.5 mg total) by nebulization every 6 (six) hours as needed for wheezing or shortness of breath, Disp: 75 mL, Rfl: 1    albuterol (PROVENTIL HFA,VENTOLIN HFA) 90 mcg/act inhaler, INHALE 1 PUFF EVERY 4 HOURS AS NEEDED FOR WHEEZING OR SHORTNESS OF BREATH., Disp: 18 g, Rfl: 3    ferrous sulfate 324 (65 Fe) mg, Take 1 tablet (324 mg total) by mouth every other day (Patient not taking: Reported on 11/3/2023), Disp: 30 tablet, Rfl: 3    fluticasone-salmeterol (Advair) 500-50 mcg/dose inhaler, Inhale 1 puff 2 (two) times a day, Disp: , Rfl:     gabapentin (NEURONTIN) 100 mg capsule, Take 1 capsule (100 mg total) by mouth 3 (three) times a day, Disp: 30 capsule, Rfl: 2    indomethacin (INDOCIN) 25 mg capsule, TAKE 1 TO 2 CAPSULES BY MOUTH EVERY 8 HOURS AS NEEDED FOR SEVERE HEADACHE WITH FOOD (Patient not taking: Reported on 11/3/2023), Disp: 15 capsule, Rfl: 1    pantoprazole (PROTONIX) 40 mg tablet, TAKE 1 TABLET BY MOUTH EVERY DAY (Patient not taking: Reported on 11/3/2023), Disp: 90 tablet, Rfl: 3    topiramate (TOPAMAX) 50 MG tablet, TAKE 1 AND 1/2 TABLETS (75MG) AT BEDTIME FOR 1 WEEK THEN TAKE 2 TABLETS (100MG) AT BEDTIME, Disp: 60 tablet, Rfl: 5     She is taking iron 3 days per week due to inability to tolerate daily iron     Food Intake and Lifestyle Assessment   Food Intake Assessment completed via usual diet  "recall  Breakfast:  9/ 9:30 am Coffee or sometimes a nutrigrain bar or yogurt with granola   Cold coffee with cream and sugar from [employer] (20-24 ounces) - sips all day   Snack: 0   Lunch: 1 pm - packs and take out - trying to pack lunch more   Take out once per week - can be a variety of things with her co workers   Packs lunch 4 x per week   Will pack fruit , granola and yogurt , sandwich ( turkey and cheese ) bottle of water or juice and banana or strawberries   Snack: 0  Dinner: 6/6:30 pm   Rice/ chicken / seldom does pork/ always includes a vegetable ( steamer bags ) or salad   Snack: seldom   Beverage intake: water, juice and coffee/tea  Protein supplement: none   Estimated protein intake per day: ~50 grams per day   Estimated fluid intake per day: \" not enough\"  Drinks a little of water or juice or coffee  Seldomly finishes a bottle.  Likes crystal lite also   Meals eaten away from home: once to twice per week - once for lunch and once for dinner   Typical meal pattern: 2-3 meals per day and 0-1 snacks per day  Eating Behaviors: Mindless eating  Food allergies or intolerances: No Known Allergies   NKFA  Cultural or Confucianist considerations:      Lab Results   Component Value Date    IRON 46 (L) 01/02/2024    TIBC 427 01/02/2024    FERRITIN 11 01/02/2024     Lab Results   Component Value Date    WBC 8.68 01/02/2024    HGB 11.6 01/02/2024    HCT 39.4 01/02/2024    MCV 81 (L) 01/02/2024     01/02/2024     Lab Results   Component Value Date    HGBA1C 6.5 (H) 01/02/2024      Lipid pending    Pt can only tolerate oral iron three days per week.  With anemia and low iron stores, recommended consult with hematology for IV iron to prevent further decrease in H/H post op .  During initial post op period, intake of iron containing foods is low and malabsorption or iron is affected.   Pt with prediabetes, PCP holding off on medication as patient is pursuing weight loss surgery and  making dietary changes in " "preparation.     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   Does all housework   Tracks her steps and meets her goal most day   Will set to 6000 per day - meeting 4500 per day   Current physical limitations: SOB- asthma, need inhaler, extreme headaches with pseudo tumor     Psychosocial Assessment   Support systems: mother   Socioeconomic factors: works 35 to 40 hours per week   Works 9 to 5 or 10 to 5 - 6 day work week   Sleep: 12 midnight and up at 6 am - sometimes on weekend will sleep to 7:30 am      Nutrition Diagnosis- continued   Diagnosis: Overweight / Obesity (NC-3.3)  Related to: Food and nutrition-related knowledge deficit, Physical inactivity and Excessive energy intake  As Evidenced by: BMI >25 and Unintentional weight gain     Nutrition Prescription: Recommend the following diet  2000 calories/ 100 grams of carb     Interventions and Teaching   Discussed pre-op and post-op nutrition guidelines.       Patient educated and handouts provided.  Surgical changes to stomach / GI  Capacity of post-surgery stomach  Diet progression  Adequate hydration  Sugar and fat restriction to decrease \"dumping syndrome\"  Fat restriction to decrease steatorrhea  Expected weight loss  Weight loss plateaus/ possibility of weight regain  Exercise  Suggestions for pre-op diet  Nutrition considerations after surgery  Protein supplements  Meal planning and preparation  Appropriate carbohydrate, protein, and fat intake, and food/fluid choices to maximize safe weight loss, nutrient intake, and tolerance   Dietary and lifestyle changes  Possible problems with poor eating habits  Intuitive eating  Techniques for self monitoring and keeping daily food journal  Potential for food intolerance after surgery, and ways to deal with them including: lactose intolerance, nausea, reflux, vomiting, diarrhea, food intolerance, appetite changes, gas  Vitamin / Mineral " supplementation of Multivitamin with minerals, Calcium, Vitamin B12, Iron, Fat Soluble vitamins and Vitamin D  Discussed results of blood work, patient agreeable to hematology consult to build up iron stores prior to surgery.  Pt is only able to tolerate her iron supplement three days per week, otherwise experiences severe constipation     Patient is not currently pregnant and doesn't desire to become pregnant a minimum of one year post-op    Education provided to: patient    Barriers to learning: No barriers identified    Readiness to change: preparation    Prior research on procedure: discussed with provider and pre-op class    Comprehension: needs reinforcement and verbalizes understanding     Expected Compliance: good  Recommendations  Pt is an appropriate candidate for surgery. Yes    Workflow: (Incomplete in Bold):  Psych and/or D+A Clearance: n/a  Blood Work: done, lipids pending   PCP letter: done  Surgeon Appt: done  EGD: done  Cardiac Risk Assessment with ECG: referral placed  Neurology consult : 2/23/2024   Hematology - referral placed   Sleep Studies: N/A  Nicotine test: n/a  Pre-Operative Program: 3/6  NAFLD Score Calculated: yes   Hepatology consult: message sent to surgeon to order testing       Evaluation / Monitoring  Dietitian to Monitor: Eating pattern as discussed Tolerance of nutrition prescription Body weight Lab values Physical activity Bowel pattern  Pt has been drinking a shake for breakfast 3 days per week and eating 2 meals. She is taking her vitamins and minerals as recommended. Pt reports that she can only tolerate one iron supplement three days per week otherwise she becomes constipated.  Agreeable to consult with hematology to replete iron . Pt has been drinking 64 ounces or more of fluid per day. Pt has been doing  exercise videos on line from a previous bariatric patient that she enjoys. She is doing the exercise videos three days per week for 30 minutes She is tracking her steps to  4500 per day and focused on increasing her daily steps . She was food logging but got out of the habit over the holidays and plans to start logging again.  She found logging helpful in making lower calorie food choices.  Overall making lifestyle changes in preparation for surgery     Goals  Food journal, Complete lession plans 1-6, Eat 3 meals per day and Eliminate mindless snacking   Food log 2000 calories or less per day   Hematology consult   Practice not drinking  before and during meals.     Time Spent:   30 Minutes

## 2024-01-03 ENCOUNTER — OFFICE VISIT (OUTPATIENT)
Dept: INTERNAL MEDICINE CLINIC | Facility: CLINIC | Age: 39
End: 2024-01-03

## 2024-01-03 ENCOUNTER — TELEPHONE (OUTPATIENT)
Dept: NEUROLOGY | Facility: CLINIC | Age: 39
End: 2024-01-03

## 2024-01-03 VITALS
TEMPERATURE: 97.6 F | BODY MASS INDEX: 55.2 KG/M2 | HEART RATE: 73 BPM | OXYGEN SATURATION: 99 % | SYSTOLIC BLOOD PRESSURE: 126 MMHG | DIASTOLIC BLOOD PRESSURE: 78 MMHG | WEIGHT: 293 LBS

## 2024-01-03 DIAGNOSIS — E11.9 TYPE 2 DIABETES MELLITUS WITHOUT COMPLICATION, WITHOUT LONG-TERM CURRENT USE OF INSULIN (HCC): ICD-10-CM

## 2024-01-03 DIAGNOSIS — G93.2 IDIOPATHIC INTRACRANIAL HYPERTENSION: ICD-10-CM

## 2024-01-03 DIAGNOSIS — J45.40 MODERATE PERSISTENT ASTHMA WITHOUT COMPLICATION: Primary | ICD-10-CM

## 2024-01-03 DIAGNOSIS — Z82.49 FAMILY HISTORY OF BRAIN ANEURYSM: ICD-10-CM

## 2024-01-03 DIAGNOSIS — G56.03 BILATERAL CARPAL TUNNEL SYNDROME: ICD-10-CM

## 2024-01-03 LAB
ALBUMIN SERPL BCP-MCNC: 3.8 G/DL (ref 3.5–5)
ALP SERPL-CCNC: 90 U/L (ref 34–104)
ALT SERPL W P-5'-P-CCNC: 13 U/L (ref 7–52)
ANION GAP SERPL CALCULATED.3IONS-SCNC: 8 MMOL/L
AST SERPL W P-5'-P-CCNC: 14 U/L (ref 13–39)
BILIRUB SERPL-MCNC: 0.23 MG/DL (ref 0.2–1)
BUN SERPL-MCNC: 10 MG/DL (ref 5–25)
CALCIUM SERPL-MCNC: 8.9 MG/DL (ref 8.4–10.2)
CHLORIDE SERPL-SCNC: 104 MMOL/L (ref 96–108)
CO2 SERPL-SCNC: 28 MMOL/L (ref 21–32)
CREAT SERPL-MCNC: 0.7 MG/DL (ref 0.6–1.3)
GFR SERPL CREATININE-BSD FRML MDRD: 110 ML/MIN/1.73SQ M
GLUCOSE SERPL-MCNC: 88 MG/DL (ref 65–140)
IRON SATN MFR SERPL: 11 % (ref 15–50)
IRON SERPL-MCNC: 46 UG/DL (ref 50–212)
POTASSIUM SERPL-SCNC: 3.7 MMOL/L (ref 3.5–5.3)
PROT SERPL-MCNC: 7.5 G/DL (ref 6.4–8.4)
SODIUM SERPL-SCNC: 140 MMOL/L (ref 135–147)
TIBC SERPL-MCNC: 427 UG/DL (ref 250–450)
UIBC SERPL-MCNC: 381 UG/DL (ref 155–355)

## 2024-01-03 PROCEDURE — 99214 OFFICE O/P EST MOD 30 MIN: CPT | Performed by: INTERNAL MEDICINE

## 2024-01-03 PROCEDURE — 3078F DIAST BP <80 MM HG: CPT | Performed by: INTERNAL MEDICINE

## 2024-01-03 PROCEDURE — 3074F SYST BP LT 130 MM HG: CPT | Performed by: INTERNAL MEDICINE

## 2024-01-03 RX ORDER — BUDESONIDE AND FORMOTEROL FUMARATE DIHYDRATE 160; 4.5 UG/1; UG/1
2 AEROSOL RESPIRATORY (INHALATION) 2 TIMES DAILY
Qty: 10.2 G | Refills: 3 | Status: SHIPPED | OUTPATIENT
Start: 2024-01-03

## 2024-01-03 NOTE — PROGRESS NOTES
Assessment/Plan:    Type 2 diabetes mellitus without complication, without long-term current use of insulin (HCC)    Lab Results   Component Value Date    HGBA1C 6.5 (H) 01/02/2024     Recent A1C now places pt in diabetic range. Plans to undergo weight loss surgery in March 2024.    Continue current weight loss  Recheck A1c in 3 months    Moderate persistent asthma without complication  Continues to use rescue albuterol inhaler 1-2 times daily in addition to advair. Not complaining of frequent nighttime awakenings. Lives in an older home / no animals at home. As pt continues to require daily use of albuterol inhaler, will attempt to switch from Advair / albuterol to Symbicort regimen. This medication will require a prior auth, if still not covered, pt will need to be transitioned back to advair / albuterol regimen.    Symbicort BID with prn additional dose qd  Stop advair / albuterol  [facility] allergy panel     Idiopathic intracranial hypertension  MRI 3/2021--prominent CSFwithin optic nerve sheaths identified with the prominent partially of the sell turcica - findings concerning for IIH. LP later performed on 4/19/2022 which confirmed the diagnosis given an opening pressure of 26 cm and closing pressure of 12 cm. Last seen by Neurology via Telemedicine 7/8/22 who recommended repeat brain MRI with and without contrast to re-evaluate findingsand consider CTA head d/t family hx of aneurysms. However, insurance prevented her from undergoing these tests. Also recommended follow-up with Ophthalmology. Although referred, patient has not undergone yet.       Patient has been on topamax / diamox ~1 year but stopped both over past month for concerns that they were causing stomach pain. Continues to have migraines. No evidence of papilledema on exam today.    F/u neuro 2/23/2024  Will need MRI  Will need re-referral to ophthalmology when order expires and highly encourage patient to get appointment.   Continue to monitor for  papilledema during clinic visits    Bilateral carpal tunnel syndrome  Bilateral arm numbness/pain. Negative spurlings test. Neg Tinels. Positive Phalens test. 2/2 carpal tunnel.     Wrist splint   Weight loss as above  Defer further testing / surgical eval for now    Class 3 severe obesity with serious comorbidity and body mass index (BMI) of 50.0 to 59.9 in adult ([facility])  Planned Bariatric Sleeve Surgery in March. Pt currently undergoing steps for evaluation. Patient has goal to lose 25lbs prior to surgery. Although lost initial 5lbs has regained since starting program over holidays.     Continue to encourage WL goals / exercise and diet intervension.  Following with outpt bariatrics      Diagnoses and all orders for this visit:    Moderate persistent asthma without complication  -     [facility] Allergy Panel, Adult; Future  -     budesonide-formoterol (Symbicort) 160-4.5 mcg/act inhaler; Inhale 2 puffs 2 (two) times a day Rinse mouth after use. Use additional puff if needed daily    Bilateral carpal tunnel syndrome  -     Cock Up Wrist Splint    Type 2 diabetes mellitus without complication, without long-term current use of insulin ([facility])    Idiopathic intracranial hypertension    Family history of brain aneurysm          Patient Instructions   Symbicort twice a day with additional puff as needed  Please restart your diamox.    Return in about 6 weeks (around 2/14/2024) for Recheck.    Subjective:      Patient ID: Cindy Bolden is a 38 y.o. female with a pmhx of mild intermittent asthma, acute asthma exacerbation, migraine without aura, fhx brain aneurysm, idiopathic intracranial htn, obesity, sob, diabetes who is here for follow-up of asthma.    Chief Complaint   Patient presents with    Follow-up       Patient states she started the bariatric program outpatient and plans to get the sleeve for the tentative surgery date in March 2024.  Has a goal to lose 25 pounds prior to the surgery.  Has initially lost  approximately 5 pounds but has regained over the holidays.  States she has also been having stomach pain which she attributed to her Topamax and Diamox.  Patient stopped both medications over the past 1 month.  Continues to have migraines.  Also complains of bilateral arm pain/numbness.  States concern over some of the blood work she obtained and requested that it be reviewed with her in the room.  States that she continues to use her albuterol rescue inhaler 1-2 times a day.  But feels that she is overall improved since her last appointment.  Continues to use her Advair daily.  States she is aware that she needs to have an MRI and CTA but states that insurance issues prevented her from obtaining most recently.  Scheduled neurology appointment for February.        The following portions of the patient's history were reviewed and updated as appropriate: allergies, current medications, past family history, past medical history, past social history, past surgical history and problem list.    ROS as indicated in HPI otherwise negative        Current Outpatient Medications   Medication Sig Dispense Refill    budesonide-formoterol (Symbicort) 160-4.5 mcg/act inhaler Inhale 2 puffs 2 (two) times a day Rinse mouth after use. Use additional puff if needed daily 10.2 g 3    acetaZOLAMIDE (DIAMOX) 250 mg tablet TAKE 2 TABLETS BY MOUTH EVERY MORNING 60 tablet 5    ferrous sulfate 324 (65 Fe) mg Take 1 tablet (324 mg total) by mouth every other day (Patient not taking: Reported on 11/3/2023) 30 tablet 3    gabapentin (NEURONTIN) 100 mg capsule Take 1 capsule (100 mg total) by mouth 3 (three) times a day 30 capsule 2    indomethacin (INDOCIN) 25 mg capsule TAKE 1 TO 2 CAPSULES BY MOUTH EVERY 8 HOURS AS NEEDED FOR SEVERE HEADACHE WITH FOOD (Patient not taking: Reported on 11/3/2023) 15 capsule 1    pantoprazole (PROTONIX) 40 mg tablet TAKE 1 TABLET BY MOUTH EVERY DAY (Patient not taking: Reported on 11/3/2023) 90 tablet 3     topiramate (TOPAMAX) 50 MG tablet TAKE 1 AND 1/2 TABLETS (75MG) AT BEDTIME FOR 1 WEEK THEN TAKE 2 TABLETS (100MG) AT BEDTIME 60 tablet 5     No current facility-administered medications for this visit.       Objective:    /78 (BP Location: Left arm, Patient Position: Sitting, Cuff Size: Large)   Pulse 73   Temp 97.6 °F (36.4 °C) (Temporal)   Wt (!) 144 kg (316 lb 9.6 oz)   SpO2 99%   BMI 55.20 kg/m²        Physical Exam  Constitutional:       General: She is not in acute distress.     Appearance: She is obese. She is not ill-appearing or toxic-appearing.   HENT:      Mouth/Throat:      Mouth: Mucous membranes are moist.      Pharynx: Oropharynx is clear.   Eyes:      General: Lids are normal. Vision grossly intact. No allergic shiner.     Comments: Good pulsation of retinal arteries. No papilledema seen,    Cardiovascular:      Rate and Rhythm: Normal rate and regular rhythm.      Heart sounds: No murmur heard.  Pulmonary:      Effort: Pulmonary effort is normal.      Breath sounds: Normal breath sounds. No wheezing, rhonchi or rales.   Abdominal:      General: Abdomen is flat. There is no distension.      Tenderness: There is no abdominal tenderness.   Skin:     General: Skin is warm and dry.   Neurological:      Mental Status: She is alert and oriented to person, place, and time. Mental status is at baseline.   Psychiatric:         Mood and Affect: Mood normal.         Behavior: Behavior normal.         Thought Content: Thought content normal.         Judgment: Judgment normal.                Galina Wu DO  01/04/24  [facility], PGY-1

## 2024-01-03 NOTE — TELEPHONE ENCOUNTER
Pt called to make f/u appt with Santos in CV. LOV 7/2022.    Appt made for 2/23/24 @ 9:00 in CV with MK.   Pt is asking for assistance with [employer] for transportation.   Please assist.

## 2024-01-04 PROBLEM — E11.9 TYPE 2 DIABETES MELLITUS WITHOUT COMPLICATION, WITHOUT LONG-TERM CURRENT USE OF INSULIN (HCC): Status: ACTIVE | Noted: 2018-03-27

## 2024-01-04 NOTE — ASSESSMENT & PLAN NOTE
Continues to use rescue albuterol inhaler 1-2 times daily in addition to advair. Not complaining of frequent nighttime awakenings. Lives in an older home / no animals at home. As pt continues to require daily use of albuterol inhaler, will attempt to switch from Advair / albuterol to Symbicort regimen. This medication will require a prior auth, if still not covered, pt will need to be transitioned back to advair / albuterol regimen.    Symbicort BID with prn additional dose qd  Stop advair / albuterol  [facility] allergy panel

## 2024-01-04 NOTE — ASSESSMENT & PLAN NOTE
Bilateral arm numbness/pain. Negative spurlings test. Neg Tinels. Positive Phalens test. 2/2 carpal tunnel.     Wrist splint   Weight loss as above  Defer further testing / surgical eval for now

## 2024-01-04 NOTE — ASSESSMENT & PLAN NOTE
Planned Bariatric Sleeve Surgery in March. Pt currently undergoing steps for evaluation. Patient has goal to lose 25lbs prior to surgery. Although lost initial 5lbs has regained since starting program over holidays.     Continue to encourage WL goals / exercise and diet intervension.  Following with outpt bariatrics

## 2024-01-04 NOTE — TELEPHONE ENCOUNTER
JASON called [employer] at [phone] and spoke with Delon.  Patient had courtesy 60 day pass which  in 2023.  Patient has not returned an application.    SW called patient and left detailed message requesting call back.  Lyft was arranged for patient once prior by JASON and patient was not there for , office also attempted to schedule Lyft for patient and patient was not there.  Lyft is used as a one time last resort.      JASON also informed in message patient since patient is eligible for [employer] services that patient will need to utilize this for transportation.  JASON mailed [employer] application this date.  SW remains available.

## 2024-01-04 NOTE — ASSESSMENT & PLAN NOTE
Lab Results   Component Value Date    HGBA1C 6.5 (H) 01/02/2024     Recent A1C now places pt in diabetic range. Plans to undergo weight loss surgery in March 2024.    Continue current weight loss  Recheck A1c in 3 months

## 2024-01-04 NOTE — ASSESSMENT & PLAN NOTE
MRI 3/2021--prominent CSFwithin optic nerve sheaths identified with the prominent partially of the sell turcica - findings concerning for IIH. LP later performed on 4/19/2022 which confirmed the diagnosis given an opening pressure of 26 cm and closing pressure of 12 cm. Last seen by Neurology via Telemedicine 7/8/22 who recommended repeat brain MRI with and without contrast to re-evaluate findingsand consider CTA head d/t family hx of aneurysms. However, insurance prevented her from undergoing these tests. Also recommended follow-up with Ophthalmology. Although referred, patient has not undergone yet.       Patient has been on topamax / diamox ~1 year but stopped both over past month for concerns that they were causing stomach pain. Continues to have migraines. No evidence of papilledema on exam today.    F/u neuro 2/23/2024  Will need MRI  Will need re-referral to ophthalmology when order expires and highly encourage patient to get appointment.   Continue to monitor for papilledema during clinic visits

## 2024-01-05 ENCOUNTER — OFFICE VISIT (OUTPATIENT)
Dept: BARIATRICS | Facility: CLINIC | Age: 39
End: 2024-01-05

## 2024-01-05 VITALS — BODY MASS INDEX: 54.65 KG/M2 | WEIGHT: 293 LBS

## 2024-01-05 DIAGNOSIS — Z82.49 FAMILY HISTORY OF BRAIN ANEURYSM: ICD-10-CM

## 2024-01-05 DIAGNOSIS — R79.0 LOW FERRITIN: ICD-10-CM

## 2024-01-05 DIAGNOSIS — F41.9 ANXIETY: ICD-10-CM

## 2024-01-05 DIAGNOSIS — D64.9 ANEMIA: ICD-10-CM

## 2024-01-05 DIAGNOSIS — E66.01 CLASS 3 SEVERE OBESITY WITH SERIOUS COMORBIDITY AND BODY MASS INDEX (BMI) OF 50.0 TO 59.9 IN ADULT (HCC): Primary | ICD-10-CM

## 2024-01-05 DIAGNOSIS — G93.2 IDIOPATHIC INTRACRANIAL HYPERTENSION: ICD-10-CM

## 2024-01-05 DIAGNOSIS — G43.719 INTRACTABLE CHRONIC MIGRAINE WITHOUT AURA AND WITHOUT STATUS MIGRAINOSUS: ICD-10-CM

## 2024-01-05 DIAGNOSIS — Z01.818 PRE-OPERATIVE CLEARANCE: ICD-10-CM

## 2024-01-05 DIAGNOSIS — R73.03 PREDIABETES: ICD-10-CM

## 2024-01-05 PROCEDURE — RECHECK: Performed by: DIETITIAN, REGISTERED

## 2024-01-05 NOTE — TELEPHONE ENCOUNTER
SW called patient at [phone].  Patient would like to complete application for [employer] services, does not feel she could take the bus due to her conditions.  Patient states she saw the application before but had difficulty completing it.  SW went over the application with the patient and completed a lot of it together.      Patient typically gets a ride from a coworker for work, and Lyft from other offices.  Does not have other means of transportation.  Patient requested application with insurance information be sent to her at her work- [email or web address]  Filiberto White talking with you today.  Hope your appointment went well this morning.  Please see attached application we worked together on over the phone as well as copies of your insurance card.  Review the information and complete any missing sections.  Please mail to the address on the front page as soon as possible so that I can get you set up for transportation assistance.  Let me know if you have questions and/or anything else I can do.  Don’t forget to include proof of age document from one of the choices on the second page of the application.  Thank you,     Halima Wan  Outpatient   [facility]  [address] [place] [address], [address]  [address] [phone]/Fax# [phone]  [email or web address]      Patient aware that she needs to include proof of identity, which she states she can make a copy at work.  Patient is able to mail application herself.  JASON will follow up next week to make sure patient mailed in application.

## 2024-01-05 NOTE — RESULT ENCOUNTER NOTE
Cindy,   Your iron levels are a little low. Continue to take your supplement and we can follow-up at subsequent appointments. Also, the pharmacy informed me your symbicort does require a prior authorization, our office should be working on. If it's still not ready by next week, please reach out.

## 2024-01-08 ENCOUNTER — TELEPHONE (OUTPATIENT)
Dept: HEMATOLOGY ONCOLOGY | Facility: CLINIC | Age: 39
End: 2024-01-08

## 2024-01-08 NOTE — TELEPHONE ENCOUNTER
I called Cindy in response to a referral that was received for patient to establish care with Hematology.     Outreach was made to schedule a consultation.    I left a voicemail explaining the reason for my call and advised patient to call [facility] at [phone].  Another attempt will be made to contact patient.

## 2024-01-11 NOTE — TELEPHONE ENCOUNTER
JASON called patient at [phone].  Patient states she mailed the application to [employer] the very next day she received it.  Mailed 1/6/24.  JASON will follow up with [place] to ensure receipt and processing time.  JASON remains available.

## 2024-01-24 ENCOUNTER — TELEPHONE (OUTPATIENT)
Dept: INTERNAL MEDICINE CLINIC | Facility: CLINIC | Age: 39
End: 2024-01-24

## 2024-01-24 NOTE — TELEPHONE ENCOUNTER
Patient needs prior auth for inhaler   budesonide-formoterol (Symbicort) 160-4.5 mcg/act inhaler [[id number]]

## 2024-01-25 ENCOUNTER — ANNUAL EXAM (OUTPATIENT)
Dept: OBGYN CLINIC | Facility: CLINIC | Age: 39
End: 2024-01-25
Payer: COMMERCIAL

## 2024-01-25 VITALS
HEIGHT: 63 IN | BODY MASS INDEX: 51.91 KG/M2 | DIASTOLIC BLOOD PRESSURE: 82 MMHG | WEIGHT: 293 LBS | SYSTOLIC BLOOD PRESSURE: 122 MMHG

## 2024-01-25 DIAGNOSIS — N63.20 MASS OF LEFT BREAST, UNSPECIFIED QUADRANT: ICD-10-CM

## 2024-01-25 DIAGNOSIS — N89.8 VAGINAL DISCHARGE: ICD-10-CM

## 2024-01-25 DIAGNOSIS — Z01.419 WOMEN'S ANNUAL ROUTINE GYNECOLOGICAL EXAMINATION: Primary | ICD-10-CM

## 2024-01-25 PROCEDURE — 81514 NFCT DS BV&VAGINITIS DNA ALG: CPT | Performed by: OBSTETRICS & GYNECOLOGY

## 2024-01-25 PROCEDURE — 99395 PREV VISIT EST AGE 18-39: CPT | Performed by: OBSTETRICS & GYNECOLOGY

## 2024-01-25 PROCEDURE — G0145 SCR C/V CYTO,THINLAYER,RESCR: HCPCS | Performed by: OBSTETRICS & GYNECOLOGY

## 2024-01-25 PROCEDURE — G0476 HPV COMBO ASSAY CA SCREEN: HCPCS | Performed by: OBSTETRICS & GYNECOLOGY

## 2024-01-25 PROCEDURE — 3074F SYST BP LT 130 MM HG: CPT | Performed by: OBSTETRICS & GYNECOLOGY

## 2024-01-25 NOTE — PROGRESS NOTES
Assessment/Plan:    No problem-specific Assessment & Plan notes found for this encounter.       Diagnoses and all orders for this visit:    Women's annual routine gynecological examination  -     Liquid-based pap, screening    Mass of left breast, unspecified quadrant  -     US breast left limited (diagnostic); Future  -     Mammo diagnostic left w 3d & cad; Future    Vaginal discharge  -     Molecular Vaginal Panel          Normal gynecological physical examination.  Self-breast examination stressed.  Discussed regular exercise, healthy diet, importance of vitamin D and calcium supplements.  Discussed importance of sun block use during periods of prolonged sun exposure.  Patient will be seen in 1 year for routine gynecologic and medical examination.  Patient will call office for any problems, concerns, or issues which may arise during the interim.     Subjective:          HPI    Patient ID: Cindy Bolden is a 38 y.o. female who presents today for her annual gynecologic and medical examination.  Patient presents with no significant complaints or concerns.  Patient is excited to get a gastric sleeve in March.  She states that she is following up closely with her PCP to have her medical conditions controlled before surgery.  Patient is a diabetic with an A1c of 6.5.  She states her PCP does not want to put her on any diabetic medications before her surgery.  Patient states that she is trying to have a well-balanced diet, cut out sugary beverages and snacks, and remain well-hydrated.  She is excited for her new future and wants to become physically active again.  Patient has her menstrual cycles and states that they are now regular.  She denies significant PMS symptoms.  Patient is up-to-date with her vaccines.  She is  to her .  She states that she has noticed a bad vaginal odor after intercourse which would last 3 days.  She denies abnormal discharge, vaginal itching, vaginal burning, and skin  irritation.  Patient reports doing self breast checks and has a family history positive for breast cancer in the maternal grandmother.  Patient states that she has noticed a small bump on the left breast.  She is unsure if she would be eligible for mammography screening at this time.  Patient is a mother of 6 kids and likes to spend her time with her children.  She has no other complaints or concerns at this time.    Menstrual status: Patient states that her cycles are regular and denies having significant PMS symptoms.    Vasomotor symptoms: Denies significant symptoms.    Patient reports normal appetite    Patient reports normal bowel and bladder habits    Patient denies any significant pelvic or abdominal pain    Patient denies any headaches, chest pain, shortness of breath fever shakes or chills    Patient denies any COVID 19 symptoms including cough or loss of taste or smell    COVID vaccine status: Up-to-date.    Medical problems: Scheduled to have a bariatric sleeve surgery in March.  History of diabetes.    Colonoscopy status: Not eligible for screening.    Mammogram status: Not yet eligible for screening but is wondering if she can start earlier due to family history of breast cancer.    The following portions of the patient's history were reviewed and updated as appropriate: allergies, current medications, past family history, past medical history, past social history, past surgical history and problem list.    Review of Systems   Constitutional: Negative.  Negative for appetite change, diaphoresis, fatigue, fever and unexpected weight change.   HENT: Negative.     Eyes: Negative.    Respiratory:  Positive for shortness of breath.    Cardiovascular:  Positive for chest pain.        Followed with PCP   Gastrointestinal: Negative.  Negative for abdominal pain, blood in stool, constipation, diarrhea, nausea and vomiting.   Endocrine: Negative.  Negative for cold intolerance and heat intolerance.  "  Genitourinary: Negative.  Negative for dysuria, frequency, hematuria, urgency, vaginal bleeding, vaginal discharge and vaginal pain.        Vaginal odor after intercourse   Musculoskeletal: Negative.    Skin: Negative.    Allergic/Immunologic: Negative.    Neurological: Negative.    Hematological: Negative.  Negative for adenopathy.   Psychiatric/Behavioral: Negative.           Objective:      /82   Ht 5' 3\" (1.6 m)   Wt (!) 144 kg (317 lb 3.2 oz)   LMP 12/31/2023 (Exact Date)   BMI 56.19 kg/m²          Physical Exam  Constitutional:       General: She is not in acute distress.     Appearance: Normal appearance. She is well-developed. She is not diaphoretic.   HENT:      Head: Normocephalic and atraumatic.   Eyes:      Pupils: Pupils are equal, round, and reactive to light.   Cardiovascular:      Rate and Rhythm: Normal rate and regular rhythm.      Heart sounds: Normal heart sounds. No murmur heard.     No friction rub. No gallop.   Pulmonary:      Effort: Pulmonary effort is normal.      Breath sounds: Normal breath sounds.   Chest:   Breasts:     Breasts are symmetrical.      Right: No inverted nipple, mass, nipple discharge, skin change or tenderness.      Left: Mass present. No inverted nipple, nipple discharge, skin change or tenderness.          Comments: 1 to 2 cm nontender, mobile mass palpated in the 3:00 area of the left breast.  Abdominal:      General: Bowel sounds are normal.      Palpations: Abdomen is soft.   Genitourinary:     Exam position: Supine.      Labia:         Right: No rash or lesion.         Left: No rash or lesion.       Vagina: Normal. No vaginal discharge, erythema, tenderness or bleeding.      Cervix: No discharge or friability.      Uterus: Not enlarged and not tender.       Adnexa:         Right: No mass, tenderness or fullness.          Left: No mass, tenderness or fullness.        Rectum: Normal. Guaiac result negative.   Musculoskeletal:         General: Normal range " of motion.      Cervical back: Normal range of motion and neck supple.   Lymphadenopathy:      Cervical: No cervical adenopathy.      Upper Body:      Right upper body: No supraclavicular adenopathy.      Left upper body: No supraclavicular adenopathy.   Skin:     General: Skin is warm and dry.      Findings: No rash.   Neurological:      General: No focal deficit present.      Mental Status: She is alert and oriented to person, place, and time.   Psychiatric:         Mood and Affect: Mood normal.         Speech: Speech normal.         Behavior: Behavior normal.         Thought Content: Thought content normal.         Judgment: Judgment normal.

## 2024-01-25 NOTE — PATIENT INSTRUCTIONS
Normal gynecological physical examination.  Self-breast examination stressed.  Discussed regular exercise, healthy diet, importance of vitamin D and calcium supplements.  Discussed importance of sun block use during periods of prolonged sun exposure.  Patient will be seen in 1 year for routine gynecologic and medical examination.  Patient will call office for any problems, concerns, or issues which may arise during the interim.    Diagnostic bilateral mammogram ordered for further evaluation of the patient's breast mass.  Diagnostic ultrasound of the left breast ordered for further evaluation of the patient's breast mass.  Vaginal cultures obtained in office.  Patient will be notified of the results.  Pending above results, patient will be notified for consultation regarding further treatment.

## 2024-01-26 LAB
C GLABRATA DNA VAG QL NAA+PROBE: NEGATIVE
C KRUSEI DNA VAG QL NAA+PROBE: NEGATIVE
CANDIDA SP 6 PNL VAG NAA+PROBE: NEGATIVE
HPV HR 12 DNA CVX QL NAA+PROBE: NEGATIVE
HPV16 DNA CVX QL NAA+PROBE: NEGATIVE
HPV18 DNA CVX QL NAA+PROBE: NEGATIVE
T VAGINALIS DNA VAG QL NAA+PROBE: POSITIVE
VAGINOSIS/ITIS DNA PNL VAG PROBE+SIG AMP: POSITIVE

## 2024-01-29 ENCOUNTER — TELEPHONE (OUTPATIENT)
Dept: OBGYN CLINIC | Facility: CLINIC | Age: 39
End: 2024-01-29

## 2024-01-29 NOTE — TELEPHONE ENCOUNTER
----- Message from Cindy Bolden sent at 1/26/2024 11:43 PM EST -----  Regarding: RE: Positive Results  Contact: [phone]  India Morris I’m really worried about the results and If i do need antibiotics can they be called in as soon as possible so I can start them right away? Is this an STD?

## 2024-01-30 ENCOUNTER — TELEPHONE (OUTPATIENT)
Dept: OBGYN CLINIC | Facility: CLINIC | Age: 39
End: 2024-01-30

## 2024-01-30 DIAGNOSIS — B96.89 BV (BACTERIAL VAGINOSIS): Primary | ICD-10-CM

## 2024-01-30 DIAGNOSIS — A59.9 TRICHOMONIASIS: ICD-10-CM

## 2024-01-30 DIAGNOSIS — N76.0 BV (BACTERIAL VAGINOSIS): Primary | ICD-10-CM

## 2024-01-30 RX ORDER — METRONIDAZOLE 500 MG/1
500 TABLET ORAL EVERY 12 HOURS SCHEDULED
Qty: 14 TABLET | Refills: 0 | Status: SHIPPED | OUTPATIENT
Start: 2024-01-30 | End: 2024-02-06

## 2024-01-30 NOTE — TELEPHONE ENCOUNTER
Patient informed of culture results & recommended treatment with precautions given/JENELLE Morris MD.  Patient states her partner already treated as precaution.Please sign off on Flagyl prescription to [facility] ([address]

## 2024-01-30 NOTE — TELEPHONE ENCOUNTER
----- Message from Paulo Morris MD sent at 1/26/2024 12:51 PM EST -----  Vaginal cultures positive for BV and trichomonas    Please treat with Flagyl 500 mg dispense 14 tablets take 1 tablet twice daily for 7 days    Please schedule for test of cure follow-up in approximately 2 to 3 weeks    Thanks   Head is atraumatic. Head shape is symmetrical.

## 2024-01-31 LAB
LAB AP GYN PRIMARY INTERPRETATION: NORMAL
Lab: NORMAL
PATH INTERP SPEC-IMP: NORMAL

## 2024-02-01 ENCOUNTER — TELEPHONE (OUTPATIENT)
Dept: HEMATOLOGY ONCOLOGY | Facility: CLINIC | Age: 39
End: 2024-02-01

## 2024-02-01 ENCOUNTER — OFFICE VISIT (OUTPATIENT)
Dept: HEMATOLOGY ONCOLOGY | Facility: CLINIC | Age: 39
End: 2024-02-01
Payer: COMMERCIAL

## 2024-02-01 VITALS
RESPIRATION RATE: 17 BRPM | HEIGHT: 63 IN | TEMPERATURE: 98 F | DIASTOLIC BLOOD PRESSURE: 82 MMHG | HEART RATE: 102 BPM | BODY MASS INDEX: 51.91 KG/M2 | WEIGHT: 293 LBS | OXYGEN SATURATION: 96 % | SYSTOLIC BLOOD PRESSURE: 120 MMHG

## 2024-02-01 DIAGNOSIS — R79.0 LOW FERRITIN: ICD-10-CM

## 2024-02-01 DIAGNOSIS — E61.1 IRON DEFICIENCY: ICD-10-CM

## 2024-02-01 DIAGNOSIS — E66.01 CLASS 3 SEVERE OBESITY WITH SERIOUS COMORBIDITY AND BODY MASS INDEX (BMI) OF 50.0 TO 59.9 IN ADULT (HCC): ICD-10-CM

## 2024-02-01 DIAGNOSIS — Z82.49 FAMILY HISTORY OF BRAIN ANEURYSM: ICD-10-CM

## 2024-02-01 DIAGNOSIS — R73.03 PREDIABETES: ICD-10-CM

## 2024-02-01 DIAGNOSIS — D64.9 ANEMIA: ICD-10-CM

## 2024-02-01 DIAGNOSIS — D50.0 IRON DEFICIENCY ANEMIA DUE TO CHRONIC BLOOD LOSS: Primary | ICD-10-CM

## 2024-02-01 DIAGNOSIS — F41.9 ANXIETY: ICD-10-CM

## 2024-02-01 DIAGNOSIS — Z01.818 PRE-OPERATIVE CLEARANCE: ICD-10-CM

## 2024-02-01 DIAGNOSIS — G93.2 IDIOPATHIC INTRACRANIAL HYPERTENSION: ICD-10-CM

## 2024-02-01 DIAGNOSIS — G43.719 INTRACTABLE CHRONIC MIGRAINE WITHOUT AURA AND WITHOUT STATUS MIGRAINOSUS: ICD-10-CM

## 2024-02-01 PROCEDURE — 99245 OFF/OP CONSLTJ NEW/EST HI 55: CPT | Performed by: INTERNAL MEDICINE

## 2024-02-01 RX ORDER — SODIUM CHLORIDE 9 MG/ML
20 INJECTION, SOLUTION INTRAVENOUS ONCE
OUTPATIENT
Start: 2024-02-07

## 2024-02-01 NOTE — PROGRESS NOTES
Cindy Bolden  1985  [facility] [place]  [address] [address] [phone]    Chief Complaint   Patient presents with    Consult         Oncology History    No history exists.       History of Present Illness:  January 1, 2024 WBC 8.6, hemoglobin 11.6, MCV 81, platelet count 366, normal differential.  CMP normal.  Iron saturation 11%.  Patient has long history of iron deficiency, many years.  She has been treated in the past with oral iron which she tolerated poorly with abdominal pain, constipation, etc.  Iron deficiency is attributed to menorrhagia.  Menses q. 28 days, 8 days duration, heavy flow.  EGD November 15, 2023 showed gastritis with mild erythematous mucosa.  Biopsy showed no pathologic abnormality.  Prior EGD in July 2021 showed erythematous GE junction mucosa.  Biopsy of the duodenum showed no pathologic abnormality, intraepithelial lymphocytes, etc.  Gastric biopsy showed inactive gastritis.    Review of Systems   Constitutional:  Positive for fatigue. Negative for appetite change, diaphoresis and fever.   HENT:  Negative for sinus pain.    Eyes:  Negative for discharge.   Respiratory:  Negative for cough and shortness of breath.    Cardiovascular:  Negative for chest pain.   Gastrointestinal:  Negative for abdominal pain, constipation and diarrhea.   Endocrine: Negative for cold intolerance.   Genitourinary:  Negative for difficulty urinating and hematuria.   Musculoskeletal:  Negative for joint swelling.   Skin:  Negative for rash.   Allergic/Immunologic: Negative for environmental allergies.   Neurological:  Negative for dizziness and headaches.   Hematological:  Negative for adenopathy.   Psychiatric/Behavioral:  Negative for agitation.        Patient Active Problem List   Diagnosis    SOB (shortness of breath)    Acute asthma exacerbation    Class 3 severe obesity with serious comorbidity and body mass index  (BMI) of 50.0 to 59.9 in adult (HCC)    Chest tightness    Type 2 diabetes mellitus without complication, without long-term current use of insulin (HCC)    Moderate persistent asthma without complication    COVID-19    Odynophagia    Idiopathic intracranial hypertension    Right flank pain    Intractable chronic migraine without aura and without status migrainosus    Family history of brain aneurysm    Chronic intractable headache    Annual physical exam    Iron deficiency anemia    Anxiety    Depression    Bilateral carpal tunnel syndrome     Past Medical History:   Diagnosis Date    Asthma      Past Surgical History:   Procedure Laterality Date     SECTION      FL LUMBAR PUNCTURE DIAGNOSTIC  2022     Family History   Problem Relation Age of Onset    No Known Problems Other     Arthritis Mother     Fibromyalgia Mother     Diabetes Family     Cancer Family     Heart disease Family      Social History     Socioeconomic History    Marital status: /Civil Union     Spouse name: Not on file    Number of children: Not on file    Years of education: Not on file    Highest education level: Not on file   Occupational History    Not on file   Tobacco Use    Smoking status: Never    Smokeless tobacco: Never   Vaping Use    Vaping status: Never Used   Substance and Sexual Activity    Alcohol use: No    Drug use: No    Sexual activity: Yes   Other Topics Concern    Not on file   Social History Narrative    Social problem      Social Determinants of Health     Financial Resource Strain: Low Risk  (1/3/2024)    Overall Financial Resource Strain (CARDIA)     Difficulty of Paying Living Expenses: Not hard at all   Food Insecurity: No Food Insecurity (1/3/2024)    Hunger Vital Sign     Worried About Running Out of Food in the Last Year: Never true     Ran Out of Food in the Last Year: Never true   Transportation Needs: No Transportation Needs (1/3/2024)    PRAPARE - Transportation     Lack of Transportation  (Medical): No     Lack of Transportation (Non-Medical): No   Physical Activity: Inactive (9/29/2021)    Exercise Vital Sign     Days of Exercise per Week: 0 days     Minutes of Exercise per Session: 0 min   Stress: Stress Concern Present (9/29/2021)    [employer] - Occupational Stress Questionnaire     Feeling of Stress : To some extent   Social Connections: Moderately Isolated (9/29/2021)    Social Connection and Isolation Panel [NHANES]     Frequency of Communication with Friends and Family: More than three times a week     Frequency of Social Gatherings with Friends and Family: More than three times a week     Attends [employer] Services: Never     Active Member of Clubs or Organizations: No     Attends Club or Organization Meetings: Never     Marital Status:    Intimate Partner Violence: Not At Risk (9/29/2021)    Humiliation, Afraid, Rape, and Kick questionnaire     Fear of Current or Ex-Partner: No     Emotionally Abused: No     Physically Abused: No     Sexually Abused: No   Housing Stability: Low Risk  (1/9/2023)    Housing Stability Vital Sign     Unable to Pay for Housing in the Last Year: No     Number of Places Lived in the Last Year: 1     Unstable Housing in the Last Year: No       Current Outpatient Medications:     acetaZOLAMIDE (DIAMOX) 250 mg tablet, TAKE 2 TABLETS BY MOUTH EVERY MORNING, Disp: 60 tablet, Rfl: 5    budesonide-formoterol (Symbicort) 160-4.5 mcg/act inhaler, Inhale 2 puffs 2 (two) times a day Rinse mouth after use. Use additional puff if needed daily, Disp: 10.2 g, Rfl: 3    gabapentin (NEURONTIN) 100 mg capsule, Take 1 capsule (100 mg total) by mouth 3 (three) times a day, Disp: 30 capsule, Rfl: 2    metroNIDAZOLE (FLAGYL) 500 mg tablet, Take 1 tablet (500 mg total) by mouth every 12 (twelve) hours for 7 days, Disp: 14 tablet, Rfl: 0    ferrous sulfate 324 (65 Fe) mg, Take 1 tablet (324 mg total) by mouth every other day (Patient not taking:  Reported on 11/3/2023), Disp: 30 tablet, Rfl: 3    indomethacin (INDOCIN) 25 mg capsule, TAKE 1 TO 2 CAPSULES BY MOUTH EVERY 8 HOURS AS NEEDED FOR SEVERE HEADACHE WITH FOOD (Patient not taking: Reported on 11/3/2023), Disp: 15 capsule, Rfl: 1    pantoprazole (PROTONIX) 40 mg tablet, TAKE 1 TABLET BY MOUTH EVERY DAY (Patient not taking: Reported on 11/3/2023), Disp: 90 tablet, Rfl: 3    topiramate (TOPAMAX) 50 MG tablet, TAKE 1 AND 1/2 TABLETS (75MG) AT BEDTIME FOR 1 WEEK THEN TAKE 2 TABLETS (100MG) AT BEDTIME (Patient not taking: Reported on 1/25/2024), Disp: 60 tablet, Rfl: 5  No Known Allergies  Vitals:    02/01/24 1508   BP: 120/82   Pulse: 102   Resp: 17   Temp: 98 °F (36.7 °C)   SpO2: 96%       Physical Exam  Constitutional:       Appearance: She is well-developed.   HENT:      Head: Normocephalic and atraumatic.   Eyes:      Pupils: Pupils are equal, round, and reactive to light.   Cardiovascular:      Rate and Rhythm: Normal rate.      Heart sounds: No murmur heard.  Pulmonary:      Effort: No respiratory distress.      Breath sounds: No wheezing or rales.   Abdominal:      General: There is no distension.      Palpations: Abdomen is soft.      Tenderness: There is no abdominal tenderness. There is no rebound.   Musculoskeletal:         General: No tenderness.      Cervical back: Neck supple.   Lymphadenopathy:      Cervical: No cervical adenopathy.   Skin:     General: Skin is warm.      Findings: No rash.   Neurological:      Mental Status: She is alert and oriented to person, place, and time.      Deep Tendon Reflexes: Reflexes normal.   Psychiatric:         Thought Content: Thought content normal.           Labs:  CBC, Coags, BMP, Mg, Phos     Imaging  No results found.  I reviewed the above laboratory and imaging data.    Discussion/Summary: In summary, this is a 38-year-old female with a history of iron deficiency anemia as outlined.  Hemoglobin has fluctuated about the lower end of the reference  range.  Iron saturation is consistent with iron deficiency.  Oral iron is poorly tolerated due to constipation, abdominal pain.  Parenteral iron replacement is arranged.  Previous EGD and duodenal biopsy showed no evidence of celiac disease.  Serologies are requested for completeness though I expect these will be negative.  Most likely iron deficiency is resulting from previous menorrhagia.  She anticipates bariatric surgery in the near future.  Parenteral iron will help going into the surgery.  We reviewed that it is likely that she will need some maintenance parenteral iron infusion thereafter.  Patient voiced understanding and agreement.

## 2024-02-02 ENCOUNTER — TELEPHONE (OUTPATIENT)
Age: 39
End: 2024-02-02

## 2024-02-02 ENCOUNTER — PATIENT MESSAGE (OUTPATIENT)
Age: 39
End: 2024-02-02

## 2024-02-02 DIAGNOSIS — J45.901 ASTHMA WITH ACUTE EXACERBATION, UNSPECIFIED ASTHMA SEVERITY, UNSPECIFIED WHETHER PERSISTENT: Primary | ICD-10-CM

## 2024-02-02 DIAGNOSIS — D50.0 IRON DEFICIENCY ANEMIA DUE TO CHRONIC BLOOD LOSS: Primary | ICD-10-CM

## 2024-02-02 RX ORDER — FLUTICASONE PROPIONATE AND SALMETEROL XINAFOATE 45; 21 UG/1; UG/1
2 AEROSOL, METERED RESPIRATORY (INHALATION) 2 TIMES DAILY
Qty: 12 G | Refills: 5 | Status: SHIPPED | OUTPATIENT
Start: 2024-02-02

## 2024-02-02 RX ORDER — SODIUM CHLORIDE 9 MG/ML
20 INJECTION, SOLUTION INTRAVENOUS ONCE
Status: CANCELLED | OUTPATIENT
Start: 2024-02-07

## 2024-02-02 NOTE — TELEPHONE ENCOUNTER
Venofer added to plan in place of feraheme due to insurance preference. Please schedule out venofer. I sent a [employer] message letting the patient know the plan. Thanks!

## 2024-02-06 ENCOUNTER — TELEPHONE (OUTPATIENT)
Dept: INFUSION CENTER | Facility: HOSPITAL | Age: 39
End: 2024-02-06

## 2024-02-06 NOTE — TELEPHONE ENCOUNTER
Left voicemail for patient confirming that [facility] is able to order a lyft for tomorrows infusion appointment. Told patient to be ready for  at 2:00 and that she will receive a message when the lyft is ordered. Left a call back number in case there are any questions.

## 2024-02-07 ENCOUNTER — HOSPITAL ENCOUNTER (OUTPATIENT)
Dept: INFUSION CENTER | Facility: HOSPITAL | Age: 39
Discharge: HOME | End: 2024-02-07
Attending: INTERNAL MEDICINE
Payer: COMMERCIAL

## 2024-02-07 VITALS
RESPIRATION RATE: 18 BRPM | HEART RATE: 71 BPM | SYSTOLIC BLOOD PRESSURE: 128 MMHG | DIASTOLIC BLOOD PRESSURE: 82 MMHG | TEMPERATURE: 97.3 F

## 2024-02-07 DIAGNOSIS — D50.0 IRON DEFICIENCY ANEMIA DUE TO CHRONIC BLOOD LOSS: Primary | ICD-10-CM

## 2024-02-07 PROCEDURE — 96365 THER/PROPH/DIAG IV INF INIT: CPT

## 2024-02-07 PROCEDURE — 96366 THER/PROPH/DIAG IV INF ADDON: CPT

## 2024-02-07 RX ORDER — SODIUM CHLORIDE 9 MG/ML
20 INJECTION, SOLUTION INTRAVENOUS ONCE
Status: CANCELLED | OUTPATIENT
Start: 2024-02-14

## 2024-02-07 RX ORDER — SODIUM CHLORIDE 9 MG/ML
20 INJECTION, SOLUTION INTRAVENOUS ONCE
Status: COMPLETED | OUTPATIENT
Start: 2024-02-07 | End: 2024-02-07

## 2024-02-07 RX ADMIN — IRON SUCROSE 300 MG: 20 INJECTION, SOLUTION INTRAVENOUS at 14:24

## 2024-02-07 RX ADMIN — SODIUM CHLORIDE 20 ML/HR: 0.9 INJECTION, SOLUTION INTRAVENOUS at 14:24

## 2024-02-07 NOTE — PROGRESS NOTES
Cindy Bolden  tolerated treatment well with no complications.      Cindy Bolden is aware of future appt on 2/14 at 230.     AVS printed and given to Cindy Bolden:  Yes  pt has calendar of appts

## 2024-02-14 ENCOUNTER — HOSPITAL ENCOUNTER (OUTPATIENT)
Dept: INFUSION CENTER | Facility: HOSPITAL | Age: 39
Discharge: HOME | End: 2024-02-14
Attending: INTERNAL MEDICINE
Payer: COMMERCIAL

## 2024-02-14 VITALS
DIASTOLIC BLOOD PRESSURE: 81 MMHG | TEMPERATURE: 97.2 F | HEART RATE: 79 BPM | SYSTOLIC BLOOD PRESSURE: 120 MMHG | RESPIRATION RATE: 18 BRPM

## 2024-02-14 DIAGNOSIS — D50.0 IRON DEFICIENCY ANEMIA DUE TO CHRONIC BLOOD LOSS: Primary | ICD-10-CM

## 2024-02-14 RX ORDER — SODIUM CHLORIDE 9 MG/ML
20 INJECTION, SOLUTION INTRAVENOUS ONCE
Status: CANCELLED | OUTPATIENT
Start: 2024-02-21

## 2024-02-14 RX ORDER — SODIUM CHLORIDE 9 MG/ML
20 INJECTION, SOLUTION INTRAVENOUS ONCE
Status: COMPLETED | OUTPATIENT
Start: 2024-02-14 | End: 2024-02-14

## 2024-02-14 RX ADMIN — SODIUM CHLORIDE 20 ML/HR: 0.9 INJECTION, SOLUTION INTRAVENOUS at 15:03

## 2024-02-14 RX ADMIN — IRON SUCROSE 300 MG: 20 INJECTION, SOLUTION INTRAVENOUS at 15:03

## 2024-02-14 NOTE — PLAN OF CARE
Problem: Potential for Falls  Goal: Patient will remain free of falls  Description: INTERVENTIONS:  - Educate patient/family on patient safety including physical limitations  - Instruct patient to call for assistance with activity   - Consult OT/PT to assist with strengthening/mobility   - Keep Call bell within reach  - Keep bed low and locked with side rails adjusted as appropriate  - Keep care items and personal belongings within reach  - Initiate and maintain comfort rounds  - Make Fall Risk Sign visible to staff  - Apply yellow socks and bracelet for high fall risk patients  - Consider moving patient to room near nurses station  2/14/2024 1509 by Hien Green, RN  Outcome: Progressing  2/14/2024 1509 by Hien Green, RN  Outcome: Progressing

## 2024-02-14 NOTE — PROGRESS NOTES
Cindy Bolden  tolerated treatment well with no complications.      Cindy Bolden is aware of future appt on  2/21 @ 8435    AVS printed and given to Cindy Bolden:  Yes

## 2024-02-14 NOTE — PLAN OF CARE
Problem: Potential for Falls  Goal: Patient will remain free of falls  Description: INTERVENTIONS:  - Educate patient/family on patient safety including physical limitations  - Instruct patient to call for assistance with activity   - Consult OT/PT to assist with strengthening/mobility   - Keep Call bell within reach  - Keep bed low and locked with side rails adjusted as appropriate  - Keep care items and personal belongings within reach  - Initiate and maintain comfort rounds  - Make Fall Risk Sign visible to staff  - Apply yellow socks and bracelet for high fall risk patients  - Consider moving patient to room near nurses station  2/14/2024 1631 by Hien Green, KENAN  Outcome: Progressing  2/14/2024 1509 by Hien Green, RN  Outcome: Progressing  2/14/2024 1509 by Hien Green, RN  Outcome: Progressing

## 2024-02-19 ENCOUNTER — OFFICE VISIT (OUTPATIENT)
Dept: OBGYN CLINIC | Facility: CLINIC | Age: 39
End: 2024-02-19
Payer: COMMERCIAL

## 2024-02-19 VITALS
HEIGHT: 63 IN | SYSTOLIC BLOOD PRESSURE: 122 MMHG | WEIGHT: 293 LBS | BODY MASS INDEX: 51.91 KG/M2 | DIASTOLIC BLOOD PRESSURE: 80 MMHG

## 2024-02-19 DIAGNOSIS — B96.89 BV (BACTERIAL VAGINOSIS): ICD-10-CM

## 2024-02-19 DIAGNOSIS — A59.9 TRICHOMONIASIS, UNSPECIFIED: Primary | ICD-10-CM

## 2024-02-19 DIAGNOSIS — N76.0 BV (BACTERIAL VAGINOSIS): ICD-10-CM

## 2024-02-19 PROCEDURE — 99213 OFFICE O/P EST LOW 20 MIN: CPT | Performed by: OBSTETRICS & GYNECOLOGY

## 2024-02-19 PROCEDURE — 87661 TRICHOMONAS VAGINALIS AMPLIF: CPT | Performed by: OBSTETRICS & GYNECOLOGY

## 2024-02-19 PROCEDURE — 87563 M. GENITALIUM AMP PROBE: CPT | Performed by: OBSTETRICS & GYNECOLOGY

## 2024-02-19 NOTE — PROGRESS NOTES
Assessment/Plan:    No problem-specific Assessment & Plan notes found for this encounter.       Diagnoses and all orders for this visit:    Trichomoniasis  -     Trichomonas Vaginalis, MILLIE    BV (bacterial vaginosis)      Subjective:      Patient ID: Cindy Bolden is a 38 y.o. female.    Cindy is a 38-year-old female presenting to the office for a test of cure.  She was seen in our office for odorous vaginal discharge on 1/24/2024 and tested positive for trichomonas and bacterial vaginosis.  She was placed on Flagyl and completed all doses.  She states the discharge has resolved since completing the antibiotics.  Her  was also tested for trichomonas and tested negative.  They have been sexually active and using protection since completing the antibiotics.  Repeat cultures obtained today , office will be in contact. Follow-up as needed      Total time of today's visit was 20 minutes of which greater than 50% was spent face-to-face counseling the patient as well as coordination of care, review of chart and lab values, physical examination as well as computer entry into the [facility] medical record system.      The following portions of the patient's history were reviewed and updated as appropriate: allergies, current medications, past family history, past medical history, past social history, past surgical history, and problem list.    Review of Systems   Constitutional: Negative.  Negative for appetite change, diaphoresis, fatigue, fever and unexpected weight change.   HENT: Negative.     Eyes: Negative.    Respiratory: Negative.     Cardiovascular: Negative.    Gastrointestinal: Negative.  Negative for abdominal pain, blood in stool, constipation, diarrhea, nausea and vomiting.   Endocrine: Negative.  Negative for cold intolerance and heat intolerance.   Genitourinary: Negative.  Negative for dysuria, frequency, hematuria, urgency, vaginal bleeding, vaginal discharge and vaginal pain.   Musculoskeletal:  "Negative.    Skin: Negative.    Allergic/Immunologic: Negative.    Neurological: Negative.    Hematological: Negative.  Negative for adenopathy.   Psychiatric/Behavioral: Negative.           Objective:      /80   Ht 5' 3\" (1.6 m)   Wt (!) 143 kg (315 lb 9.6 oz)   LMP 12/31/2023 (Exact Date)   BMI 55.91 kg/m²          Physical Exam  Constitutional:       Appearance: She is well-developed.   HENT:      Head: Normocephalic.   Eyes:      Pupils: Pupils are equal, round, and reactive to light.   Cardiovascular:      Rate and Rhythm: Normal rate and regular rhythm.   Pulmonary:      Effort: Pulmonary effort is normal.      Breath sounds: Normal breath sounds.   Abdominal:      Palpations: Abdomen is soft.   Genitourinary:     Vagina: No vaginal discharge.      Comments: Cultures obtained.-  Musculoskeletal:         General: Normal range of motion.      Cervical back: Normal range of motion and neck supple.   Skin:     General: Skin is warm and dry.   Neurological:      Mental Status: She is alert and oriented to person, place, and time.   Psychiatric:         Behavior: Behavior normal.         Thought Content: Thought content normal.         Judgment: Judgment normal.           "

## 2024-02-19 NOTE — PATIENT INSTRUCTIONS
Topic: Trichomonas and BV  Repeat cultures obtained today , office will be in contact.   Follow-up as needed.  Call office with any questions, concerns, or worsening symptoms.

## 2024-02-21 ENCOUNTER — DOCUMENTATION (OUTPATIENT)
Dept: HEMATOLOGY ONCOLOGY | Facility: CLINIC | Age: 39
End: 2024-02-21

## 2024-02-21 ENCOUNTER — HOSPITAL ENCOUNTER (OUTPATIENT)
Dept: INFUSION CENTER | Facility: HOSPITAL | Age: 39
Discharge: HOME | End: 2024-02-21
Attending: INTERNAL MEDICINE
Payer: COMMERCIAL

## 2024-02-21 VITALS
HEART RATE: 69 BPM | DIASTOLIC BLOOD PRESSURE: 81 MMHG | SYSTOLIC BLOOD PRESSURE: 141 MMHG | TEMPERATURE: 98.7 F | RESPIRATION RATE: 18 BRPM

## 2024-02-21 DIAGNOSIS — D50.0 IRON DEFICIENCY ANEMIA DUE TO CHRONIC BLOOD LOSS: Primary | ICD-10-CM

## 2024-02-21 LAB
M GENITALIUM DNA SPEC QL NAA+PROBE: NEGATIVE
T VAGINALIS DNA SPEC QL NAA+PROBE: NEGATIVE

## 2024-02-21 PROCEDURE — 96366 THER/PROPH/DIAG IV INF ADDON: CPT

## 2024-02-21 PROCEDURE — 96365 THER/PROPH/DIAG IV INF INIT: CPT

## 2024-02-21 RX ORDER — SODIUM CHLORIDE 9 MG/ML
20 INJECTION, SOLUTION INTRAVENOUS ONCE
Status: COMPLETED | OUTPATIENT
Start: 2024-02-21 | End: 2024-02-21

## 2024-02-21 RX ORDER — SODIUM CHLORIDE 9 MG/ML
20 INJECTION, SOLUTION INTRAVENOUS ONCE
Status: CANCELLED | OUTPATIENT
Start: 2024-02-29

## 2024-02-21 RX ADMIN — SODIUM CHLORIDE 20 ML/HR: 0.9 INJECTION, SOLUTION INTRAVENOUS at 14:36

## 2024-02-21 RX ADMIN — IRON SUCROSE 300 MG: 20 INJECTION, SOLUTION INTRAVENOUS at 14:36

## 2024-02-21 NOTE — PROGRESS NOTES
Cindy Bolden  tolerated treatment well with no complications.      Cindy Bolden is aware of future appt on 2/29/24 at 0900.     AVS declined by Cindy Bolden.

## 2024-02-21 NOTE — PROGRESS NOTES
Oncology Finance Advocacy Intake and Intervention  Oncology Finance Counselor/Advocate placed call to patient. This writer informed patient that this writer is here to assist patient with billing questions, financial assistance, payment/payment plans, quotes, copayment assistance, insurance optimization, and insurance navigation.    This writer conducted a thorough benefit review of copayment, deductible, and out of pocket cost. This information is documented below and has been reviewed with patient.     Copayment: NONE  Deductible: NONE  Out of Pocket Cost: NONE  Insurance optimization (Limited benefit vs self-pay): PT HAS INSURANCE  Patient assistance status: NONE  Free Drug Applications: NONE  Oral Chemo Application: NONE  BIN#:  PCN#:  GRP#:  Copay:$  Interventions:  Pt has active [employer]. Called pt & went over the benefits & she thanked me for the call & the Info. Added to careteam        Information above was review thoroughly with patient and patient was advise of possible assistance programs/interventions. If any question arise patient can contact this writer at below information. This information was given to patient at time of contact.      Kimberly Gonzalez  Phone:[phone]  Email: [email or web address]

## 2024-02-22 ENCOUNTER — TELEPHONE (OUTPATIENT)
Dept: BARIATRICS | Facility: CLINIC | Age: 39
End: 2024-02-22

## 2024-02-23 ENCOUNTER — TELEPHONE (OUTPATIENT)
Dept: NEUROLOGY | Facility: CLINIC | Age: 39
End: 2024-02-23

## 2024-02-23 NOTE — TELEPHONE ENCOUNTER
Patient called and was trying to get checked in for VV with Santos.  I called CV office and transferred all to see if patient could still be seen or if appointment had to get rescheduled.

## 2024-02-27 ENCOUNTER — CLINICAL SUPPORT (OUTPATIENT)
Dept: BARIATRICS | Facility: CLINIC | Age: 39
End: 2024-02-27

## 2024-02-27 VITALS — WEIGHT: 293 LBS | BODY MASS INDEX: 55.71 KG/M2

## 2024-02-27 DIAGNOSIS — E66.01 MORBID (SEVERE) OBESITY DUE TO EXCESS CALORIES (HCC): Primary | ICD-10-CM

## 2024-02-27 PROCEDURE — RECHECK

## 2024-02-27 NOTE — PROGRESS NOTES
Behavioral Health Follow Up Note        Name: Cindy Bolden            4/6 weight check   Starting weight 317.5  Last time weight 313.4  Today's weight 314.5  BMI:55.71    Surgery month:  May-June  Surgery deadline: Sept  Surgeon:Dr. Zhao   Type of Surgery: sleeve    Mental health and wellness -   Patient presents to the office today for a weight check and support visit. The pt shared that her spouse is very supportive by encouraging her to walk with him daily.  The pt shared some difficulties with sleep because of shoulder pain. Reports work is chaotic but manageable and this at times causes her not to eat three meals a day. Discussed how meal prepping can be beneficial.     Eating behaviors - one meal a day and grazing. Chewing food and practice 30/60 rule. 3-4 bottles a day of water, soda 1/2 a can. Stopped tracking discussed how tracking is helpful. Using measuring cups for portioning. Reading bariatric book.     Activity - 3 x a week uses stretch bands and weights for 30 minutes.     Progress toward program requirements    Workflow:  Psych and/or D+A Clearance: n/a  Blood Work: done, lipids pending   PCP letter: done  Surgeon Appt: done  EGD: done  Cardiac Risk Assessment with ECG: referral placed   Neurology consult : 2/23/2024   Hematology - referral placed   Sleep Studies: N/A  Nicotine test: n/a  NAFLD Score Calculated: yes   Hepatology consult: message sent to surgeon to order testing      Reviewed and discussed    Adequate hydration  Exercise  Meal planning and preparation  Lifestyle changes  Possible problems with poor eating habits  Practice mindful eating.    Setting aside time to eat slowly, and savor food    Goal: 1-increase walking 2-decrease rice and substitute with vegetables  Next appointment:3/28/24 RD

## 2024-02-29 ENCOUNTER — HOSPITAL ENCOUNTER (OUTPATIENT)
Dept: INFUSION CENTER | Facility: HOSPITAL | Age: 39
Discharge: HOME | End: 2024-02-29
Attending: INTERNAL MEDICINE
Payer: COMMERCIAL

## 2024-02-29 VITALS
TEMPERATURE: 96.9 F | HEART RATE: 68 BPM | DIASTOLIC BLOOD PRESSURE: 95 MMHG | SYSTOLIC BLOOD PRESSURE: 153 MMHG | RESPIRATION RATE: 18 BRPM

## 2024-02-29 DIAGNOSIS — D50.0 IRON DEFICIENCY ANEMIA DUE TO CHRONIC BLOOD LOSS: Primary | ICD-10-CM

## 2024-02-29 PROCEDURE — 96365 THER/PROPH/DIAG IV INF INIT: CPT

## 2024-02-29 PROCEDURE — 96366 THER/PROPH/DIAG IV INF ADDON: CPT

## 2024-02-29 RX ORDER — SODIUM CHLORIDE 9 MG/ML
20 INJECTION, SOLUTION INTRAVENOUS ONCE
Status: COMPLETED | OUTPATIENT
Start: 2024-02-29 | End: 2024-02-29

## 2024-02-29 RX ORDER — SODIUM CHLORIDE 9 MG/ML
20 INJECTION, SOLUTION INTRAVENOUS ONCE
Status: CANCELLED | OUTPATIENT
Start: 2024-03-06

## 2024-02-29 RX ADMIN — SODIUM CHLORIDE 20 ML/HR: 0.9 INJECTION, SOLUTION INTRAVENOUS at 09:48

## 2024-02-29 RX ADMIN — IRON SUCROSE 300 MG: 20 INJECTION, SOLUTION INTRAVENOUS at 09:49

## 2024-02-29 NOTE — PLAN OF CARE
Problem: SAFETY ADULT  Goal: Patient will remain free of falls  Description: INTERVENTIONS:  - Educate patient/family on patient safety including physical limitations  - Instruct patient to call for assistance with activity   - Consult OT/PT to assist with strengthening/mobility   - Keep Call bell within reach  - Keep bed low and locked with side rails adjusted as appropriate  - Keep care items and personal belongings within reach  - Initiate and maintain comfort rounds  - Make Fall Risk Sign visible to staff  - Apply yellow socks and bracelet for high fall risk patients  - Consider moving patient to room near nurses station  Outcome: Progressing     Problem: SAFETY ADULT  Goal: Maintain or return to baseline ADL function  Description: INTERVENTIONS:  -  Assess patient's ability to carry out ADLs; assess patient's baseline for ADL function and identify physical deficits which impact ability to perform ADLs (bathing, care of mouth/teeth, toileting, grooming, dressing, etc.)  - Assess/evaluate cause of self-care deficits   - Assess range of motion  - Assess patient's mobility; develop plan if impaired  - Assess patient's need for assistive devices and provide as appropriate  - Encourage maximum independence but intervene and supervise when necessary  - Involve family in performance of ADLs  - Assess for home care needs following discharge   - Consider OT consult to assist with ADL evaluation and planning for discharge  - Provide patient education as appropriate  Outcome: Progressing

## 2024-02-29 NOTE — PROGRESS NOTES
Cindy Bolden  tolerated treatment well with no complications.      Cindy Bolden is aware she has no future infusion appts, she is aware of future appt with Dr Allen 6/3 @ 9029 and aware to get labs prior to appt.    AVS printed and given to Cindy Bolden:  No (Declined by Cindy Bolden)

## 2024-03-08 ENCOUNTER — OFFICE VISIT (OUTPATIENT)
Dept: BARIATRICS | Facility: CLINIC | Age: 39
End: 2024-03-08
Payer: COMMERCIAL

## 2024-03-08 VITALS
SYSTOLIC BLOOD PRESSURE: 126 MMHG | HEIGHT: 63 IN | BODY MASS INDEX: 51.91 KG/M2 | WEIGHT: 293 LBS | DIASTOLIC BLOOD PRESSURE: 82 MMHG | HEART RATE: 72 BPM

## 2024-03-08 DIAGNOSIS — J45.40 MODERATE PERSISTENT ASTHMA WITHOUT COMPLICATION: ICD-10-CM

## 2024-03-08 DIAGNOSIS — E66.01 CLASS 3 SEVERE OBESITY WITH SERIOUS COMORBIDITY AND BODY MASS INDEX (BMI) OF 50.0 TO 59.9 IN ADULT (HCC): Primary | ICD-10-CM

## 2024-03-08 DIAGNOSIS — E11.9 TYPE 2 DIABETES MELLITUS WITHOUT COMPLICATION, WITHOUT LONG-TERM CURRENT USE OF INSULIN (HCC): ICD-10-CM

## 2024-03-08 PROCEDURE — 99214 OFFICE O/P EST MOD 30 MIN: CPT | Performed by: PHYSICIAN ASSISTANT

## 2024-03-08 NOTE — ASSESSMENT & PLAN NOTE
-She is pursuing  Vertical Sleeve Gastrectomy and the role of weight loss medications.Explained the importance of making lifestyle changes if utilizing medication to aid in weight loss  -Initial weight loss goal of 5-10% weight loss for improved health  -Screening labs and records reviewed from 1/2/24 cmp, a1c    To start on ozempic for diabetes. Side effects discussed. Discussed stopping medication at least 7 days prior to any surgery and liquid diet   Contraception tubal ligation    Lost 4 lb from last visit and has made many positive changes.  To continue to increase water intake and discussed increasing walking and watching step count  To start protein shake for breakfast

## 2024-03-08 NOTE — PROGRESS NOTES
Assessment/Plan:    Type 2 diabetes mellitus without complication, without long-term current use of insulin ([facility])    Lab Results   Component Value Date    HGBA1C 6.5 (H) 01/02/2024   To start on ozempic.    Moderate persistent asthma without complication  On symbicort and has improved    Class 3 severe obesity with serious comorbidity and body mass index (BMI) of 50.0 to 59.9 in adult ([facility])  -She is pursuing  Vertical Sleeve Gastrectomy and the role of weight loss medications.Explained the importance of making lifestyle changes if utilizing medication to aid in weight loss  -Initial weight loss goal of 5-10% weight loss for improved health  -Screening labs and records reviewed from 1/2/24 cmp, a1c    To start on ozempic for diabetes. Side effects discussed. Discussed stopping medication at least 7 days prior to any surgery and liquid diet   Contraception tubal ligation    Lost 4 lb from last visit and has made many positive changes.  To continue to increase water intake and discussed increasing walking and watching step count  To start protein shake for breakfast      Follow up in approximately in 4 months if surgery is not completed  I have spent a total time of 40 minutes on 03/08/24 in caring for this patient including Diagnostic results, Prognosis, Risks and benefits of tx options, Instructions for management, Patient and family education, Importance of tx compliance, Risk factor reductions, Impressions, Counseling / Coordination of care, Documenting in the medical record, Reviewing / ordering tests, medicine, procedures  , and Obtaining or reviewing history  .      Diagnoses and all orders for this visit:    Class 3 severe obesity with serious comorbidity and body mass index (BMI) of 50.0 to 59.9 in adult ([facility])    Type 2 diabetes mellitus without complication, without long-term current use of insulin ([facility])  -     semaglutide, 0.25 or 0.5 mg/dose, (Ozempic, 0.25 or 0.5 MG/DOSE,) 2 mg/3 mL injection pen; Inject  0.25 mg once a week for 1 month and then inject 0.5mg weekly    Moderate persistent asthma without complication          Subjective:   Chief Complaint   Patient presents with    Consult     MWM CONSULT        Patient ID: Cindy Bolden  is a 38 y.o. female with excess weight/obesity here to pursue weight management.    Past Medical History:   Diagnosis Date    Asthma        HPI: Here for MWM consult  Currently in the process of sleeve gastrectomy but needs to lose presurgery weight goal of 20-30 lb. Has lost 4 lb since 2/27. She has been modifying her diet.      Goals from last visit were to increase walking and decrease rice and substitute with vegetables . She did increase exercise and will be walking more now that asthma is improved      Obesity/Excess Weight:  Severity:  class III with DM, asthma, IIH  Onset:  years        Hydration:3-4 bottles of water, sometimes 1/2 soda and trying to seltzer now  Exercise:stretch bands and dumbbells (new change).  Plans on starting to walk  Occupation:management in bakery   Sleep:5-5.5 hours due to shoulder pain      Diet Recall:  B: sometimes skips but now planning on doing [employer] shake  L:turkey sandwich on [employer] or fruit/cheese  S:peanuts instead of chips  D:protein, vegetable, starch      The following portions of the patient's history were reviewed and updated as appropriate: She  has a past medical history of Asthma.  She   Patient Active Problem List    Diagnosis Date Noted    Bilateral carpal tunnel syndrome 01/03/2024    Annual physical exam 09/13/2023    Iron deficiency anemia 09/13/2023    Chronic intractable headache 07/08/2022    Intractable chronic migraine without aura and without status migrainosus 04/08/2022    Family history of brain aneurysm 04/08/2022    Odynophagia 07/15/2021    Idiopathic intracranial hypertension 07/15/2021    Right flank pain 07/15/2021    COVID-19 12/02/2020    Type 2 diabetes mellitus without complication, without long-term  current use of insulin ([facility]) 2018    Moderate persistent asthma without complication 2018    Depression 2016    Anxiety 2016    SOB (shortness of breath) 2016    Acute asthma exacerbation 2016    Class 3 severe obesity with serious comorbidity and body mass index (BMI) of 50.0 to 59.9 in adult ([facility]) 2016    Chest tightness 2016     She  has a past surgical history that includes  section; FL lumbar puncture diagnostic (2022); and Tubal ligation.  Her family history includes Arthritis in her mother; Cancer in her family; Diabetes in her family; Fibromyalgia in her mother; Heart disease in her family; No Known Problems in her other.  She  reports that she has never smoked. She has never used smokeless tobacco. She reports that she does not drink alcohol and does not use drugs.  Current Outpatient Medications   Medication Sig Dispense Refill    acetaZOLAMIDE (DIAMOX) 250 mg tablet TAKE 2 TABLETS BY MOUTH EVERY MORNING 60 tablet 5    budesonide-formoterol (Symbicort) 160-4.5 mcg/act inhaler Inhale 2 puffs 2 (two) times a day Rinse mouth after use. Use additional puff if needed daily 10.2 g 3    ferrous sulfate 324 (65 Fe) mg Take 1 tablet (324 mg total) by mouth every other day 30 tablet 3    semaglutide, 0.25 or 0.5 mg/dose, (Ozempic, 0.25 or 0.5 MG/DOSE,) 2 mg/3 mL injection pen Inject 0.25 mg once a week for 1 month and then inject 0.5mg weekly 3 mL 1    fluticasone-salmeterol (Advair HFA) 45-21 MCG/ACT inhaler Inhale 2 puffs 2 (two) times a day Rinse mouth after use. (Patient not taking: Reported on 3/8/2024) 12 g 5    gabapentin (NEURONTIN) 100 mg capsule Take 1 capsule (100 mg total) by mouth 3 (three) times a day (Patient not taking: Reported on 3/8/2024) 30 capsule 2    indomethacin (INDOCIN) 25 mg capsule TAKE 1 TO 2 CAPSULES BY MOUTH EVERY 8 HOURS AS NEEDED FOR SEVERE HEADACHE WITH FOOD (Patient not taking: Reported on 11/3/2023) 15 capsule 1     pantoprazole (PROTONIX) 40 mg tablet TAKE 1 TABLET BY MOUTH EVERY DAY (Patient not taking: Reported on 11/3/2023) 90 tablet 3    topiramate (TOPAMAX) 50 MG tablet TAKE 1 AND 1/2 TABLETS (75MG) AT BEDTIME FOR 1 WEEK THEN TAKE 2 TABLETS (100MG) AT BEDTIME (Patient not taking: Reported on 1/25/2024) 60 tablet 5     No current facility-administered medications for this visit.     Current Outpatient Medications on File Prior to Visit   Medication Sig    acetaZOLAMIDE (DIAMOX) 250 mg tablet TAKE 2 TABLETS BY MOUTH EVERY MORNING    budesonide-formoterol (Symbicort) 160-4.5 mcg/act inhaler Inhale 2 puffs 2 (two) times a day Rinse mouth after use. Use additional puff if needed daily    ferrous sulfate 324 (65 Fe) mg Take 1 tablet (324 mg total) by mouth every other day    fluticasone-salmeterol (Advair HFA) 45-21 MCG/ACT inhaler Inhale 2 puffs 2 (two) times a day Rinse mouth after use. (Patient not taking: Reported on 3/8/2024)    gabapentin (NEURONTIN) 100 mg capsule Take 1 capsule (100 mg total) by mouth 3 (three) times a day (Patient not taking: Reported on 3/8/2024)    indomethacin (INDOCIN) 25 mg capsule TAKE 1 TO 2 CAPSULES BY MOUTH EVERY 8 HOURS AS NEEDED FOR SEVERE HEADACHE WITH FOOD (Patient not taking: Reported on 11/3/2023)    pantoprazole (PROTONIX) 40 mg tablet TAKE 1 TABLET BY MOUTH EVERY DAY (Patient not taking: Reported on 11/3/2023)    topiramate (TOPAMAX) 50 MG tablet TAKE 1 AND 1/2 TABLETS (75MG) AT BEDTIME FOR 1 WEEK THEN TAKE 2 TABLETS (100MG) AT BEDTIME (Patient not taking: Reported on 1/25/2024)     No current facility-administered medications on file prior to visit.     She has No Known Allergies..    Review of Systems   Constitutional:  Negative for fever.   Respiratory:  Negative for shortness of breath.    Cardiovascular:  Negative for chest pain and palpitations.   Gastrointestinal:  Negative for abdominal pain, constipation, diarrhea and vomiting.   Genitourinary:  Negative for difficulty  "urinating.   Skin:  Negative for rash.   Neurological:  Negative for headaches.   Psychiatric/Behavioral:  Negative for dysphoric mood. The patient is not nervous/anxious.        Objective:    /82   Pulse 72   Ht 5' 3.2\" (1.605 m)   Wt (!) 141 kg (310 lb 3.2 oz)   BMI 54.60 kg/m²     Physical Exam  Vitals and nursing note reviewed.   Constitutional:       General: She is not in acute distress.     Appearance: She is well-developed. She is obese.   HENT:      Head: Normocephalic and atraumatic.   Eyes:      Conjunctiva/sclera: Conjunctivae normal.   Neck:      Thyroid: No thyromegaly.   Pulmonary:      Effort: Pulmonary effort is normal. No respiratory distress.   Skin:     Findings: No rash (visible).   Neurological:      Mental Status: She is alert and oriented to person, place, and time.   Psychiatric:         Mood and Affect: Mood normal.         Behavior: Behavior normal.         "

## 2024-03-13 ENCOUNTER — TELEPHONE (OUTPATIENT)
Age: 39
End: 2024-03-13

## 2024-03-13 NOTE — TELEPHONE ENCOUNTER
Patient has mammogram appointment 3/14 at   [facility], [address], [address], [place], [place], [address] at 930. She was told that the office has to set up a LYFT for her.

## 2024-03-14 ENCOUNTER — HOSPITAL ENCOUNTER (OUTPATIENT)
Dept: MAMMOGRAPHY | Facility: CLINIC | Age: 39
Discharge: HOME | End: 2024-03-14
Payer: COMMERCIAL

## 2024-03-14 ENCOUNTER — HOSPITAL ENCOUNTER (OUTPATIENT)
Dept: ULTRASOUND IMAGING | Facility: CLINIC | Age: 39
Discharge: HOME | End: 2024-03-14
Payer: COMMERCIAL

## 2024-03-14 VITALS — WEIGHT: 293 LBS | BODY MASS INDEX: 51.91 KG/M2 | HEIGHT: 63 IN

## 2024-03-14 DIAGNOSIS — N63.20 MASS OF LEFT BREAST, UNSPECIFIED QUADRANT: ICD-10-CM

## 2024-03-14 PROCEDURE — 76642 ULTRASOUND BREAST LIMITED: CPT

## 2024-03-14 PROCEDURE — 77066 DX MAMMO INCL CAD BI: CPT

## 2024-03-14 PROCEDURE — G0279 TOMOSYNTHESIS, MAMMO: HCPCS

## 2024-03-28 ENCOUNTER — CLINICAL SUPPORT (OUTPATIENT)
Dept: BARIATRICS | Facility: CLINIC | Age: 39
End: 2024-03-28

## 2024-03-28 VITALS — BODY MASS INDEX: 55.2 KG/M2 | WEIGHT: 293 LBS

## 2024-03-28 DIAGNOSIS — Z01.818 PRE-OPERATIVE CLEARANCE: Primary | ICD-10-CM

## 2024-03-28 DIAGNOSIS — G93.2 IDIOPATHIC INTRACRANIAL HYPERTENSION: ICD-10-CM

## 2024-03-28 DIAGNOSIS — R07.89 CHEST TIGHTNESS: ICD-10-CM

## 2024-03-28 DIAGNOSIS — Z82.49 FAMILY HISTORY OF BRAIN ANEURYSM: ICD-10-CM

## 2024-03-28 DIAGNOSIS — R06.02 SOB (SHORTNESS OF BREATH): ICD-10-CM

## 2024-03-28 DIAGNOSIS — G43.719 INTRACTABLE CHRONIC MIGRAINE WITHOUT AURA AND WITHOUT STATUS MIGRAINOSUS: ICD-10-CM

## 2024-03-28 DIAGNOSIS — E66.01 CLASS 3 SEVERE OBESITY DUE TO EXCESS CALORIES WITH SERIOUS COMORBIDITY AND BODY MASS INDEX (BMI) OF 50.0 TO 59.9 IN ADULT (HCC): ICD-10-CM

## 2024-03-28 DIAGNOSIS — E66.01 CLASS 3 SEVERE OBESITY WITH SERIOUS COMORBIDITY AND BODY MASS INDEX (BMI) OF 50.0 TO 59.9 IN ADULT (HCC): ICD-10-CM

## 2024-03-28 DIAGNOSIS — E11.9 TYPE 2 DIABETES MELLITUS WITHOUT COMPLICATION, WITHOUT LONG-TERM CURRENT USE OF INSULIN (HCC): ICD-10-CM

## 2024-03-28 PROCEDURE — RECHECK: Performed by: DIETITIAN, REGISTERED

## 2024-03-28 NOTE — PROGRESS NOTES
Bariatric Nutrition Assessment Note    Type of surgery    Preop  Surgery Date: Pt has 6 month program requirement   Today is 5/6 of program requirement     Surgeon: Dr. Renzo Zhao    Nutrition Assessment   Cindy Bolden  38 y.o.  female     Wt with BMI of 25: 142.8#  Pre-Op Excess Wt: 174.7#  LMP 03/12/2024 (Exact Date)    Wt (!) 141 kg (311 lb 9.6 oz)   LMP 03/12/2024 (Exact Date)   BMI 55.20 kg/m²      Pt maintained her weight within 2# over past month   - 6# x 5 months      NAFLD Fibrosis Score is: -.177    NAFLD Score Correlated Fibrosis Severity   <-1.455 F0-F2   -1.455-0.676 Indeterminate Score   >0.676 F3-F4   **Fibrosis Severity Scale: F0 = no fibrosis; F1= mild fibrosis; F2 = moderate fibrosis; F3 = severe fibrosis; F4 = cirrhosis    NAFLD Score Component Values:  Component Value Date   Age: 38 y.o.     BMI: 54.91 kg/m²    IFG or DM: Yes    AST: 14 U/L 1/2/2024   ALT: 13 U/L 1/2/2024   Platelet: 366 Thousands/uL 1/2/2024   Albumin: 3.8 g/dL 1/2/2024        [place]- [place]. Jeor Equation:  2263 calories per day   Estimated calories for weight loss 4270-4919 calories per day  ( 1-2# per wk wt loss - sedentary )  Estimated protein needs 65-97 g/d (1.0-1.5 gms/kg IBW )   Estimated fluid needs 64.9-76 ounces per day (30-35 ml/kg IBW )      Weight History   Onset of Obesity: Adult  Family history of obesity: No  Wt Loss Attempts: Counseling with  MD, prescribed medication   Portion control and increased activity, limiting her carbohydrate intake   Patient has tried the above for 6 months or more with insufficient weight loss or weight regain, which is why patient has requested to be evaluated for weight loss surgery today  Maximum Wt Lost: size 26 to size 18 over past one and a half years     Pt was seen by MWM provider and started on Ozempic for diabetes and weight loss     Review of History and Medications   Past Medical History:   Diagnosis Date    Asthma      Past Surgical History:   Procedure  Laterality Date     SECTION      FL LUMBAR PUNCTURE DIAGNOSTIC  2022    TUBAL LIGATION       Social History     Socioeconomic History    Marital status: /Civil Union     Spouse name: Not on file    Number of children: Not on file    Years of education: Not on file    Highest education level: Not on file   Occupational History    Not on file   Tobacco Use    Smoking status: Never    Smokeless tobacco: Never   Vaping Use    Vaping status: Never Used   Substance and Sexual Activity    Alcohol use: No    Drug use: No    Sexual activity: Yes   Other Topics Concern    Not on file   Social History Narrative    Social problem      Social Determinants of Health     Financial Resource Strain: Low Risk  (1/3/2024)    Overall Financial Resource Strain (CARDIA)     Difficulty of Paying Living Expenses: Not hard at all   Food Insecurity: No Food Insecurity (1/3/2024)    Hunger Vital Sign     Worried About Running Out of Food in the Last Year: Never true     Ran Out of Food in the Last Year: Never true   Transportation Needs: No Transportation Needs (1/3/2024)    PRAPARE - Transportation     Lack of Transportation (Medical): No     Lack of Transportation (Non-Medical): No   Physical Activity: Inactive (2021)    Exercise Vital Sign     Days of Exercise per Week: 0 days     Minutes of Exercise per Session: 0 min   Stress: Stress Concern Present (2021)    [place] [employer] - Occupational Stress Questionnaire     Feeling of Stress : To some extent   Social Connections: Moderately Isolated (2021)    Social Connection and Isolation Panel [NHANES]     Frequency of Communication with Friends and Family: More than three times a week     Frequency of Social Gatherings with Friends and Family: More than three times a week     Attends Adventism Services: Never     Active Member of Clubs or Organizations: No     Attends Club or Organization Meetings: Never     Marital Status:     Intimate Partner Violence: Not At Risk (9/29/2021)    Humiliation, Afraid, Rape, and Kick questionnaire     Fear of Current or Ex-Partner: No     Emotionally Abused: No     Physically Abused: No     Sexually Abused: No   Housing Stability: Low Risk  (1/9/2023)    Housing Stability Vital Sign     Unable to Pay for Housing in the Last Year: No     Number of Places Lived in the Last Year: 1     Unstable Housing in the Last Year: No       Current Outpatient Medications:     acetaZOLAMIDE (DIAMOX) 250 mg tablet, TAKE 2 TABLETS BY MOUTH EVERY MORNING, Disp: 60 tablet, Rfl: 5    budesonide-formoterol (Symbicort) 160-4.5 mcg/act inhaler, Inhale 2 puffs 2 (two) times a day Rinse mouth after use. Use additional puff if needed daily, Disp: 10.2 g, Rfl: 3    ferrous sulfate 324 (65 Fe) mg, Take 1 tablet (324 mg total) by mouth every other day, Disp: 30 tablet, Rfl: 3    fluticasone-salmeterol (Advair HFA) 45-21 MCG/ACT inhaler, Inhale 2 puffs 2 (two) times a day Rinse mouth after use. (Patient not taking: Reported on 3/8/2024), Disp: 12 g, Rfl: 5    gabapentin (NEURONTIN) 100 mg capsule, Take 1 capsule (100 mg total) by mouth 3 (three) times a day (Patient not taking: Reported on 3/8/2024), Disp: 30 capsule, Rfl: 2    indomethacin (INDOCIN) 25 mg capsule, TAKE 1 TO 2 CAPSULES BY MOUTH EVERY 8 HOURS AS NEEDED FOR SEVERE HEADACHE WITH FOOD (Patient not taking: Reported on 11/3/2023), Disp: 15 capsule, Rfl: 1    pantoprazole (PROTONIX) 40 mg tablet, TAKE 1 TABLET BY MOUTH EVERY DAY (Patient not taking: Reported on 11/3/2023), Disp: 90 tablet, Rfl: 3    semaglutide, 0.25 or 0.5 mg/dose, (Ozempic, 0.25 or 0.5 MG/DOSE,) 2 mg/3 mL injection pen, Inject 0.25 mg once a week for 1 month and then inject 0.5mg weekly, Disp: 3 mL, Rfl: 1    topiramate (TOPAMAX) 50 MG tablet, TAKE 1 AND 1/2 TABLETS (75MG) AT BEDTIME FOR 1 WEEK THEN TAKE 2 TABLETS (100MG) AT BEDTIME (Patient not taking: Reported on 1/25/2024), Disp: 60 tablet, Rfl: 5  "    She is taking iron 3 days per week due to inability to tolerate daily iron     Food Intake and Lifestyle Assessment   Food Intake Assessment completed via usual diet recall  Breakfast:  9/ 9:30 am Coffee or sometimes a nutrigrain bar or yogurt with granola   Cold coffee with cream and sugar from [employer] (20-24 ounces) - sips all day   Snack: 0   Lunch: 1 pm - packs and take out - trying to pack lunch more   Take out once per week - can be a variety of things with her co workers   Packs lunch 4 x per week   Will pack fruit , granola and yogurt , sandwich ( turkey and cheese ) bottle of water or juice and banana or strawberries   Snack: 0  Dinner: 6/6:30 pm   Rice/ chicken / seldom does pork/ always includes a vegetable ( steamer bags ) or salad   Snack: seldom   Beverage intake: water, juice and coffee/tea  Protein supplement: none   Estimated protein intake per day: ~50 grams per day   Estimated fluid intake per day: \" not enough\"  Drinks a little of water or juice or coffee  Seldomly finishes a bottle.  Likes crystal lite also   Meals eaten away from home: once to twice per week - once for lunch and once for dinner   Typical meal pattern: 2-3 meals per day and 0-1 snacks per day  Eating Behaviors: Mindless eating  Food allergies or intolerances: No Known Allergies   NKFA  Cultural or [place] considerations:      Lab Results   Component Value Date    IRON 46 (L) 01/02/2024    TIBC 427 01/02/2024    FERRITIN 11 01/02/2024     Lab Results   Component Value Date    WBC 8.68 01/02/2024    HGB 11.6 01/02/2024    HCT 39.4 01/02/2024    MCV 81 (L) 01/02/2024     01/02/2024     Lab Results   Component Value Date    HGBA1C 6.5 (H) 01/02/2024        Pt seen by hematology and completed IV iron infusions     Physical Assessment  Physical Activity  Types of exercise: Walking  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   Does all housework   Tracks her steps and meets her goal most day   Will set to 6000 per " "day - meeting 4500 per day   Current physical limitations: SOB- asthma, need inhaler, extreme headaches with pseudo tumor     Psychosocial Assessment   Support systems: mother   Socioeconomic factors: works 35 to 40 hours per week   Works 9 to 5 or 10 to 5 - 6 day work week   Sleep: 12 midnight and up at 6 am - sometimes on weekend will sleep to 7:30 am      Nutrition Diagnosis- continued   Diagnosis: Overweight / Obesity (NC-3.3)  Related to: Food and nutrition-related knowledge deficit, Physical inactivity and Excessive energy intake  As Evidenced by: BMI >25 and Unintentional weight gain     Nutrition Prescription: Recommend the following diet  1800 calories/ 90 grams of carb   Adjusted to lower as patient started Ozempic     Interventions and Teaching   Discussed pre-op and post-op nutrition guidelines.       Patient educated and handouts provided.  Surgical changes to stomach / GI  Capacity of post-surgery stomach  Diet progression  Adequate hydration  Sugar and fat restriction to decrease \"dumping syndrome\"  Fat restriction to decrease steatorrhea  Expected weight loss  Weight loss plateaus/ possibility of weight regain  Exercise  Suggestions for pre-op diet  Nutrition considerations after surgery  Protein supplements  Meal planning and preparation  Appropriate carbohydrate, protein, and fat intake, and food/fluid choices to maximize safe weight loss, nutrient intake, and tolerance   Dietary and lifestyle changes  Possible problems with poor eating habits  Intuitive eating  Techniques for self monitoring and keeping daily food journal  Potential for food intolerance after surgery, and ways to deal with them including: lactose intolerance, nausea, reflux, vomiting, diarrhea, food intolerance, appetite changes, gas  Vitamin / Mineral supplementation of Multivitamin with minerals, Calcium, Vitamin B12, Iron, Fat Soluble vitamins and Vitamin D    Reviewed protocol that diabetics will discontinue GLP-1 one week " prior to surgery date. Pt verbalized understanding     Patient is not currently pregnant and doesn't desire to become pregnant a minimum of one year post-op    Education provided to: patient    Barriers to learning: No barriers identified    Readiness to change: preparation    Prior research on procedure: discussed with provider and pre-op class    Comprehension: needs reinforcement and verbalizes understanding     Expected Compliance: good  Recommendations  Pt is an appropriate candidate for surgery. Yes    Workflow: (Incomplete in Bold):  Psych and/or D+A Clearance: n/a  Blood Work: needs updated   PCP letter: done  Surgeon Appt: done  EGD: done  Cardiac Risk Assessment with [id number]  Neurology consult : 2024  Hematology - done  Sleep Studies: N/A  Nicotine test: n/a  Pre-Operative Program:   NAFLD Score Calculated: yes   Hepatology consult: message sent to surgeon to order testing       Evaluation / Monitoring  Dietitian to Monitor: Eating pattern as discussed Tolerance of nutrition prescription Body weight Lab values Physical activity Bowel pattern  Pt has been drinking a shake for breakfast most  days per week and eating 2 meals. She is taking her vitamins and minerals as recommended. Pt is taking her vitamins and minerals as recommended and completed her IV iron infusions.  Pt has been drinking 64 ounces or more of fluid per day. Pt has been doing  exercise videos on line from a previous bariatric patient that she enjoys. She is doing the exercise videos three days per week for 10-30 minutes  She is more focused on strength training at this time. She is tracking her steps to 4500 per day and focused on increasing her daily steps . She was food logging but got out of the habit and is having a hard time getting back to it.  She has been focused on her portion sizes and eating off a smaller plate. She has been able to eliminate coffee and soda since starting Ozempic.   Overall making lifestyle  changes in preparation for surgery         Goals  Food journal, Complete lession plans 1-6, Eat 3 meals per day and Eliminate mindless snacking   Food log 1800  calories or less per day   Practice not drinking  before and during meals and after meals    Start 30/30 rule and progress to 30/60 rule   Time meals to take 15-20 minutes and chew food well     Time Spent:   30 Minutes

## 2024-04-07 ENCOUNTER — APPOINTMENT (OUTPATIENT)
Dept: LAB | Facility: CLINIC | Age: 39
End: 2024-04-07
Payer: COMMERCIAL

## 2024-04-07 DIAGNOSIS — G93.2 IDIOPATHIC INTRACRANIAL HYPERTENSION: ICD-10-CM

## 2024-04-07 DIAGNOSIS — E66.01 CLASS 3 SEVERE OBESITY WITH SERIOUS COMORBIDITY AND BODY MASS INDEX (BMI) OF 50.0 TO 59.9 IN ADULT (HCC): ICD-10-CM

## 2024-04-07 DIAGNOSIS — E11.9 TYPE 2 DIABETES MELLITUS WITHOUT COMPLICATION, WITHOUT LONG-TERM CURRENT USE OF INSULIN (HCC): ICD-10-CM

## 2024-04-07 DIAGNOSIS — E66.01 CLASS 3 SEVERE OBESITY DUE TO EXCESS CALORIES WITH SERIOUS COMORBIDITY AND BODY MASS INDEX (BMI) OF 50.0 TO 59.9 IN ADULT (HCC): ICD-10-CM

## 2024-04-07 DIAGNOSIS — G43.719 INTRACTABLE CHRONIC MIGRAINE WITHOUT AURA AND WITHOUT STATUS MIGRAINOSUS: ICD-10-CM

## 2024-04-07 DIAGNOSIS — Z01.818 PRE-OPERATIVE CLEARANCE: ICD-10-CM

## 2024-04-07 DIAGNOSIS — R06.02 SOB (SHORTNESS OF BREATH): ICD-10-CM

## 2024-04-07 DIAGNOSIS — J45.40 MODERATE PERSISTENT ASTHMA WITHOUT COMPLICATION: ICD-10-CM

## 2024-04-07 DIAGNOSIS — Z82.49 FAMILY HISTORY OF BRAIN ANEURYSM: ICD-10-CM

## 2024-04-07 DIAGNOSIS — R07.89 CHEST TIGHTNESS: ICD-10-CM

## 2024-04-07 LAB
ALBUMIN SERPL BCP-MCNC: 3.7 G/DL (ref 3.5–5)
ALP SERPL-CCNC: 84 U/L (ref 34–104)
ALT SERPL W P-5'-P-CCNC: 12 U/L (ref 7–52)
ANION GAP SERPL CALCULATED.3IONS-SCNC: 11 MMOL/L (ref 4–13)
AST SERPL W P-5'-P-CCNC: 14 U/L (ref 13–39)
BILIRUB SERPL-MCNC: 0.26 MG/DL (ref 0.2–1)
BUN SERPL-MCNC: 7 MG/DL (ref 5–25)
CALCIUM SERPL-MCNC: 8.7 MG/DL (ref 8.4–10.2)
CHLORIDE SERPL-SCNC: 102 MMOL/L (ref 96–108)
CO2 SERPL-SCNC: 26 MMOL/L (ref 21–32)
CREAT SERPL-MCNC: 0.59 MG/DL (ref 0.6–1.3)
ERYTHROCYTE [DISTWIDTH] IN BLOOD BY AUTOMATED COUNT: 18.4 % (ref 11.6–15.1)
EST. AVERAGE GLUCOSE BLD GHB EST-MCNC: 114 MG/DL
GFR SERPL CREATININE-BSD FRML MDRD: 116 ML/MIN/1.73SQ M
GLUCOSE P FAST SERPL-MCNC: 99 MG/DL (ref 65–99)
HBA1C MFR BLD: 5.6 %
HCT VFR BLD AUTO: 42.4 % (ref 34.8–46.1)
HGB BLD-MCNC: 13.4 G/DL (ref 11.5–15.4)
MCH RBC QN AUTO: 26.6 PG (ref 26.8–34.3)
MCHC RBC AUTO-ENTMCNC: 31.6 G/DL (ref 31.4–37.4)
MCV RBC AUTO: 84 FL (ref 82–98)
PLATELET # BLD AUTO: 330 THOUSANDS/UL (ref 149–390)
PMV BLD AUTO: 9.7 FL (ref 8.9–12.7)
POTASSIUM SERPL-SCNC: 4 MMOL/L (ref 3.5–5.3)
PROT SERPL-MCNC: 7.3 G/DL (ref 6.4–8.4)
RBC # BLD AUTO: 5.03 MILLION/UL (ref 3.81–5.12)
SODIUM SERPL-SCNC: 139 MMOL/L (ref 135–147)
TSH SERPL DL<=0.05 MIU/L-ACNC: 1.61 UIU/ML (ref 0.45–4.5)
WBC # BLD AUTO: 7.93 THOUSAND/UL (ref 4.31–10.16)

## 2024-04-07 PROCEDURE — 83036 HEMOGLOBIN GLYCOSYLATED A1C: CPT

## 2024-04-07 PROCEDURE — 84443 ASSAY THYROID STIM HORMONE: CPT

## 2024-04-07 PROCEDURE — 36415 COLL VENOUS BLD VENIPUNCTURE: CPT

## 2024-04-07 PROCEDURE — 85027 COMPLETE CBC AUTOMATED: CPT

## 2024-04-07 PROCEDURE — 80053 COMPREHEN METABOLIC PANEL: CPT

## 2024-04-17 ENCOUNTER — OFFICE VISIT (OUTPATIENT)
Dept: CARDIOLOGY CLINIC | Facility: CLINIC | Age: 39
End: 2024-04-17
Payer: COMMERCIAL

## 2024-04-17 VITALS
BODY MASS INDEX: 55.45 KG/M2 | OXYGEN SATURATION: 97 % | DIASTOLIC BLOOD PRESSURE: 82 MMHG | HEART RATE: 78 BPM | WEIGHT: 293 LBS | SYSTOLIC BLOOD PRESSURE: 112 MMHG

## 2024-04-17 DIAGNOSIS — F41.9 ANXIETY: ICD-10-CM

## 2024-04-17 DIAGNOSIS — E78.5 HYPERLIPIDEMIA, UNSPECIFIED HYPERLIPIDEMIA TYPE: ICD-10-CM

## 2024-04-17 DIAGNOSIS — Z01.810 PREOPERATIVE CARDIOVASCULAR EXAMINATION: Primary | ICD-10-CM

## 2024-04-17 DIAGNOSIS — J45.40 MODERATE PERSISTENT ASTHMA WITHOUT COMPLICATION: ICD-10-CM

## 2024-04-17 DIAGNOSIS — E11.9 TYPE 2 DIABETES MELLITUS WITHOUT COMPLICATION, WITHOUT LONG-TERM CURRENT USE OF INSULIN (HCC): ICD-10-CM

## 2024-04-17 DIAGNOSIS — G93.2 IDIOPATHIC INTRACRANIAL HYPERTENSION: ICD-10-CM

## 2024-04-17 DIAGNOSIS — E66.01 CLASS 3 SEVERE OBESITY DUE TO EXCESS CALORIES WITH SERIOUS COMORBIDITY AND BODY MASS INDEX (BMI) OF 50.0 TO 59.9 IN ADULT (HCC): ICD-10-CM

## 2024-04-17 DIAGNOSIS — F32.A DEPRESSION, UNSPECIFIED DEPRESSION TYPE: ICD-10-CM

## 2024-04-17 PROCEDURE — 93000 ELECTROCARDIOGRAM COMPLETE: CPT | Performed by: INTERNAL MEDICINE

## 2024-04-17 PROCEDURE — 99204 OFFICE O/P NEW MOD 45 MIN: CPT | Performed by: INTERNAL MEDICINE

## 2024-04-17 NOTE — LETTER
Cardiology Pre Operative Clearance      PRE OPERATIVE CARDIAC RISK ASSESSMENT    05/09/24    Cindy Bolden  1985  [id number]    Date of Surgery: To Be Determined    Type of Surgery: Bariatric    Surgeon: Renzo Esqueda MD    No Cardiac Contraindication for Planned Surgical Procedures    Anticoagulation: not applicable    Physician Comment:     Mrs. Bolden may proceed with her planned surgical intervention without further cardiac evaluation.  She has been deemed a low risk for perioperative MACE with a a preoperative Méndez analysis that yielded a 0.1% risk of myocardial infarction or cardiac arrest, intraoperatively or up to 30 days post-op.    Electronically Signed: Michelle Byrd DO

## 2024-04-17 NOTE — PROGRESS NOTES
Cardiology Consultation Visit       Cindy Bolden   [id number]  1985      [facility] VASCULAR [facility] [place]  [address] [place] [address]  [facility] [id number]      Consult Requested by:   Self, Referral  PCP: Galina Wu DO    Reason for Consultation / Principal Problem:  Mrs. Cindy Bolden is a 38 y.o. female and never smoker with a PMH significant for but not limited to HLD, DMII, Asthma, Idiopathic Intracranial Hypertension and Obesity.  She presents to clinic for Preoperative Risk Stratification.    History of Present Illness:  Mrs. Wood was at her baseline state of health: independent of adl's,  working a desk job , and not exercising regularly, when she was seen by Weight Management in Oct 2023 .  She is now beginning her Bariatric Surgery workup with intervention planned for some time this year.  She currently denies any cp, diaphoresis, n/v, sob, de paz, palpitations, near syncope, syncope, orthopnea, pnd, or ble edema.  She denies any recent hospitalizations beyond her ED visit in April in , prior cardiac history, family history of early cad, or family history of scd.  She notes that her Mother, Age 67, takes NTG but is uncertain of her cardiac history.  Her Father however  of a Cardiac Arrest at age 33 secondary to IVDA.  She also notes that her paternal Aunt had a brain aneurysm though details are unclear.      She notes less than ideal control of her diet and exercise habits. She reports a diet that is not well balanced, no consistent effort to monitor salt intake, water intake of 64 oz/day, no caffeine intake, rare alcohol intake, and improved efforts toward exercise with strength training 30 mins for 3/week . She is otherwise compliant with medications but does not maintain a detailed BP log.  Of note, the patient's cardiac risk factor(s) include: dyslipidemia, diabetes mellitus, metabolic syndrome, obesity, and sedentary lifestyle.    Assessment & Plan    Preoperative Cardiac Risk Stratification for Bariatric Surgery planned for later in 2024  Functionally, the patient is able to perform adls and can achieve at least 4 METs without symptoms  Clinical exam is unremarkable for acute cv instability or illness, Review of the patient's prior cardiac testing shows no new findings requiring further workup, Recent EKG on 04/17/24 shows no pathological Q waves or new BBB   The patient's Méndez preoperative analysis yields a 0.1% risk of myocardial infarction or cardiac arrest, intraoperatively or up to 30 days post-op  At this time, the patient is deemed a low risk and may proceed to surgery without further cardiac testing     HLD, recent FLP:  /  / HDL 31 /  on 04/07/24  No prior statin therapy or medical management, diet controlled  Check FLP prior to next visit, cont counseling on diet and lifestyle modification  Monitor FLP as noted above, will reassess on next visit    Idiopathic Intracranial Hypertension  Stable per patient but with notable HA/Migraine history  Cont current medication regimen, Cont follow up with neuro/opthalmalogy, Cont weight mgmt efforts  Cont routine monitor and close attention to symptomatic changes    DM Type II, most recent A1c 5.6 (down from 6.5) on 04/07/24  Tolerating medical mgmt without progressive symptoms  Check A1c per PCP, Cont current medication regimen  Monitor labs as noted above, will reassess on next visit    Obesity, Body mass index is 55.45 kg/m².  Weight has been stable overall: ±5 lbs over the last year   Cont to review the importance of diet and lifestyle modification, cont ongoing workup and treatment by [facility]  Will monitor routinely at this time    Discussion / Summary:  Precautions and reasons to call our office or proceed to ER were discussed in detail. Patient expressed understanding and questions were answered. Plan on follow up in-clinic in 12 months.    Office Visit Diagnosis:  1.  Preoperative cardiovascular examination        2. Idiopathic intracranial hypertension  POCT ECG      3. Hyperlipidemia, unspecified hyperlipidemia type  Lipid Panel with Direct LDL reflex      4. Type 2 diabetes mellitus without complication, without long-term current use of insulin ([facility])  POCT ECG      5. Moderate persistent asthma without complication  POCT ECG      6. Anxiety  POCT ECG      7. Depression, unspecified depression type  POCT ECG      8. Class 3 severe obesity due to excess calories with serious comorbidity and body mass index (BMI) of 50.0 to 59.9 in adult ([facility])  POCT ECG          Subjective   Review of Systems   Constitutional: Negative for chills and fever.   HENT:  Negative for congestion and sore throat.    Eyes:  Negative for blurred vision and pain.   Cardiovascular:  Positive for leg swelling. Negative for chest pain, claudication, dyspnea on exertion, near-syncope, palpitations, paroxysmal nocturnal dyspnea and syncope.   Respiratory:  Positive for snoring. Negative for cough, shortness of breath, sleep disturbances due to breathing and wheezing (well controlled with sympticort).    Hematologic/Lymphatic: Negative for bleeding problem. Does not bruise/bleed easily.   Skin:  Negative for itching and rash.   Gastrointestinal:  Negative for abdominal pain, constipation, diarrhea, nausea and vomiting.   Genitourinary:  Negative for dysuria and hematuria.   Neurological:  Positive for numbness (in bilateral shoulders) and paresthesias (in shoulder, improves with gabapentin).   Psychiatric/Behavioral:  Negative for memory loss. The patient is not nervous/anxious.      The following portions of the patient's history were reviewed and updated as appropriate: past medical history, past social history, past family history, past surgical history, current medications, allergies, past surgical history and problem list.  Past Medical History:   Diagnosis Date   • Asthma      Social History      Socioeconomic History   • Marital status: /Civil Union     Spouse name: Not on file   • Number of children: Not on file   • Years of education: Not on file   • Highest education level: Not on file   Occupational History   • Not on file   Tobacco Use   • Smoking status: Never   • Smokeless tobacco: Never   Vaping Use   • Vaping status: Never Used   Substance and Sexual Activity   • Alcohol use: No   • Drug use: No   • Sexual activity: Yes   Other Topics Concern   • Not on file   Social History Narrative    Social problem      Social Determinants of Health     Financial Resource Strain: Low Risk  (1/3/2024)    Overall Financial Resource Strain (CARDIA)    • Difficulty of Paying Living Expenses: Not hard at all   Food Insecurity: No Food Insecurity (1/3/2024)    Hunger Vital Sign    • Worried About Running Out of Food in the Last Year: Never true    • Ran Out of Food in the Last Year: Never true   Transportation Needs: No Transportation Needs (1/3/2024)    PRAPARE - Transportation    • Lack of Transportation (Medical): No    • Lack of Transportation (Non-Medical): No   Physical Activity: Inactive (9/29/2021)    Exercise Vital Sign    • Days of Exercise per Week: 0 days    • Minutes of Exercise per Session: 0 min   Stress: Stress Concern Present (9/29/2021)    [employer] - Occupational Stress Questionnaire    • Feeling of Stress : To some extent   Social Connections: Moderately Isolated (9/29/2021)    Social Connection and Isolation Panel [NHANES]    • Frequency of Communication with Friends and Family: More than three times a week    • Frequency of Social Gatherings with Friends and Family: More than three times a week    • Attends [employer] Services: Never    • Active Member of Clubs or Organizations: No    • Attends Club or Organization Meetings: Never    • Marital Status:    Intimate Partner Violence: Not At Risk (9/29/2021)    Humiliation, Afraid, Rape, and Kick  questionnaire    • Fear of Current or Ex-Partner: No    • Emotionally Abused: No    • Physically Abused: No    • Sexually Abused: No   Housing Stability: Low Risk  (2023)    Housing Stability Vital Sign    • Unable to Pay for Housing in the Last Year: No    • Number of Places Lived in the Last Year: 1    • Unstable Housing in the Last Year: No     Family History   Problem Relation Age of Onset   • Arthritis Mother    • Fibromyalgia Mother    • Rheum arthritis Mother    • No Known Problems Sister    • No Known Problems Daughter    • No Known Problems Daughter    • No Known Problems Daughter    • No Known Problems Daughter    • Breast cancer Maternal Grandmother    • No Known Problems Other    • Diabetes Family    • Cancer Family    • Heart disease Family    • Breast cancer Maternal Aunt      Past Surgical History:   Procedure Laterality Date   •  SECTION     • FL LUMBAR PUNCTURE DIAGNOSTIC  2022   • TUBAL LIGATION         Current Outpatient Medications:   •  acetaZOLAMIDE (DIAMOX) 250 mg tablet, TAKE 2 TABLETS BY MOUTH EVERY MORNING, Disp: 60 tablet, Rfl: 5  •  budesonide-formoterol (Symbicort) 160-4.5 mcg/act inhaler, Inhale 2 puffs 2 (two) times a day Rinse mouth after use. Use additional puff if needed daily, Disp: 10.2 g, Rfl: 3  •  ferrous sulfate 324 (65 Fe) mg, Take 1 tablet (324 mg total) by mouth every other day, Disp: 30 tablet, Rfl: 3  •  semaglutide, 0.25 or 0.5 mg/dose, (Ozempic, 0.25 or 0.5 MG/DOSE,) 2 mg/3 mL injection pen, Inject 0.25 mg once a week for 1 month and then inject 0.5mg weekly, Disp: 3 mL, Rfl: 1  •  fluticasone-salmeterol (Advair HFA) 45-21 MCG/ACT inhaler, Inhale 2 puffs 2 (two) times a day Rinse mouth after use. (Patient not taking: Reported on 3/8/2024), Disp: 12 g, Rfl: 5  •  gabapentin (NEURONTIN) 100 mg capsule, Take 1 capsule (100 mg total) by mouth 3 (three) times a day, Disp: 30 capsule, Rfl: 2  •  indomethacin (INDOCIN) 25 mg capsule, TAKE 1 TO 2 CAPSULES BY  MOUTH EVERY 8 HOURS AS NEEDED FOR SEVERE HEADACHE WITH FOOD (Patient not taking: Reported on 11/3/2023), Disp: 15 capsule, Rfl: 1  •  pantoprazole (PROTONIX) 40 mg tablet, TAKE 1 TABLET BY MOUTH EVERY DAY, Disp: 90 tablet, Rfl: 3  •  topiramate (TOPAMAX) 50 MG tablet, TAKE 1 AND 1/2 TABLETS (75MG) AT BEDTIME FOR 1 WEEK THEN TAKE 2 TABLETS (100MG) AT BEDTIME (Patient not taking: Reported on 1/25/2024), Disp: 60 tablet, Rfl: 5  No Known Allergies  Patient Active Problem List   Diagnosis   • SOB (shortness of breath)   • Acute asthma exacerbation   • Class 3 severe obesity with serious comorbidity and body mass index (BMI) of 50.0 to 59.9 in adult (HCC)   • Chest tightness   • Type 2 diabetes mellitus without complication, without long-term current use of insulin (HCC)   • Moderate persistent asthma without complication   • COVID-19   • Odynophagia   • Idiopathic intracranial hypertension   • Right flank pain   • Intractable chronic migraine without aura and without status migrainosus   • Family history of brain aneurysm   • Chronic intractable headache   • Annual physical exam   • Iron deficiency anemia   • Anxiety   • Depression   • Bilateral carpal tunnel syndrome   • Abnormal brain MRI       Objective   Current Vitals:  Vitals:    04/17/24 0845   BP: 112/82   BP Location: Left arm   Patient Position: Sitting   Cuff Size: Large   Pulse: 78   SpO2: 97%   Weight: (!) 142 kg (313 lb)     Body mass index is 55.45 kg/m².  ?  Recent Vitals:  BP Readings from Last 3 Encounters:   04/24/24 (!) 167/105   04/17/24 112/82   03/08/24 126/82     Pulse Readings from Last 3 Encounters:   04/24/24 85   04/17/24 78   03/08/24 72     SpO2 Readings from Last 3 Encounters:   04/24/24 98%   04/17/24 97%   02/01/24 96%     Temp Readings from Last 3 Encounters:   04/24/24 98.5 °F (36.9 °C)   02/29/24 (!) 96.9 °F (36.1 °C) (Temporal)   02/21/24 98.7 °F (37.1 °C) (Temporal)     Wt Readings from Last 3 Encounters:   04/17/24 (!) 142 kg (313  lb)   03/28/24 (!) 141 kg (311 lb 9.6 oz)   03/14/24 (!) 141 kg (310 lb)     BMI Readings from Last 3 Encounters:   04/17/24 55.45 kg/m²   03/28/24 55.20 kg/m²   03/14/24 54.91 kg/m²     Physical Exam:  Physical Exam  Constitutional:       General: She is not in acute distress.     Appearance: Normal appearance. She is obese. She is not toxic-appearing.   HENT:      Head: Normocephalic and atraumatic.      Right Ear: External ear normal.      Left Ear: External ear normal.      Nose: Nose normal.   Eyes:      General:         Right eye: No discharge.         Left eye: No discharge.   Neck:      Vascular: No carotid bruit.   Cardiovascular:      Rate and Rhythm: Normal rate and regular rhythm.      Pulses: Normal pulses.      Heart sounds: No murmur heard.     No gallop.   Pulmonary:      Effort: Pulmonary effort is normal. No respiratory distress.      Breath sounds: No wheezing or rales.   Musculoskeletal:      Cervical back: Neck supple.      Right lower leg: No edema.      Left lower leg: No edema.   Skin:     General: Skin is warm and dry.      Capillary Refill: Capillary refill takes less than 2 seconds.   Neurological:      Mental Status: She is alert and oriented to person, place, and time. Mental status is at baseline.   Psychiatric:         Mood and Affect: Mood normal.         Behavior: Behavior normal.         Labs:  Lab Results   Component Value Date     05/14/2015     09/08/2014    K 3.9 04/24/2024    K 4.0 04/07/2024    K 3.7 01/02/2024    K 3.6 05/14/2015    K 3.4 (L) 09/08/2014     04/24/2024     04/07/2024     01/02/2024     05/14/2015     09/08/2014    CO2 26 04/24/2024    CO2 26 04/07/2024    CO2 28 01/02/2024    CO2 27 05/14/2015    CO2 27 09/08/2014    BUN 7 04/24/2024    BUN 7 04/07/2024    BUN 10 01/02/2024    BUN 5 05/14/2015    BUN 9 09/08/2014    CREATININE 0.67 04/24/2024    CREATININE 0.59 (L) 04/07/2024    CREATININE 0.70 01/02/2024    CREATININE  0.58 05/14/2015    CREATININE 0.60 09/08/2014    EGFR 111 04/24/2024    EGFR 116 04/07/2024    EGFR 110 01/02/2024    GLUC 103 04/24/2024    GLUC 88 01/02/2024    GLUC 108 02/21/2022    AST 9 (L) 04/24/2024    AST 14 04/07/2024    AST 14 01/02/2024    ALT 13 04/24/2024    ALT 12 04/07/2024    ALT 13 01/02/2024    TBILI 0.30 04/24/2024    TBILI 0.26 04/07/2024    TBILI 0.23 01/02/2024    ALB 3.7 04/24/2024    ALB 3.7 04/07/2024    ALB 3.8 01/02/2024    MG 2.0 07/15/2021    CALCIUM 8.7 04/24/2024    CALCIUM 8.7 04/07/2024    CALCIUM 8.9 01/02/2024    CALCIUM 8.4 05/14/2015    CALCIUM 8.4 09/08/2014      Lab Results   Component Value Date    WBC 12.13 (H) 04/24/2024    WBC 7.93 04/07/2024    WBC 8.68 01/02/2024    WBC 6.73 05/14/2015    WBC 9.64 09/08/2014    HGB 13.9 04/24/2024    HGB 13.4 04/07/2024    HGB 11.6 01/02/2024    HGB 12.3 05/14/2015    HGB 11.2 (L) 09/08/2014    HCT 44.4 04/24/2024    HCT 42.4 04/07/2024    HCT 39.4 01/02/2024    HCT 38.6 05/14/2015    HCT 35.5 09/08/2014     04/24/2024     04/07/2024     01/02/2024     05/14/2015     09/08/2014    INR 1.00 04/19/2022    INR 0.93 07/15/2021    INR 0.97 07/12/2021    IRON 46 (L) 01/02/2024    IRON 26 (L) 03/25/2021    FERRITIN 11 01/02/2024    FERRITIN 24 03/25/2021    TIBC 427 01/02/2024    TIBC 379 03/25/2021      Lab Results   Component Value Date    CHOLESTEROL 174 04/07/2024    TRIG 153 (H) 04/07/2024    HDL 31 (L) 04/07/2024    LDLCALC 112 (H) 04/07/2024     Lab Results   Component Value Date    HGBA1C 5.6 04/07/2024    HGBA1C 6.5 (H) 01/02/2024    HGBA1C 5.9 10/23/2017    GLUF 99 04/07/2024    POCGLU 99 07/15/2021     Lab Results   Component Value Date    DGT7YEBBDQER 1.612 04/07/2024    TXA5AQMSNEXI 2.264 01/02/2024    PXV8HCFINBLU 2.150 10/12/2020     Lab Results   Component Value Date    HSTNI0 <2 02/21/2022    TROPONINI <0.02 06/26/2020     Lab Results   Component Value Date    NTBNP 69 06/16/2016         Imaging:  I have personally reviewed pertinent reports.  CT abdomen pelvis with contrast  Result Date: 4/24/2024  Impression: 1.  No acute inflammatory process in the abdomen or pelvis. 2.  Simple appearing right ovarian cyst which does not require routine follow-up in a premenopausal patient.    Imaging:  I have personally reviewed pertinent reports.    CT abdomen pelvis with contrast  Result Date: 4/24/2024  Impression: 1.  No acute inflammatory process in the abdomen or pelvis. 2.  Simple appearing right ovarian cyst which does not require routine follow-up in a premenopausal patient.    Cardiac Studies:  EKG   Date: 04/24/24, normal sinus rhythm  Date: 04/17/24, normal sinus rhythm  Date: 10/09/23, normal sinus rhythm  Date: 12/12/22, normal sinus rhythm  Date: 06/16/22, normal sinus rhythm  Date: 02/21/22, normal sinus rhythm  Date: 07/15/21, normal sinus rhythm  Date: 07/12/21, normal sinus rhythm, possible jacey  Date: 12/01/20, normal sinus rhythm  Date: 06/26/20, normal sinus rhythm  Date: 06/22/16, normal sinus rhythm  Date: 06/16/16, normal sinus rhythm  Date: 09/08/14, normal sinus rhythm  Date: 07/16/13, normal sinus rhythm, left axis deviation  Date: 09/17/07, normal sinus rhythm      TELE   No results found for this or any recent visit.     HOLTER   No results found for this or any previous visit.    DEVICE   No results found for this or any previous visit.    EP   No results found for this or any previous visit.    CT   Date: 07/13/21, CTA Chest PE Study without notable coronary calcification  Date: 06/26/20, CTA Chest PE Study without notable coronary calcification    CATH   No results found for this or any previous visit.    STRESS   No results found for this or any previous visit.     TTE   No results found for this or any previous visit.    THIAGO   No results found for this or any previous visit.    CMR   No results found for this or any previous visit.      Counseling / Coordination of  Care:  During our visit, I spent 40 minutes with the patient, and greater than 50% of this time was spent on counseling and coordination of care, including addressing diagnostic results, prognosis, risks and benefits of treatment options, instructions for management, patient/family education, importance of treatment compliance, along with risk factor reductions.    Dictation Disclaimer:  This note was completed in part utilizing [employer] direct voice recognition software. Grammatical errors, random word insertion, spelling mistakes, and incomplete sentences may be an occasional consequence of the system secondary to software limitations, ambient noise and hardware issues. At the time of dictation, efforts were made to edit, clarify and /or correct errors.  Please read the chart carefully and recognize, using context, where substitutions have occurred.  If you have any questions or concerns about the context, text or information contained within the body of this dictation, please contact myself, the provider, for further clarification.     Michelle Byrd, DO 05/09/24

## 2024-04-18 ENCOUNTER — PATIENT MESSAGE (OUTPATIENT)
Dept: NEUROLOGY | Facility: CLINIC | Age: 39
End: 2024-04-18

## 2024-04-18 ENCOUNTER — TELEMEDICINE (OUTPATIENT)
Dept: NEUROLOGY | Facility: CLINIC | Age: 39
End: 2024-04-18
Payer: COMMERCIAL

## 2024-04-18 DIAGNOSIS — G93.2 IDIOPATHIC INTRACRANIAL HYPERTENSION: Primary | ICD-10-CM

## 2024-04-18 DIAGNOSIS — Z82.49 FAMILY HISTORY OF BRAIN ANEURYSM: ICD-10-CM

## 2024-04-18 DIAGNOSIS — Z01.818 PRE-OPERATIVE CLEARANCE: ICD-10-CM

## 2024-04-18 DIAGNOSIS — R90.89 ABNORMAL BRAIN MRI: ICD-10-CM

## 2024-04-18 DIAGNOSIS — E66.01 CLASS 3 SEVERE OBESITY WITH SERIOUS COMORBIDITY AND BODY MASS INDEX (BMI) OF 50.0 TO 59.9 IN ADULT (HCC): ICD-10-CM

## 2024-04-18 PROCEDURE — 99213 OFFICE O/P EST LOW 20 MIN: CPT | Performed by: PHYSICIAN ASSISTANT

## 2024-04-18 NOTE — PROGRESS NOTES
Virtual Regular Visit    Verification of patient location:    Patient is located at Other in the following state in which I hold an active license [place]      Assessment/Plan:    Problem List Items Addressed This Visit          Cardiovascular and Mediastinum    Family history of brain aneurysm       Nervous and Auditory    Idiopathic intracranial hypertension - Primary    Relevant Orders    Ambulatory Referral to Ophthalmology       Neurology/Sleep    Abnormal brain MRI       Other    Class 3 severe obesity with serious comorbidity and body mass index (BMI) of 50.0 to 59.9 in adult (HCC)     Other Visit Diagnoses       Pre-operative clearance              Ms. Cindy Bolden seems to be doing well from a neurological perspective.    The patient has been migraine free and feeling a lot better.  She attributes it to some weight loss, and staying active.  She is planning bariatric surgery and continues Ozempic injection.  I do agree that weight loss will prevent future migraine headaches and likely will reduce the increased intracranial pressure.  She still needs to have a dilated eye exam to rule out papilledema, although this is not likely since her headaches are better and there are no vision concerns by the patient.  The eye exam has never been formally done by an ophthalmologist so I recommended this.  --Ophthalmology referral placed in a separate encounter.    Okay to continue Diamox 500 mg in the morning until she gets her ophthalmology exam.  If no papilledema she can wean off of it by decreasing to 250 mg every morning for 1 week and then stop.  If headaches get worse or any other symptoms consistent with increased intracranial pressure, she should resume the dose and let me know.    She is cleared for her bariatric surgery from a neurologic perspective.  I messaged the patient for clarification on her family history of brain aneurysms, which we discussed in the past.  Imaging was ordered for this but not  done, possibly due to insurance issues.  I will clarify this history with the patient and pending her response, we may need to get this done prior to her surgery.    The patient should not hesitate to call me prior to her follow up with any questions or concerns.           Reason for visit is   Chief Complaint   Patient presents with    Virtual Regular Visit          Encounter provider Sandra Forman PA-C    Provider located at [facility]  NEUROLOGY [address]  [facility] [address] [phone]      Recent Visits  No visits were found meeting these conditions.  Showing recent visits within past 7 days and meeting all other requirements  Today's Visits  Date Type Provider Dept   04/18/24 Telemedicine Sandra Forman PA-C [facility]   Showing today's visits and meeting all other requirements  Future Appointments  No visits were found meeting these conditions.  Showing future appointments within next 150 days and meeting all other requirements       The patient was identified by name and date of birth. Cindy Bolden was informed that this is a telemedicine visit and that the visit is being conducted through the Epic Embedded platform. She agrees to proceed..  My office door was closed. No one else was in the room.  She acknowledged consent and understanding of privacy and security of the video platform. The patient has agreed to participate and understands they can discontinue the visit at any time.    Patient is aware this is a billable service.     Subjective  Cindy Bolden is a 38 y.o. female who is contacted via telemedicine for neurological follow-up.      HPI   No crazy headaches, did start Ozempic and had a headache for 3 days, they did wear off. She is in the process of getting the gastric sleeve and needs clearance for her surgery.    Since I last saw her in 2022 she has been feeling better.  She has not had severe headaches  "or migraines, except for 3 days in a row right after starting Ozempic injection for weight loss.  She has been on the injection for 5 weeks now.  After 3 days the headaches stopped and she feels better.  She has not had recurrent headaches since she got used to the new med.  She is in the process of getting approved for the gastric sleeve surgery and needs clearance.  She denies any vision issues such as double vision, no blurry vision or eye pain.  She denies headaches with cough or sneeze or bending over.  She denies any new neurologic problems at this time and no focal deficits.    On questioning she stopped Topamax a while ago because she thought it may be causing right sided stomach/abdominal pain.  The pain did go away when she stopped it.  She also stopped Diamox for a while but her PCP put her back on it for \"water weight.\"  She states it did help a little bit with this.  She takes 500 mg every morning and she does not have abdominal pain with it.    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    Prior notes:  CTA head with and without contrast was ordered at the last visit but denied by insurance.  Was ordered for a family history of brain aneurysms.  Will reconsider ordering this in the future.       The patient had a lumbar puncture 4/19/2022 with an opening pressure of 26, closing pressure 12.  She reports reduction of migraine headaches after this procedure.  Initially however she had headaches for 3 days following the procedure which was likely a low-pressure headache.  She now feels her headaches are more manageable and tolerable.  She can function better at work.  She is not missing any events because of her headaches.  She denies vision changes.  Work and stress seem to be the biggest triggers for her.  However she is not overusing Advil.  She tolerates the headaches without taking anything.     She does describe some hair loss but " "it is not significant.       She is interested in trying to lose weight in the future.     Migraines:  Current pain: 0/10  Reaches pain level at worst: 10/10  Frequency:  As of 4/18/24: noted above  As of 7/8/22: Daily low grade headache, less severe and tolerable with the current regimen; she is happier and able to function better  Location:  Occipital, R>L, sometimes R parietal, eyes  Quality:  Pressure, sharp  Associated with: nausea, photophobia, phonophobia, prefer quiet dark room, dizziness, blurry vision, dots in the visual fields, neck pain, sore/ stiff neck     Triggers: stress, especially at work, weather changes/range, using the computer too much     Aura/ warning:  She gets some blurry vision and dots in her visual fields as noted above, but no scintillating scotomas.  The headache just comes on or worsens without warning.     Medications tried:  Prevention-  Diamox- second time she tried it she did not have s/e  topamax- if takes this daily it brings the headache down to a tolerable level  Flexeril  Riboflavin     Abortive-  Tylenol  Naproxen  advil     Other non-medication therapies or treatments-     Prior note 4/8/22:  Migraine headaches which have been worsening since COVID infection in November 2020.     She holds additional diagnoses of right flank pain but no radiographic evidence of kidney stones, pre diabetes, mild intermittent asthma without complication.     I see mention of headaches by her PCP at the visit on 10/12/2020, and the patient was given Flexeril and riboflavin which did reduce the headaches thankfully.     She also states that she gained approximately 60 lb over the last several months and she is determined to lose weight.  Per PCP's note in November 2020 the \"patient has experienced an 80.8 lb weight gain over the course of 8 months.\"     She then tested + for COVID 11/24/2020. Headaches worsened since then and did not improve.  The patient describes her COVID infection as a " "constant fever for 2 weeks, shortness of breath, difficulty breathing, went to the hospital once for it.  She also had chills, aches and pains of the muscles and joints, as well as worsening migraine headaches.  The other infectious symptoms improved but the headaches persisted.     She is an  for bread [employer].  There is a lot of stress and a lot of changes going on with her work, so this seems like it could be a trigger for her migraines.     Family hx of migraines: unsure  Family hx of cerebral aneurysms: strong faimly history of brain aneurysm including paternal aunt who had an aneurysm rupture; mom's side and maternal GGM (on both sides) as well with brain aneurysms     Diagnostics:  Brain MRI w/o contrast 3/10/21: \"Prominent CSF within the optic nerve sheaths identified with prominent partially of the sella turcica.  In the setting of headache, consideration for idiopathic intracranial hypertension (pseudotumor cerebri) should be considered. Diffusely hypointense marrow signal in the T1 sequence identified.  This is nonspecific however can be seen in the setting of anemia, tobacco abuse, or other marrow infiltrating processes.\"      Past Medical History:   Diagnosis Date    Asthma        Past Surgical History:   Procedure Laterality Date     SECTION      FL LUMBAR PUNCTURE DIAGNOSTIC  2022    TUBAL LIGATION         Current Outpatient Medications   Medication Sig Dispense Refill    acetaZOLAMIDE (DIAMOX) 250 mg tablet TAKE 2 TABLETS BY MOUTH EVERY MORNING 60 tablet 5    budesonide-formoterol (Symbicort) 160-4.5 mcg/act inhaler Inhale 2 puffs 2 (two) times a day Rinse mouth after use. Use additional puff if needed daily 10.2 g 3    ferrous sulfate 324 (65 Fe) mg Take 1 tablet (324 mg total) by mouth every other day 30 tablet 3    gabapentin (NEURONTIN) 100 mg capsule Take 1 capsule (100 mg total) by mouth 3 (three) times a day 30 capsule 2    pantoprazole (PROTONIX) 40 mg tablet " TAKE 1 TABLET BY MOUTH EVERY DAY 90 tablet 3    semaglutide, 0.25 or 0.5 mg/dose, (Ozempic, 0.25 or 0.5 MG/DOSE,) 2 mg/3 mL injection pen Inject 0.25 mg once a week for 1 month and then inject 0.5mg weekly 3 mL 1    fluticasone-salmeterol (Advair HFA) 45-21 MCG/ACT inhaler Inhale 2 puffs 2 (two) times a day Rinse mouth after use. (Patient not taking: Reported on 3/8/2024) 12 g 5    indomethacin (INDOCIN) 25 mg capsule TAKE 1 TO 2 CAPSULES BY MOUTH EVERY 8 HOURS AS NEEDED FOR SEVERE HEADACHE WITH FOOD (Patient not taking: Reported on 11/3/2023) 15 capsule 1    topiramate (TOPAMAX) 50 MG tablet TAKE 1 AND 1/2 TABLETS (75MG) AT BEDTIME FOR 1 WEEK THEN TAKE 2 TABLETS (100MG) AT BEDTIME (Patient not taking: Reported on 1/25/2024) 60 tablet 5     No current facility-administered medications for this visit.        No Known Allergies    Review of Systems   Constitutional:  Negative for appetite change, fatigue and fever.   HENT: Negative.  Negative for hearing loss, tinnitus, trouble swallowing and voice change.    Eyes: Negative.  Negative for photophobia, pain and visual disturbance.   Respiratory: Negative.  Negative for shortness of breath.    Cardiovascular: Negative.  Negative for palpitations.   Gastrointestinal: Negative.  Negative for nausea and vomiting.   Endocrine: Negative.  Negative for cold intolerance.   Genitourinary: Negative.  Negative for dysuria, frequency and urgency.   Musculoskeletal:  Negative for back pain, gait problem, myalgias, neck pain and neck stiffness.   Skin: Negative.  Negative for rash.   Allergic/Immunologic: Negative.    Neurological:  Positive for headaches. Negative for dizziness, tremors, seizures, syncope, facial asymmetry, speech difficulty, weakness, light-headedness and numbness.   Hematological: Negative.  Does not bruise/bleed easily.   Psychiatric/Behavioral: Negative.  Negative for confusion, hallucinations and sleep disturbance.      ROS reviewed.    Video Exam    There  were no vitals filed for this visit.    Physical Exam   Neurological exam:  On neurologic exam, the patient is alert and oriented to time and place. Speech is fluent and articulate, and the patient follows commands appropriately. Judgment and affect appear normal.  Extraocular muscles are intact without nystagmus. Face is symmetric. Hearing is intact bilaterally. Motor examination reveals adequate range of motion.    Visit Time  Total Visit Duration: 13 minutes

## 2024-04-23 NOTE — PROGRESS NOTES
Behavioral Health Follow Up Note                                                                                      6/6 weight check :   Dr Zhao  (sleeve)    Starting weight 317.5  Today's weight  306.8    Surgery month:  May-June  Surgery deadline: Sept    Stated the six months of change was difficult at first.  Some identified frustration but stated it has been an amazing journey.  Feels the changes she has made are sustainable. Reducing sweets was not a challenge.   30/60 rule is a challenge.   12 yr old twins are aware of her decision.   She stated they keep her accountable.  reported to now supportive of her weight loss goals.  He attended one of her weight checks.   Will contact provider regarding weaning. Off Ozempic.    Workflow appears to be complete.   Discussed potential barriers to success and pt understands post surgery follow up is available with RD and SW.  Support groups are available for additional support.   Provided flyer for 5K .

## 2024-04-24 ENCOUNTER — HOSPITAL ENCOUNTER (EMERGENCY)
Facility: HOSPITAL | Age: 39
Discharge: HOME | End: 2024-04-24
Attending: EMERGENCY MEDICINE
Payer: COMMERCIAL

## 2024-04-24 ENCOUNTER — NURSE TRIAGE (OUTPATIENT)
Age: 39
End: 2024-04-24

## 2024-04-24 ENCOUNTER — APPOINTMENT (EMERGENCY)
Dept: RADIOLOGY | Facility: HOSPITAL | Age: 39
End: 2024-04-24
Payer: COMMERCIAL

## 2024-04-24 VITALS
RESPIRATION RATE: 16 BRPM | OXYGEN SATURATION: 98 % | HEART RATE: 85 BPM | TEMPERATURE: 98.5 F | DIASTOLIC BLOOD PRESSURE: 105 MMHG | SYSTOLIC BLOOD PRESSURE: 167 MMHG

## 2024-04-24 DIAGNOSIS — T88.7XXA MEDICATION SIDE EFFECT: ICD-10-CM

## 2024-04-24 DIAGNOSIS — R11.2 NAUSEA VOMITING AND DIARRHEA: Primary | ICD-10-CM

## 2024-04-24 DIAGNOSIS — R19.7 NAUSEA VOMITING AND DIARRHEA: Primary | ICD-10-CM

## 2024-04-24 LAB
ALBUMIN SERPL BCP-MCNC: 3.7 G/DL (ref 3.5–5)
ALP SERPL-CCNC: 79 U/L (ref 34–104)
ALT SERPL W P-5'-P-CCNC: 13 U/L (ref 7–52)
ANION GAP SERPL CALCULATED.3IONS-SCNC: 7 MMOL/L (ref 4–13)
AST SERPL W P-5'-P-CCNC: 9 U/L (ref 13–39)
ATRIAL RATE: 81 BPM
BASOPHILS # BLD AUTO: 0.02 THOUSANDS/ÂΜL (ref 0–0.1)
BASOPHILS NFR BLD AUTO: 0 % (ref 0–1)
BILIRUB SERPL-MCNC: 0.3 MG/DL (ref 0.2–1)
BILIRUB UR QL STRIP: NEGATIVE
BUN SERPL-MCNC: 7 MG/DL (ref 5–25)
CALCIUM SERPL-MCNC: 8.7 MG/DL (ref 8.4–10.2)
CHLORIDE SERPL-SCNC: 104 MMOL/L (ref 96–108)
CLARITY UR: CLEAR
CO2 SERPL-SCNC: 26 MMOL/L (ref 21–32)
COLOR UR: YELLOW
CREAT SERPL-MCNC: 0.67 MG/DL (ref 0.6–1.3)
EOSINOPHIL # BLD AUTO: 0.82 THOUSAND/ÂΜL (ref 0–0.61)
EOSINOPHIL NFR BLD AUTO: 7 % (ref 0–6)
ERYTHROCYTE [DISTWIDTH] IN BLOOD BY AUTOMATED COUNT: 17.7 % (ref 11.6–15.1)
EXT PREGNANCY TEST URINE: NEGATIVE
EXT. CONTROL: NORMAL
GFR SERPL CREATININE-BSD FRML MDRD: 111 ML/MIN/1.73SQ M
GLUCOSE SERPL-MCNC: 103 MG/DL (ref 65–140)
GLUCOSE UR STRIP-MCNC: NEGATIVE MG/DL
HCT VFR BLD AUTO: 44.4 % (ref 34.8–46.1)
HGB BLD-MCNC: 13.9 G/DL (ref 11.5–15.4)
HGB UR QL STRIP.AUTO: NEGATIVE
IMM GRANULOCYTES # BLD AUTO: 0.04 THOUSAND/UL (ref 0–0.2)
IMM GRANULOCYTES NFR BLD AUTO: 0 % (ref 0–2)
KETONES UR STRIP-MCNC: NEGATIVE MG/DL
LEUKOCYTE ESTERASE UR QL STRIP: NEGATIVE
LIPASE SERPL-CCNC: 7 U/L (ref 11–82)
LYMPHOCYTES # BLD AUTO: 2.41 THOUSANDS/ÂΜL (ref 0.6–4.47)
LYMPHOCYTES NFR BLD AUTO: 20 % (ref 14–44)
MCH RBC QN AUTO: 26.8 PG (ref 26.8–34.3)
MCHC RBC AUTO-ENTMCNC: 31.3 G/DL (ref 31.4–37.4)
MCV RBC AUTO: 86 FL (ref 82–98)
MONOCYTES # BLD AUTO: 0.67 THOUSAND/ÂΜL (ref 0.17–1.22)
MONOCYTES NFR BLD AUTO: 6 % (ref 4–12)
NEUTROPHILS # BLD AUTO: 8.17 THOUSANDS/ÂΜL (ref 1.85–7.62)
NEUTS SEG NFR BLD AUTO: 67 % (ref 43–75)
NITRITE UR QL STRIP: NEGATIVE
NRBC BLD AUTO-RTO: 0 /100 WBCS
P AXIS: 40 DEGREES
PH UR STRIP.AUTO: 6 [PH] (ref 4.5–8)
PLATELET # BLD AUTO: 335 THOUSANDS/UL (ref 149–390)
PMV BLD AUTO: 9.5 FL (ref 8.9–12.7)
POTASSIUM SERPL-SCNC: 3.9 MMOL/L (ref 3.5–5.3)
PR INTERVAL: 164 MS
PROT SERPL-MCNC: 7.6 G/DL (ref 6.4–8.4)
PROT UR STRIP-MCNC: NEGATIVE MG/DL
QRS AXIS: -4 DEGREES
QRSD INTERVAL: 88 MS
QT INTERVAL: 388 MS
QTC INTERVAL: 450 MS
RBC # BLD AUTO: 5.18 MILLION/UL (ref 3.81–5.12)
SODIUM SERPL-SCNC: 137 MMOL/L (ref 135–147)
SP GR UR STRIP.AUTO: 1.02 (ref 1–1.03)
T WAVE AXIS: 34 DEGREES
UROBILINOGEN UR QL STRIP.AUTO: 0.2 E.U./DL
VENTRICULAR RATE: 81 BPM
WBC # BLD AUTO: 12.13 THOUSAND/UL (ref 4.31–10.16)

## 2024-04-24 PROCEDURE — 99285 EMERGENCY DEPT VISIT HI MDM: CPT | Performed by: EMERGENCY MEDICINE

## 2024-04-24 PROCEDURE — 96374 THER/PROPH/DIAG INJ IV PUSH: CPT

## 2024-04-24 PROCEDURE — 36415 COLL VENOUS BLD VENIPUNCTURE: CPT

## 2024-04-24 PROCEDURE — 93010 ELECTROCARDIOGRAM REPORT: CPT | Performed by: INTERNAL MEDICINE

## 2024-04-24 PROCEDURE — 93005 ELECTROCARDIOGRAM TRACING: CPT

## 2024-04-24 PROCEDURE — 80053 COMPREHEN METABOLIC PANEL: CPT | Performed by: EMERGENCY MEDICINE

## 2024-04-24 PROCEDURE — 81003 URINALYSIS AUTO W/O SCOPE: CPT

## 2024-04-24 PROCEDURE — 83690 ASSAY OF LIPASE: CPT | Performed by: EMERGENCY MEDICINE

## 2024-04-24 PROCEDURE — 96361 HYDRATE IV INFUSION ADD-ON: CPT

## 2024-04-24 PROCEDURE — 85025 COMPLETE CBC W/AUTO DIFF WBC: CPT | Performed by: EMERGENCY MEDICINE

## 2024-04-24 PROCEDURE — 99284 EMERGENCY DEPT VISIT MOD MDM: CPT

## 2024-04-24 PROCEDURE — 81025 URINE PREGNANCY TEST: CPT

## 2024-04-24 PROCEDURE — 96375 TX/PRO/DX INJ NEW DRUG ADDON: CPT

## 2024-04-24 PROCEDURE — 74177 CT ABD & PELVIS W/CONTRAST: CPT

## 2024-04-24 RX ORDER — ONDANSETRON 4 MG/1
4 TABLET, ORALLY DISINTEGRATING ORAL ONCE
Status: COMPLETED | OUTPATIENT
Start: 2024-04-24 | End: 2024-04-24

## 2024-04-24 RX ORDER — ONDANSETRON 2 MG/ML
4 INJECTION INTRAMUSCULAR; INTRAVENOUS ONCE
Status: COMPLETED | OUTPATIENT
Start: 2024-04-24 | End: 2024-04-24

## 2024-04-24 RX ORDER — DICYCLOMINE HCL 20 MG
20 TABLET ORAL ONCE
Status: COMPLETED | OUTPATIENT
Start: 2024-04-24 | End: 2024-04-24

## 2024-04-24 RX ORDER — KETOROLAC TROMETHAMINE 30 MG/ML
15 INJECTION, SOLUTION INTRAMUSCULAR; INTRAVENOUS ONCE
Status: COMPLETED | OUTPATIENT
Start: 2024-04-24 | End: 2024-04-24

## 2024-04-24 RX ORDER — METOCLOPRAMIDE 10 MG/1
10 TABLET ORAL EVERY 6 HOURS
Qty: 20 TABLET | Refills: 0 | Status: SHIPPED | OUTPATIENT
Start: 2024-04-24 | End: 2024-04-29

## 2024-04-24 RX ORDER — METOCLOPRAMIDE HYDROCHLORIDE 5 MG/ML
10 INJECTION INTRAMUSCULAR; INTRAVENOUS ONCE
Status: COMPLETED | OUTPATIENT
Start: 2024-04-24 | End: 2024-04-24

## 2024-04-24 RX ADMIN — METOCLOPRAMIDE 10 MG: 5 INJECTION, SOLUTION INTRAMUSCULAR; INTRAVENOUS at 13:56

## 2024-04-24 RX ADMIN — IOHEXOL 100 ML: 350 INJECTION, SOLUTION INTRAVENOUS at 14:29

## 2024-04-24 RX ADMIN — KETOROLAC TROMETHAMINE 15 MG: 30 INJECTION, SOLUTION INTRAMUSCULAR at 13:56

## 2024-04-24 RX ADMIN — SODIUM CHLORIDE 1000 ML: 0.9 INJECTION, SOLUTION INTRAVENOUS at 13:25

## 2024-04-24 RX ADMIN — DICYCLOMINE HYDROCHLORIDE 20 MG: 20 TABLET ORAL at 13:25

## 2024-04-24 RX ADMIN — ONDANSETRON 4 MG: 4 TABLET, ORALLY DISINTEGRATING ORAL at 11:55

## 2024-04-24 RX ADMIN — ONDANSETRON 4 MG: 2 INJECTION INTRAMUSCULAR; INTRAVENOUS at 13:25

## 2024-04-24 NOTE — TELEPHONE ENCOUNTER
"Pt called in.    Pt c/f severe vomiting, diarrhea, 10/10 epigastic pain since starting 0.5mg ozempic. Pt titrated dose on Wed and reports sxs started following day. Pt reports vomiting everything that she eats and had to call out of work today 2/2 sxs.    Advised Pt to go to closest ER for evaluation of pancreatitis. Pt verbalized understanding and agreeable to plan. Pt plans on going to [facility]'s ER. Placed on ED Trackboard.    Note, Pt is due for next dose today and does not want to take it due to sxs. Pt asking what she should do.    Call back: [phone]    Answer Assessment - Initial Assessment Questions  1. NAME of MEDICATION: \"What medicine are you calling about?\"      ozempic  2. QUESTION: \"What is your question?\" (e.g., medication refill, side effect)      Abd pain/vomiting/diarrhea  3. PRESCRIBING HCP: \"Who prescribed it?\" Reason: if prescribed by specialist, call should be referred to that group.      MARITZA Clarke PA-C  4. SYMPTOMS: \"Do you have any symptoms?\"      Abd pain/vomiting/diarrhea  5. SEVERITY: If symptoms are present, ask \"Are they mild, moderate or severe?\"      severe  6. PREGNANCY:  \"Is there any chance that you are pregnant?\" \"When was your last menstrual period?\"      no    Protocols used: Medication Question Call-ADULT-[place]    "

## 2024-04-24 NOTE — ED PROVIDER NOTES
History  Chief Complaint   Patient presents with    Vomiting     N/V/D x 2 weeks and upper abd pain. Symptoms started when she increased ozempic dose.     37 y/o female patient with past surgical history of  presenting to the ED complaining of nausea vomiting and diarrhea x 2 weeks and upper abdominal pain.  Patient states that she started her Ozempic dose and is increased 2 weeks ago.  Denies any fevers, chest pain, shortness of breath, urinary symptoms.  Patient states that she is unable to keep anything down.  Denies any blood in emesis or diarrhea. States she has been haiving multiple episodes of diarrhea and vomiting daily. Denies previous hx of this.         Prior to Admission Medications   Prescriptions Last Dose Informant Patient Reported? Taking?   acetaZOLAMIDE (DIAMOX) 250 mg tablet   No No   Sig: TAKE 2 TABLETS BY MOUTH EVERY MORNING   budesonide-formoterol (Symbicort) 160-4.5 mcg/act inhaler   No No   Sig: Inhale 2 puffs 2 (two) times a day Rinse mouth after use. Use additional puff if needed daily   ferrous sulfate 324 (65 Fe) mg   No No   Sig: Take 1 tablet (324 mg total) by mouth every other day   fluticasone-salmeterol (Advair HFA) 45-21 MCG/ACT inhaler   No No   Sig: Inhale 2 puffs 2 (two) times a day Rinse mouth after use.   Patient not taking: Reported on 3/8/2024   gabapentin (NEURONTIN) 100 mg capsule   No No   Sig: Take 1 capsule (100 mg total) by mouth 3 (three) times a day   indomethacin (INDOCIN) 25 mg capsule   No No   Sig: TAKE 1 TO 2 CAPSULES BY MOUTH EVERY 8 HOURS AS NEEDED FOR SEVERE HEADACHE WITH FOOD   Patient not taking: Reported on 11/3/2023   pantoprazole (PROTONIX) 40 mg tablet   No No   Sig: TAKE 1 TABLET BY MOUTH EVERY DAY   semaglutide, 0.25 or 0.5 mg/dose, (Ozempic, 0.25 or 0.5 MG/DOSE,) 2 mg/3 mL injection pen   No No   Sig: Inject 0.25 mg once a week for 1 month and then inject 0.5mg weekly   topiramate (TOPAMAX) 50 MG tablet   No No   Sig: TAKE 1 AND 1/2 TABLETS  (75MG) AT BEDTIME FOR 1 WEEK THEN TAKE 2 TABLETS (100MG) AT BEDTIME   Patient not taking: Reported on 2024      Facility-Administered Medications: None       Past Medical History:   Diagnosis Date    Asthma        Past Surgical History:   Procedure Laterality Date     SECTION      FL LUMBAR PUNCTURE DIAGNOSTIC  2022    TUBAL LIGATION         Family History   Problem Relation Age of Onset    Arthritis Mother     Fibromyalgia Mother     Rheum arthritis Mother     No Known Problems Sister     No Known Problems Daughter     No Known Problems Daughter     No Known Problems Daughter     No Known Problems Daughter     Breast cancer Maternal Grandmother     No Known Problems Other     Diabetes Family     Cancer Family     Heart disease Family     Breast cancer Maternal Aunt      I have reviewed and agree with the history as documented.    E-Cigarette/Vaping    E-Cigarette Use Never User      E-Cigarette/Vaping Substances    Nicotine No     THC No     CBD No     Flavoring No     Other No     Unknown No      Social History     Tobacco Use    Smoking status: Never    Smokeless tobacco: Never   Vaping Use    Vaping status: Never Used   Substance Use Topics    Alcohol use: No    Drug use: No        Review of Systems   Gastrointestinal:  Positive for abdominal pain, diarrhea, nausea and vomiting.   All other systems reviewed and are negative.      Physical Exam  ED Triage Vitals [24 1149]   Temperature Pulse Respirations Blood Pressure SpO2   98.5 °F (36.9 °C) 85 16 (!) 167/105 98 %      Temp src Heart Rate Source Patient Position - Orthostatic VS BP Location FiO2 (%)   -- -- Lying Right arm --      Pain Score       10 - Worst Possible Pain             Orthostatic Vital Signs  Vitals:    24 1149   BP: (!) 167/105   Pulse: 85   Patient Position - Orthostatic VS: Lying       Physical Exam  Vitals reviewed.   Constitutional:       Appearance: Normal appearance. She is obese.   HENT:      Head:  Normocephalic and atraumatic.      Nose: Nose normal.      Mouth/Throat:      Mouth: Mucous membranes are moist.      Pharynx: Oropharynx is clear.   Eyes:      Extraocular Movements: Extraocular movements intact.      Conjunctiva/sclera: Conjunctivae normal.   Cardiovascular:      Rate and Rhythm: Normal rate and regular rhythm.      Pulses: Normal pulses.      Heart sounds: Normal heart sounds.   Pulmonary:      Effort: Pulmonary effort is normal.      Breath sounds: Normal breath sounds.   Abdominal:      General: Bowel sounds are normal.      Palpations: Abdomen is soft.      Tenderness: There is abdominal tenderness (RUQ,LUQ, epigastric tenderness).   Musculoskeletal:         General: Normal range of motion.      Cervical back: Normal range of motion.   Skin:     General: Skin is warm and dry.   Neurological:      General: No focal deficit present.      Mental Status: She is alert and oriented to person, place, and time. Mental status is at baseline.         ED Medications  Medications   ondansetron (ZOFRAN-ODT) dispersible tablet 4 mg (4 mg Oral Given 4/24/24 1155)   ondansetron (ZOFRAN) injection 4 mg (4 mg Intravenous Given 4/24/24 1325)   dicyclomine (BENTYL) tablet 20 mg (20 mg Oral Given 4/24/24 1325)   sodium chloride 0.9 % bolus 1,000 mL (0 mL Intravenous Stopped 4/24/24 1528)   metoclopramide (REGLAN) injection 10 mg (10 mg Intravenous Given 4/24/24 1356)   ketorolac (TORADOL) injection 15 mg (15 mg Intravenous Given 4/24/24 1356)   iohexol (OMNIPAQUE) 350 MG/ML injection (MULTI-DOSE) 100 mL (100 mL Intravenous Given 4/24/24 1429)       Diagnostic Studies  Results Reviewed       Procedure Component Value Units Date/Time    POCT pregnancy, urine [[id number]]  (Normal) Resulted: 04/24/24 1330    Lab Status: Final result Updated: 04/24/24 1330     EXT Preg Test, Ur Negative     Control Valid    Urine Macroscopic, POC [[id number]] Collected: 04/24/24 1328    Lab Status: Final result Specimen: Urine Updated:  04/24/24 1329     Color, UA Yellow     Clarity, UA Clear     pH, UA 6.0     Leukocytes, UA Negative     Nitrite, UA Negative     Protein, UA Negative mg/dl      Glucose, UA Negative mg/dl      Ketones, UA Negative mg/dl      Urobilinogen, UA 0.2 E.U./dl      Bilirubin, UA Negative     Occult Blood, UA Negative     Specific Gravity, UA 1.025    Narrative:      CLINITEK RESULT    Comprehensive metabolic panel [[id number]]  (Abnormal) Collected: 04/24/24 1155    Lab Status: Final result Specimen: Blood from Arm, Left Updated: 04/24/24 1233     Sodium 137 mmol/L      Potassium 3.9 mmol/L      Chloride 104 mmol/L      CO2 26 mmol/L      ANION GAP 7 mmol/L      BUN 7 mg/dL      Creatinine 0.67 mg/dL      Glucose 103 mg/dL      Calcium 8.7 mg/dL      AST 9 U/L      ALT 13 U/L      Alkaline Phosphatase 79 U/L      Total Protein 7.6 g/dL      Albumin 3.7 g/dL      Total Bilirubin 0.30 mg/dL      eGFR 111 ml/min/1.73sq m     Narrative:      National Kidney Disease Foundation guidelines for Chronic Kidney Disease (CKD):     Stage 1 with normal or high GFR (GFR > 90 mL/min/1.73 square meters)    Stage 2 Mild CKD (GFR = 60-89 mL/min/1.73 square meters)    Stage 3A Moderate CKD (GFR = 45-59 mL/min/1.73 square meters)    Stage 3B Moderate CKD (GFR = 30-44 mL/min/1.73 square meters)    Stage 4 Severe CKD (GFR = 15-29 mL/min/1.73 square meters)    Stage 5 End Stage CKD (GFR <15 mL/min/1.73 square meters)  Note: GFR calculation is accurate only with a steady state creatinine    Lipase [[id number]]  (Abnormal) Collected: 04/24/24 1155    Lab Status: Final result Specimen: Blood from Arm, Left Updated: 04/24/24 1233     Lipase 7 u/L     CBC and differential [[id number]]  (Abnormal) Collected: 04/24/24 1155    Lab Status: Final result Specimen: Blood from Arm, Left Updated: 04/24/24 1214     WBC 12.13 Thousand/uL      RBC 5.18 Million/uL      Hemoglobin 13.9 g/dL      Hematocrit 44.4 %      MCV 86 fL      MCH 26.8 pg      MCHC 31.3 g/dL       RDW 17.7 %      MPV 9.5 fL      Platelets 335 Thousands/uL      nRBC 0 /100 WBCs      Segmented % 67 %      Immature Grans % 0 %      Lymphocytes % 20 %      Monocytes % 6 %      Eosinophils Relative 7 %      Basophils Relative 0 %      Absolute Neutrophils 8.17 Thousands/µL      Absolute Immature Grans 0.04 Thousand/uL      Absolute Lymphocytes 2.41 Thousands/µL      Absolute Monocytes 0.67 Thousand/µL      Eosinophils Absolute 0.82 Thousand/µL      Basophils Absolute 0.02 Thousands/µL                    CT abdomen pelvis with contrast   Final Result by Pollo Rutherford MD (04/24 1457)      1.  No acute inflammatory process in the abdomen or pelvis.      2.  Simple appearing right ovarian cyst which does not require routine follow-up in a premenopausal patient.         Workstation performed: [id number]               Procedures  Procedures      ED Course  ED Course as of 04/25/24 1906   Wed Apr 24, 2024   1350 Patient has no improvmeent.    1455 Feeling improved after reglan                             SBIRT 20yo+      Flowsheet Row Most Recent Value   Initial Alcohol Screen: US AUDIT-C     1. How often do you have a drink containing alcohol? 0 Filed at: 04/24/2024 1150   2. How many drinks containing alcohol do you have on a typical day you are drinking?  0 Filed at: 04/24/2024 1150   3a. Male UNDER 65: How often do you have five or more drinks on one occasion? 0 Filed at: 04/24/2024 1150   3b. FEMALE Any Age, or MALE 65+: How often do you have 4 or more drinks on one occassion? 0 Filed at: 04/24/2024 1150   Audit-C Score 0 Filed at: 04/24/2024 1150   LETY: How many times in the past year have you...    Used an illegal drug or used a prescription medication for non-medical reasons? Never Filed at: 04/24/2024 1150                  Medical Decision Making  39 y/o female patient presenting with v/d and upper abdominal pain onset 2 weeks. Patinet is on ozempic. Ddx includes gastorenteritis, ozempic side effect,  pancreatitis, biliary etiology. Patient treated with zofran with no improvement. Exam shows epigastric tenderness. Labs show leukocytosis. Ct abdomen pelvis shows no acute etiology. Patient improved with fluids, reglan, and toradol. Stable for discharge with follow up with PCP. Return precautions given. Instructed to follow up with provider giving him ozempic.     Amount and/or Complexity of Data Reviewed  Labs: ordered.  Radiology: ordered.    Risk  Prescription drug management.          Disposition  Final diagnoses:   Nausea vomiting and diarrhea   Medication side effect     Time reflects when diagnosis was documented in both MDM as applicable and the Disposition within this note       Time User Action Codes Description Comment    4/24/2024  3:08 PM Shahram Cole Add [R11.2,  R19.7] Nausea vomiting and diarrhea     4/24/2024  3:08 PM Shahram Cole Add [T88.7XXA] Medication side effect           ED Disposition       ED Disposition   Discharge    Condition   Stable    Date/Time   Wed Apr 24, 2024 1508    Comment   Cindy Bolden discharge to home/self care.                   Follow-up Information    None         Discharge Medication List as of 4/24/2024  3:16 PM        START taking these medications    Details   metoclopramide (Reglan) 10 mg tablet Take 1 tablet (10 mg total) by mouth every 6 (six) hours for 5 days, Starting Wed 4/24/2024, Until Mon 4/29/2024, Normal           CONTINUE these medications which have NOT CHANGED    Details   acetaZOLAMIDE (DIAMOX) 250 mg tablet TAKE 2 TABLETS BY MOUTH EVERY MORNING, Normal      budesonide-formoterol (Symbicort) 160-4.5 mcg/act inhaler Inhale 2 puffs 2 (two) times a day Rinse mouth after use. Use additional puff if needed daily, Starting Wed 1/3/2024, Normal      ferrous sulfate 324 (65 Fe) mg Take 1 tablet (324 mg total) by mouth every other day, Starting Wed 9/29/2021, Normal      fluticasone-salmeterol (Advair HFA) 45-21 MCG/ACT inhaler Inhale 2 puffs 2 (two)  times a day Rinse mouth after use., Starting Fri 2/2/2024, Normal      gabapentin (NEURONTIN) 100 mg capsule Take 1 capsule (100 mg total) by mouth 3 (three) times a day, Starting Mon 1/9/2023, Normal      indomethacin (INDOCIN) 25 mg capsule TAKE 1 TO 2 CAPSULES BY MOUTH EVERY 8 HOURS AS NEEDED FOR SEVERE HEADACHE WITH FOOD, Normal      pantoprazole (PROTONIX) 40 mg tablet TAKE 1 TABLET BY MOUTH EVERY DAY, Normal      semaglutide, 0.25 or 0.5 mg/dose, (Ozempic, 0.25 or 0.5 MG/DOSE,) 2 mg/3 mL injection pen Inject 0.25 mg once a week for 1 month and then inject 0.5mg weekly, Normal      topiramate (TOPAMAX) 50 MG tablet TAKE 1 AND 1/2 TABLETS (75MG) AT BEDTIME FOR 1 WEEK THEN TAKE 2 TABLETS (100MG) AT BEDTIME, Normal           No discharge procedures on file.    PDMP Review       None             ED Provider  Attending physically available and evaluated Cindy CATALINO Bolden. I managed the patient along with the ED Attending.    Electronically Signed by           Shahram Cole MD  04/25/24 1618

## 2024-04-24 NOTE — ED ATTENDING ATTESTATION
4/24/2024  I, Jatinder Shah DO, saw and evaluated the patient. I have discussed the patient with the resident/non-physician practitioner and agree with the resident's/non-physician practitioner's findings, Plan of Care, and MDM as documented in the resident's/non-physician practitioner's note, except where noted. All available labs and Radiology studies were reviewed.  I was present for key portions of any procedure(s) performed by the resident/non-physician practitioner and I was immediately available to provide assistance.       At this point I agree with the current assessment done in the Emergency Department.  I have conducted an independent evaluation of this patient a history and physical is as follows:    Patient is a 38-year-old female with a history of asthma, iron deficiency anemia, type 2 diabetes, previous tubal ligation, says 6 weeks ago she started Ozempic prescribed by weight management for weight loss.  She was doing okay up until 3 weeks ago when she started the increased dose of Ozempic.  She had a dose 2 weeks ago, then 1 week ago and today she was supposed to take the third dose of the increased dose but says that she did not out of concerns for symptoms.  She says ever since starting the increased dos she is been having multiple episodes of nonbloody, nonbilious emesis and watery, nonbloody, mucousy diarrhea.  She has abdominal cramping and discomfort, worse when she tries to eat or drink anything.  No travel history, no sick contacts, no recent camping or backpacking or drinking from streams, no recent hospitalization or antibiotics.  She has been trying Pepto-Bismol, and other over-the-counter medications but does not have a prescription for antiemetics currently.  In the past she has used Zofran for nausea and has had good relief.  Today she called weight management for the first time regarding the symptoms and was advised to go to the ED.    General:  Patient appears slightly nauseous  and uncomfortable  Head:  Atraumatic  Eyes:  Conjunctiva pink  ENT:  Mucous membranes are moist  Neck:  Supple  Cardiac:  S1-S2, without murmurs  Lungs:  Clear to auscultation bilaterally  Abdomen:  Soft, mild epigastric tenderness, normal bowel sounds, no CVA tenderness, no tympany, no rigidity, no guarding  Extremities:  Normal range of motion  Neurologic:  Awake, fluent speech, normal comprehension. AAOx3.   Skin:  Pink warm and dry  Psychiatric:  Alert, cooperative          ED Course  ED Course as of 04/24/24 1520   Wed Apr 24, 2024   1410 ECG performed at 1408 hrs. interpreted by me, sinus rhythm, rate of 81, no acute ischemic or infarctive changes, normal intervals     Labs Reviewed   CBC AND DIFFERENTIAL - Abnormal       Result Value Ref Range Status    WBC 12.13 (*) 4.31 - 10.16 Thousand/uL Final    RBC 5.18 (*) 3.81 - 5.12 Million/uL Final    Hemoglobin 13.9  11.5 - 15.4 g/dL Final    Hematocrit 44.4  34.8 - 46.1 % Final    MCV 86  82 - 98 fL Final    MCH 26.8  26.8 - 34.3 pg Final    MCHC 31.3 (*) 31.4 - 37.4 g/dL Final    RDW 17.7 (*) 11.6 - 15.1 % Final    MPV 9.5  8.9 - 12.7 fL Final    Platelets 335  149 - 390 Thousands/uL Final    nRBC 0  /100 WBCs Final    Segmented % 67  43 - 75 % Final    Immature Grans % 0  0 - 2 % Final    Lymphocytes % 20  14 - 44 % Final    Monocytes % 6  4 - 12 % Final    Eosinophils Relative 7 (*) 0 - 6 % Final    Basophils Relative 0  0 - 1 % Final    Absolute Neutrophils 8.17 (*) 1.85 - 7.62 Thousands/µL Final    Absolute Immature Grans 0.04  0.00 - 0.20 Thousand/uL Final    Absolute Lymphocytes 2.41  0.60 - 4.47 Thousands/µL Final    Absolute Monocytes 0.67  0.17 - 1.22 Thousand/µL Final    Eosinophils Absolute 0.82 (*) 0.00 - 0.61 Thousand/µL Final    Basophils Absolute 0.02  0.00 - 0.10 Thousands/µL Final   COMPREHENSIVE METABOLIC PANEL - Abnormal    Sodium 137  135 - 147 mmol/L Final    Potassium 3.9  3.5 - 5.3 mmol/L Final    Chloride 104  96 - 108 mmol/L Final    CO2  26  21 - 32 mmol/L Final    ANION GAP 7  4 - 13 mmol/L Final    BUN 7  5 - 25 mg/dL Final    Creatinine 0.67  0.60 - 1.30 mg/dL Final    Comment: Standardized to IDMS reference method    Glucose 103  65 - 140 mg/dL Final    Comment: If the patient is fasting, the ADA then defines impaired fasting glucose as > 100 mg/dL and diabetes as > or equal to 123 mg/dL.    Calcium 8.7  8.4 - 10.2 mg/dL Final    AST 9 (*) 13 - 39 U/L Final    ALT 13  7 - 52 U/L Final    Comment: Specimen collection should occur prior to Sulfasalazine administration due to the potential for falsely depressed results.     Alkaline Phosphatase 79  34 - 104 U/L Final    Total Protein 7.6  6.4 - 8.4 g/dL Final    Albumin 3.7  3.5 - 5.0 g/dL Final    Total Bilirubin 0.30  0.20 - 1.00 mg/dL Final    Comment: Use of this assay is not recommended for patients undergoing treatment with eltrombopag due to the potential for falsely elevated results.  N-acetyl-p-benzoquinone imine (metabolite of Acetaminophen) will generate erroneously low results in samples for patients that have taken an overdose of Acetaminophen.    eGFR 111  ml/min/1.73sq m Final    Narrative:     National Kidney Disease Foundation guidelines for Chronic Kidney Disease (CKD):     Stage 1 with normal or high GFR (GFR > 90 mL/min/1.73 square meters)    Stage 2 Mild CKD (GFR = 60-89 mL/min/1.73 square meters)    Stage 3A Moderate CKD (GFR = 45-59 mL/min/1.73 square meters)    Stage 3B Moderate CKD (GFR = 30-44 mL/min/1.73 square meters)    Stage 4 Severe CKD (GFR = 15-29 mL/min/1.73 square meters)    Stage 5 End Stage CKD (GFR <15 mL/min/1.73 square meters)  Note: GFR calculation is accurate only with a steady state creatinine   LIPASE - Abnormal    Lipase 7 (*) 11 - 82 u/L Final   POCT PREGNANCY, URINE - Normal    EXT Preg Test, Ur Negative   Final    Control Valid   Final   URINE MACROSCOPIC, POC    Color, UA Yellow   Final    Clarity, UA Clear   Final    pH, UA 6.0  4.5 - 8.0 Final     Leukocytes, UA Negative  Negative Final    Nitrite, UA Negative  Negative Final    Protein, UA Negative  Negative mg/dl Final    Glucose, UA Negative  Negative mg/dl Final    Ketones, UA Negative  Negative mg/dl Final    Urobilinogen, UA 0.2  0.2, 1.0 E.U./dl E.U./dl Final    Bilirubin, UA Negative  Negative Final    Occult Blood, UA Negative  Negative Final    Specific Gravity, UA 1.025  1.003 - 1.030 Final    Narrative:     CLINITEK RESULT       CT abdomen pelvis with contrast   Final Result      1.  No acute inflammatory process in the abdomen or pelvis.      2.  Simple appearing right ovarian cyst which does not require routine follow-up in a premenopausal patient.         Workstation performed: [id number]             On reassessment after symptomatic management with Reglan patient was feeling significant better.  At this point suspect the patient is having mild side effects from the Ozempic.  She has no evidence of life-threatening metabolic abnormality, no evidence of life-threatening intra-abdominal hemorrhage, bowel obstruction, pancreatitis or ectopic pregnancy.  She feels comfortably discharged home and following up with her weight management physicians, given a prescription for Reglan.Supportive care, importance of follow-up and return precautions were discussed with the patient, who expressed understanding.      DIAGNOSIS:  Acute nausea and vomiting, acute diarrhea, acute medication side effect    MEDICAL DECISION MAKING CODING    Patient presents with acute new problem with:  Threat to life or bodily function    Chronic conditions affecting care: As per HPI    COLLECTION AND INTERPRETATION OF DATA  I reviewed prior external notes, including March 8, 2024 bariatrics office visit    I ordered each unique test  Tests reviewed personally by me:  ECG: See my ED course  Labs: See above  Imaging: I independently reviewed the CT abdomen pelvis and found no acute pathology.    RISK  Drugs (OTC, Rx, Controlled  substances): Prescription management      Surgery  -I considered surgery may be necessary prior to completion of the work up but afterwards there is no indication for immediate surgery          Critical Care Time  Procedures

## 2024-04-24 NOTE — DISCHARGE INSTRUCTIONS
You were seen in the ED for nausea and vomiting and diarrhea. Return to the ED for any worsening symptoms or new symptoms. Follow up with your primary care doctor as soon as possible.  Take reglan as needed and immodium for your diarrhea.

## 2024-04-26 ENCOUNTER — TELEPHONE (OUTPATIENT)
Age: 39
End: 2024-04-26

## 2024-04-26 ENCOUNTER — TELEPHONE (OUTPATIENT)
Dept: BARIATRICS | Facility: CLINIC | Age: 39
End: 2024-04-26

## 2024-04-26 ENCOUNTER — CLINICAL SUPPORT (OUTPATIENT)
Dept: BARIATRICS | Facility: CLINIC | Age: 39
End: 2024-04-26

## 2024-04-26 DIAGNOSIS — E66.9 OBESITY, UNSPECIFIED: Primary | ICD-10-CM

## 2024-04-26 PROCEDURE — RECHECK

## 2024-04-26 NOTE — TELEPHONE ENCOUNTER
Patient calling in and double check ing that lyft ride was set up - do not see any notes stating that it was       Please call lyft and set ride up for today appt is at 9:30am

## 2024-05-15 ENCOUNTER — PREP FOR PROCEDURE (OUTPATIENT)
Dept: BARIATRICS | Facility: CLINIC | Age: 39
End: 2024-05-15

## 2024-05-15 DIAGNOSIS — E11.9 DIABETES MELLITUS (HCC): ICD-10-CM

## 2024-05-15 DIAGNOSIS — J45.40 MODERATE PERSISTENT ASTHMA: ICD-10-CM

## 2024-05-15 DIAGNOSIS — G93.2 BENIGN INTRACRANIAL HYPERTENSION: ICD-10-CM

## 2024-05-15 DIAGNOSIS — K21.9 ESOPHAGEAL REFLUX: ICD-10-CM

## 2024-05-15 DIAGNOSIS — E66.01 MORBID OBESITY (HCC): Primary | ICD-10-CM

## 2024-05-22 ENCOUNTER — TELEPHONE (OUTPATIENT)
Age: 39
End: 2024-05-22

## 2024-05-22 RX ORDER — MULTIVITAMIN
1 CAPSULE ORAL DAILY
COMMUNITY

## 2024-05-22 NOTE — TELEPHONE ENCOUNTER
PT has an appt tomorrow morning and usually the office gets her a Lyft ride back and forth and she has not received any info yet.  I told her I didn't see anything in the the notes, but I did add it to her appt notes and will send a message over to the office to please set this up for her

## 2024-05-22 NOTE — PRE-PROCEDURE INSTRUCTIONS
Pre-Surgery Instructions:   Medication Instructions    acetaZOLAMIDE (DIAMOX) 250 mg tablet Take day of surgery. With sip of water     BIOTIN PO Stop taking 7 days prior to surgery.    budesonide-formoterol (Symbicort) 160-4.5 mcg/act inhaler Take day of surgery.    Cholecalciferol (VITAMIN D-3 PO) Stop taking 7 days prior to surgery.    ferrous sulfate 324 (65 Fe) mg Hold day of surgery.    gabapentin (NEURONTIN) 100 mg capsule Uses PRN- OK to take day of surgery- if needed may take with sip of water     Multiple Vitamin (multivitamin) capsule Stop taking 7 days prior to surgery.    pantoprazole (PROTONIX) 40 mg tablet Uses PRN- OK to take day of surgery- if needed may take DOS with sip of water     Pt reports having appt tomorrow with office 5/23/24 - instructed pt should be getting pre surgery drinks and cleanser at office appt.  Instructed pt on showering for surgery - both night before and DOS, pt did verbalize understanding of instructions given.  Pt instructed no razor blade shaving within one week prior to surgery -ok to use electric razor up till 6/2- no shaving 6/3 or 6/4.  Pt instructed if with any health status changes between now and DOS -notify surgeon office.    Medication instructions for day surgery reviewed. Please use only a sip of water to take your instructed medications. Avoid all over the counter vitamins, supplements and NSAIDS for one week prior to surgery per anesthesia guidelines. Tylenol is ok to take as needed.     You will receive a call one business day prior to surgery with an arrival time and hospital directions. If your surgery is scheduled on a Monday, the hospital will be calling you on the Friday prior to your surgery. If you have not heard from anyone by 8pm, please call the hospital supervisor through the hospital  at [phone]. ([place] [phone] or [place] [phone]).    Do not eat or drink anything after midnight the night before your surgery, including  candy, mints, lifesavers, or chewing gum. Do not drink alcohol 24hrs before your surgery. Try not to smoke at least 24hrs before your surgery.       Follow the pre surgery showering instructions as listed in the “My Surgical Experience Booklet” or otherwise provided by your surgeon's office. Do not use a blade to shave the surgical area 1 week before surgery. It is okay to use a clean electric clippers up to 24 hours before surgery. Do not apply any lotions, creams, including makeup, cologne, deodorant, or perfumes after showering on the day of your surgery. Do not use dry shampoo, hair spray, hair gel, or any type of hair products.     No contact lenses, eye make-up, or artificial eyelashes. Remove nail polish, including gel polish, and any artificial, gel, or acrylic nails if possible. Remove all jewelry including rings and body piercing jewelry.     Wear causal clothing that is easy to take on and off. Consider your type of surgery.    Keep any valuables, jewelry, piercings at home. Please bring any specially ordered equipment (sling, braces) if indicated.    Arrange for a responsible person to drive you to and from the hospital on the day of your surgery. Please confirm the visitor policy for the day of your procedure when you receive your phone call with an arrival time.     Call the surgeon's office with any new illnesses, exposures, or additional questions prior to surgery.    Please reference your “My Surgical Experience Booklet” for additional information to prepare for your upcoming surgery.

## 2024-05-23 ENCOUNTER — CLINICAL SUPPORT (OUTPATIENT)
Dept: BARIATRICS | Facility: CLINIC | Age: 39
End: 2024-05-23

## 2024-05-23 ENCOUNTER — TELEPHONE (OUTPATIENT)
Age: 39
End: 2024-05-23

## 2024-05-23 ENCOUNTER — OFFICE VISIT (OUTPATIENT)
Dept: BARIATRICS | Facility: CLINIC | Age: 39
End: 2024-05-23
Payer: COMMERCIAL

## 2024-05-23 VITALS
SYSTOLIC BLOOD PRESSURE: 130 MMHG | HEIGHT: 63 IN | HEART RATE: 63 BPM | DIASTOLIC BLOOD PRESSURE: 86 MMHG | TEMPERATURE: 98.9 F | WEIGHT: 293 LBS | BODY MASS INDEX: 51.91 KG/M2 | OXYGEN SATURATION: 99 %

## 2024-05-23 DIAGNOSIS — G89.29 CHRONIC INTRACTABLE HEADACHE, UNSPECIFIED HEADACHE TYPE: ICD-10-CM

## 2024-05-23 DIAGNOSIS — E66.01 MORBID OBESITY WITH BMI OF 50.0-59.9, ADULT (HCC): Primary | ICD-10-CM

## 2024-05-23 DIAGNOSIS — R51.9 CHRONIC INTRACTABLE HEADACHE, UNSPECIFIED HEADACHE TYPE: ICD-10-CM

## 2024-05-23 DIAGNOSIS — G93.2 IDIOPATHIC INTRACRANIAL HYPERTENSION: ICD-10-CM

## 2024-05-23 DIAGNOSIS — F32.A DEPRESSION, UNSPECIFIED DEPRESSION TYPE: ICD-10-CM

## 2024-05-23 DIAGNOSIS — E66.01 CLASS 3 SEVERE OBESITY DUE TO EXCESS CALORIES WITH SERIOUS COMORBIDITY AND BODY MASS INDEX (BMI) OF 50.0 TO 59.9 IN ADULT (HCC): Primary | ICD-10-CM

## 2024-05-23 DIAGNOSIS — E11.9 TYPE 2 DIABETES MELLITUS WITHOUT COMPLICATION, WITHOUT LONG-TERM CURRENT USE OF INSULIN (HCC): ICD-10-CM

## 2024-05-23 DIAGNOSIS — F41.9 ANXIETY: ICD-10-CM

## 2024-05-23 PROCEDURE — RECHECK: Performed by: DIETITIAN, REGISTERED

## 2024-05-23 PROCEDURE — 99213 OFFICE O/P EST LOW 20 MIN: CPT | Performed by: SURGERY

## 2024-05-23 RX ORDER — OFLOXACIN 3 MG/ML
SOLUTION/ DROPS OPHTHALMIC
COMMUNITY
Start: 2024-03-25

## 2024-05-23 RX ORDER — CEFAZOLIN SODIUM 2 G/50ML
2000 SOLUTION INTRAVENOUS ONCE
OUTPATIENT
Start: 2024-05-23 | End: 2024-05-23

## 2024-05-23 RX ORDER — CELECOXIB 200 MG/1
200 CAPSULE ORAL ONCE
OUTPATIENT
Start: 2024-05-23 | End: 2024-05-23

## 2024-05-23 RX ORDER — ENOXAPARIN SODIUM 100 MG/ML
40 INJECTION SUBCUTANEOUS ONCE
OUTPATIENT
Start: 2024-05-23 | End: 2024-05-23

## 2024-05-23 NOTE — TELEPHONE ENCOUNTER
Patient calling in and is coming in for 8am class and is running late, we ordered her a lyft and the first one cancelled and they second one just picked her up. Her ETA is 8:13

## 2024-05-23 NOTE — H&P
BARIATRIC H&P - BARIATRIC SURGERY  Cindy Bolden 38 y.o. female MRN: [id number]  Unit/Bed#:  Encounter: [id number]      HPI:  Cindy Bolden is a 38 y.o. female who presents with a long-standing history of morbid obesity.    She was found to be a good candidate to undergo a bariatric operation upon being enrolled [facility].    She is here today to discuss details of her surgery.    Review of Systems   All other systems reviewed and are negative.      Historical Information   Past Medical History:   Diagnosis Date    Anemia     Asthma     Eczema     LEFT shin area per pt    GERD (gastroesophageal reflux disease)     pantoprazole prn per pt    Pseudotumor      Past Surgical History:   Procedure Laterality Date     SECTION      EGD      FL LUMBAR PUNCTURE DIAGNOSTIC  2022    TUBAL LIGATION       Social History   Social History     Substance and Sexual Activity   Alcohol Use Yes    Comment: occasional use- avg use couple times a month     Social History     Substance and Sexual Activity   Drug Use No    Comment: Denies any drug use per pt     Social History     Tobacco Use   Smoking Status Never   Smokeless Tobacco Never   Tobacco Comments    Never a smoker or use of tobacco products per pt      Family History: non-contributory    Meds/Allergies   all medications and allergies reviewed  No Known Allergies    Objective     Current Vitals:          Invasive Devices       None                   Physical Exam  Vitals and nursing note reviewed.   Constitutional:       Appearance: Normal appearance. She is well-developed.   HENT:      Head: Normocephalic and atraumatic.      Nose: Nose normal.   Eyes:      General: No scleral icterus.        Right eye: No discharge.         Left eye: No discharge.      Conjunctiva/sclera: Conjunctivae normal.   Cardiovascular:      Rate and Rhythm: Normal rate and regular rhythm.      Heart sounds: Normal heart sounds.   Pulmonary:       Effort: Pulmonary effort is normal.      Breath sounds: Normal breath sounds. No stridor. No wheezing or rales.   Chest:      Chest wall: No tenderness.   Abdominal:      General: Bowel sounds are normal.      Palpations: Abdomen is soft.      Tenderness: There is no abdominal tenderness. There is no guarding or rebound.      Comments: Abdomen is obese, soft and benign.  Well-healed  scar.   Musculoskeletal:         General: Normal range of motion.      Cervical back: Normal range of motion and neck supple.   Lymphadenopathy:      Cervical: No cervical adenopathy.   Skin:     General: Skin is warm and dry.      Findings: No erythema or rash.   Neurological:      Mental Status: She is alert and oriented to person, place, and time.   Psychiatric:         Behavior: Behavior normal.         Thought Content: Thought content normal.         Judgment: Judgment normal.         Lab Results: I have personally reviewed pertinent lab results.    Imaging: I have personally reviewed pertinent reports.    EKG, Pathology, and Other Studies: I have personally reviewed pertinent reports.    The endoscopy showed gastritis.  The biopsies revealed  Final Diagnosis  A. Stomach, r/o h pylori:      - Gastric antral and oxyntic type mucosa with no significant histopathologic abnormalities       - Negative for intestinal metaplasia, dysplasia or carcinoma      - No Helicobacter pylori is identified on H&E stained slide        Assessment/PLAN:    38 y.o. female morbidly obese found to be a good candidate to undergo a weight loss operation upon being enrolled here at the [facility].    Patient has a long history of morbid obesity and is presenting to discuss the surgical weight loss options. Despite the patient best efforts patient was unable to lose any meaningful or sustainable weight using nonsurgical means.We had a long discussion regarding all the surgical weight-loss options at our disposal at this point and  reviewed the risks and benefits of each procedure in details as it relates to her age, BMI and medical conditions.    She has been pre certified to undergo a Laparoscopic sleeve gastrectomy.  We have discussed the 14%-20% incidence of post op heartburn after the sleeve gastrectomy and the fact that if this cannot be controlled with medication or conservative measures it might need to be converted to a Shawn-en-Y gastric bypass.    We have also discussed the fact that the patient needs to be followed up postoperatively and potentially get screening endoscopies in the future to rule out any development of pathology as a result of the sleeve gastrectomy.      Here today to review her pre op test results.      Has been medically cleared for the procedure.      I have discussed with her at length the risks and benefits of the operation and reiterated the components of our multidisciplinary program and the importance of compliance and follow up in the post operative period. Although there is a great statistical chance of improvement or even resolution of most of her associated comorbidities, the results vary from patient to patient and they largely depend on her commitment.     The patient was also instructed with regards to the importance of behavior modification, nutritional counseling, support meeting attendance and lifestyle changes that are important to ensure success.    I have explained and reviewed the instructions for stopping or tapering anti-obesity medications prior to surgery.  She was given the opportunity to ask questions and I have answered all of them.     I have addressed with the patient the level of CODE STATUS for this hospital stay and after explaining the different options currently she wishes to be a Level I.    She understands and wishes to proceed.    She she still has some weight loss prior to the surgery and we will continue to do the liquid diet.    Renzo Zhao MD  5/23/2024  1:00 PM

## 2024-05-23 NOTE — H&P (VIEW-ONLY)
BARIATRIC H&P - BARIATRIC SURGERY  Cindy Bolden 38 y.o. female MRN: [id number]  Unit/Bed#:  Encounter: [id number]      HPI:  Cindy Bolden is a 38 y.o. female who presents with a long-standing history of morbid obesity.    She was found to be a good candidate to undergo a bariatric operation upon being enrolled here [facility].    She is here today to discuss details of her surgery.    Review of Systems   All other systems reviewed and are negative.      Historical Information   Past Medical History:   Diagnosis Date    Anemia     Asthma     Eczema     LEFT shin area per pt    GERD (gastroesophageal reflux disease)     pantoprazole prn per pt    Pseudotumor      Past Surgical History:   Procedure Laterality Date     SECTION      EGD      FL LUMBAR PUNCTURE DIAGNOSTIC  2022    TUBAL LIGATION       Social History   Social History     Substance and Sexual Activity   Alcohol Use Yes    Comment: occasional use- avg use couple times a month     Social History     Substance and Sexual Activity   Drug Use No    Comment: Denies any drug use per pt     Social History     Tobacco Use   Smoking Status Never   Smokeless Tobacco Never   Tobacco Comments    Never a smoker or use of tobacco products per pt      Family History: non-contributory    Meds/Allergies   all medications and allergies reviewed  No Known Allergies    Objective     Current Vitals:          Invasive Devices       None                   Physical Exam  Vitals and nursing note reviewed.   Constitutional:       Appearance: Normal appearance. She is well-developed.   HENT:      Head: Normocephalic and atraumatic.      Nose: Nose normal.   Eyes:      General: No scleral icterus.        Right eye: No discharge.         Left eye: No discharge.      Conjunctiva/sclera: Conjunctivae normal.   Cardiovascular:      Rate and Rhythm: Normal rate and regular rhythm.      Heart sounds: Normal heart sounds.   Pulmonary:       Effort: Pulmonary effort is normal.      Breath sounds: Normal breath sounds. No stridor. No wheezing or rales.   Chest:      Chest wall: No tenderness.   Abdominal:      General: Bowel sounds are normal.      Palpations: Abdomen is soft.      Tenderness: There is no abdominal tenderness. There is no guarding or rebound.      Comments: Abdomen is obese, soft and benign.  Well-healed  scar.   Musculoskeletal:         General: Normal range of motion.      Cervical back: Normal range of motion and neck supple.   Lymphadenopathy:      Cervical: No cervical adenopathy.   Skin:     General: Skin is warm and dry.      Findings: No erythema or rash.   Neurological:      Mental Status: She is alert and oriented to person, place, and time.   Psychiatric:         Behavior: Behavior normal.         Thought Content: Thought content normal.         Judgment: Judgment normal.         Lab Results: I have personally reviewed pertinent lab results.    Imaging: I have personally reviewed pertinent reports.    EKG, Pathology, and Other Studies: I have personally reviewed pertinent reports.    The endoscopy showed gastritis.  The biopsies revealed  Final Diagnosis  A. Stomach, r/o h pylori:      - Gastric antral and oxyntic type mucosa with no significant histopathologic abnormalities       - Negative for intestinal metaplasia, dysplasia or carcinoma      - No Helicobacter pylori is identified on H&E stained slide        Assessment/PLAN:    38 y.o. female morbidly obese found to be a good candidate to undergo a weight loss operation upon being enrolled here at the [facility].    Patient has a long history of morbid obesity and is presenting to discuss the surgical weight loss options. Despite the patient best efforts patient was unable to lose any meaningful or sustainable weight using nonsurgical means.We had a long discussion regarding all the surgical weight-loss options at our disposal at this point and  reviewed the risks and benefits of each procedure in details as it relates to her age, BMI and medical conditions.    She has been pre certified to undergo a Laparoscopic sleeve gastrectomy.  We have discussed the 14%-20% incidence of post op heartburn after the sleeve gastrectomy and the fact that if this cannot be controlled with medication or conservative measures it might need to be converted to a Shawn-en-Y gastric bypass.    We have also discussed the fact that the patient needs to be followed up postoperatively and potentially get screening endoscopies in the future to rule out any development of pathology as a result of the sleeve gastrectomy.      Here today to review her pre op test results.      Has been medically cleared for the procedure.      I have discussed with her at length the risks and benefits of the operation and reiterated the components of our multidisciplinary program and the importance of compliance and follow up in the post operative period. Although there is a great statistical chance of improvement or even resolution of most of her associated comorbidities, the results vary from patient to patient and they largely depend on her commitment.     The patient was also instructed with regards to the importance of behavior modification, nutritional counseling, support meeting attendance and lifestyle changes that are important to ensure success.    I have explained and reviewed the instructions for stopping or tapering anti-obesity medications prior to surgery.  She was given the opportunity to ask questions and I have answered all of them.     I have addressed with the patient the level of CODE STATUS for this hospital stay and after explaining the different options currently she wishes to be a Level I.    She understands and wishes to proceed.    She she still has some weight loss prior to the surgery and we will continue to do the liquid diet.    Renzo Zhao MD  5/23/2024  1:00 PM

## 2024-05-24 DIAGNOSIS — E66.01 MORBID OBESITY WITH BMI OF 50.0-59.9, ADULT (HCC): Primary | ICD-10-CM

## 2024-05-24 RX ORDER — OMEPRAZOLE 20 MG/1
20 CAPSULE, DELAYED RELEASE ORAL DAILY
Qty: 90 CAPSULE | Refills: 1 | Status: SHIPPED | OUTPATIENT
Start: 2024-05-24

## 2024-05-24 RX ORDER — OXYCODONE HYDROCHLORIDE 5 MG/1
5 TABLET ORAL EVERY 4 HOURS PRN
Qty: 5 TABLET | Refills: 0 | Status: SHIPPED | OUTPATIENT
Start: 2024-06-05

## 2024-05-24 RX ORDER — ENOXAPARIN SODIUM 100 MG/ML
40 INJECTION SUBCUTANEOUS
Qty: 5.2 ML | Refills: 0 | Status: SHIPPED | OUTPATIENT
Start: 2024-06-05 | End: 2024-06-18

## 2024-05-24 NOTE — TELEPHONE ENCOUNTER
[facility] for  Laparoscopic  Sleeve  Gastrectomy  Robotically  Assisted  with  Intraoperative  EGD with Dr Zhao  on 6/4/2024.

## 2024-05-28 ENCOUNTER — TELEPHONE (OUTPATIENT)
Dept: BARIATRICS | Facility: CLINIC | Age: 39
End: 2024-05-28

## 2024-05-28 NOTE — TELEPHONE ENCOUNTER
Pre-op call was made to patient, to follow up on how they are doing and to remind them to continue with all medical and dietary directions that were given at pre-op class regarding liver shrinking diet and hydration. Advised to stop eating all vegetables 48hrs prior to surgery unless directed otherwise by surgeon or RD. They were encouraged to purchase all vitamins and protein shakes for post op use as well as to begin Miralax three days prior to surgery as directed in Section 6 of their manual.  They were reminded of the Ensure Pre-surgery drinks protocol and to bring their completed yellow form with them to surgery as well as their CPAP-BiPAP machine if they use one.  Lastly, they were informed that they would be weighed the morning of surgery and to give the office a call if they had any further questions or concerns.

## 2024-06-01 DIAGNOSIS — J45.40 MODERATE PERSISTENT ASTHMA WITHOUT COMPLICATION: ICD-10-CM

## 2024-06-03 ENCOUNTER — ANESTHESIA EVENT (OUTPATIENT)
Dept: PERIOP | Facility: HOSPITAL | Age: 39
DRG: 403 | End: 2024-06-03
Payer: COMMERCIAL

## 2024-06-03 RX ORDER — BUDESONIDE AND FORMOTEROL FUMARATE DIHYDRATE 160; 4.5 UG/1; UG/1
AEROSOL RESPIRATORY (INHALATION)
Qty: 10.2 G | Refills: 3 | Status: SHIPPED | OUTPATIENT
Start: 2024-06-03

## 2024-06-04 ENCOUNTER — ANESTHESIA (OUTPATIENT)
Dept: PERIOP | Facility: HOSPITAL | Age: 39
DRG: 403 | End: 2024-06-04
Payer: COMMERCIAL

## 2024-06-04 ENCOUNTER — HOSPITAL ENCOUNTER (INPATIENT)
Facility: HOSPITAL | Age: 39
LOS: 1 days | Discharge: HOME | DRG: 403 | End: 2024-06-05
Attending: SURGERY | Admitting: SURGERY
Payer: COMMERCIAL

## 2024-06-04 DIAGNOSIS — J45.40 MODERATE PERSISTENT ASTHMA: ICD-10-CM

## 2024-06-04 DIAGNOSIS — K21.9 ESOPHAGEAL REFLUX: ICD-10-CM

## 2024-06-04 DIAGNOSIS — E66.01 MORBID OBESITY (HCC): ICD-10-CM

## 2024-06-04 DIAGNOSIS — G93.2 BENIGN INTRACRANIAL HYPERTENSION: ICD-10-CM

## 2024-06-04 DIAGNOSIS — E11.9 DIABETES MELLITUS (HCC): ICD-10-CM

## 2024-06-04 LAB
EXT PREGNANCY TEST URINE: NEGATIVE
EXT. CONTROL: NORMAL
GLUCOSE SERPL-MCNC: 134 MG/DL (ref 65–140)
GLUCOSE SERPL-MCNC: 181 MG/DL (ref 65–140)

## 2024-06-04 PROCEDURE — 43775 LAP SLEEVE GASTRECTOMY: CPT | Performed by: SURGERY

## 2024-06-04 PROCEDURE — 81025 URINE PREGNANCY TEST: CPT | Performed by: SURGERY

## 2024-06-04 PROCEDURE — 0DJ08ZZ INSPECTION OF UPPER INTESTINAL TRACT, VIA NATURAL OR ARTIFICIAL OPENING ENDOSCOPIC: ICD-10-PCS | Performed by: SURGERY

## 2024-06-04 PROCEDURE — 8E0W4CZ ROBOTIC ASSISTED PROCEDURE OF TRUNK REGION, PERCUTANEOUS ENDOSCOPIC APPROACH: ICD-10-PCS | Performed by: SURGERY

## 2024-06-04 PROCEDURE — 88307 TISSUE EXAM BY PATHOLOGIST: CPT | Performed by: PATHOLOGY

## 2024-06-04 PROCEDURE — C1781 MESH (IMPLANTABLE): HCPCS | Performed by: SURGERY

## 2024-06-04 PROCEDURE — 82948 REAGENT STRIP/BLOOD GLUCOSE: CPT

## 2024-06-04 PROCEDURE — S2900 ROBOTIC SURGICAL SYSTEM: HCPCS | Performed by: SURGERY

## 2024-06-04 PROCEDURE — C9290 INJ, BUPIVACAINE LIPOSOME: HCPCS | Performed by: STUDENT IN AN ORGANIZED HEALTH CARE EDUCATION/TRAINING PROGRAM

## 2024-06-04 PROCEDURE — 43775 LAP SLEEVE GASTRECTOMY: CPT | Performed by: PHYSICIAN ASSISTANT

## 2024-06-04 PROCEDURE — 0DB64Z3 EXCISION OF STOMACH, PERCUTANEOUS ENDOSCOPIC APPROACH, VERTICAL: ICD-10-PCS | Performed by: SURGERY

## 2024-06-04 DEVICE — SEAMGUARD STPL REINF ENDO GIA ULTRA UNIV 60 PURPLE: Type: IMPLANTABLE DEVICE | Site: ABDOMEN | Status: FUNCTIONAL

## 2024-06-04 DEVICE — SEAMGUARD STPL REINF ENDO GIA ULTRA UNV 60 BLACK: Type: IMPLANTABLE DEVICE | Site: ABDOMEN | Status: FUNCTIONAL

## 2024-06-04 RX ORDER — ONDANSETRON 2 MG/ML
4 INJECTION INTRAMUSCULAR; INTRAVENOUS ONCE AS NEEDED
Status: DISCONTINUED | OUTPATIENT
Start: 2024-06-04 | End: 2024-06-04 | Stop reason: HOSPADM

## 2024-06-04 RX ORDER — FAMOTIDINE 10 MG/ML
20 INJECTION, SOLUTION INTRAVENOUS 2 TIMES DAILY
Status: DISCONTINUED | OUTPATIENT
Start: 2024-06-04 | End: 2024-06-05 | Stop reason: HOSPADM

## 2024-06-04 RX ORDER — FENTANYL CITRATE/PF 50 MCG/ML
50 SYRINGE (ML) INJECTION
Status: COMPLETED | OUTPATIENT
Start: 2024-06-04 | End: 2024-06-04

## 2024-06-04 RX ORDER — SODIUM CHLORIDE 9 MG/ML
125 INJECTION, SOLUTION INTRAVENOUS CONTINUOUS
Status: DISCONTINUED | OUTPATIENT
Start: 2024-06-04 | End: 2024-06-05 | Stop reason: HOSPADM

## 2024-06-04 RX ORDER — ENOXAPARIN SODIUM 100 MG/ML
40 INJECTION SUBCUTANEOUS ONCE
Status: COMPLETED | OUTPATIENT
Start: 2024-06-04 | End: 2024-06-04

## 2024-06-04 RX ORDER — METOCLOPRAMIDE HYDROCHLORIDE 5 MG/ML
10 INJECTION INTRAMUSCULAR; INTRAVENOUS EVERY 6 HOURS PRN
Status: DISCONTINUED | OUTPATIENT
Start: 2024-06-04 | End: 2024-06-05

## 2024-06-04 RX ORDER — BUPIVACAINE HYDROCHLORIDE 5 MG/ML
INJECTION, SOLUTION EPIDURAL; INTRACAUDAL AS NEEDED
Status: DISCONTINUED | OUTPATIENT
Start: 2024-06-04 | End: 2024-06-04 | Stop reason: HOSPADM

## 2024-06-04 RX ORDER — GABAPENTIN 100 MG/1
100 CAPSULE ORAL 3 TIMES DAILY
Status: DISCONTINUED | OUTPATIENT
Start: 2024-06-04 | End: 2024-06-05 | Stop reason: HOSPADM

## 2024-06-04 RX ORDER — LIDOCAINE HYDROCHLORIDE 20 MG/ML
INJECTION, SOLUTION EPIDURAL; INFILTRATION; INTRACAUDAL; PERINEURAL AS NEEDED
Status: DISCONTINUED | OUTPATIENT
Start: 2024-06-04 | End: 2024-06-04

## 2024-06-04 RX ORDER — ONDANSETRON 2 MG/ML
INJECTION INTRAMUSCULAR; INTRAVENOUS AS NEEDED
Status: DISCONTINUED | OUTPATIENT
Start: 2024-06-04 | End: 2024-06-04

## 2024-06-04 RX ORDER — ACETAMINOPHEN 10 MG/ML
1000 INJECTION, SOLUTION INTRAVENOUS EVERY 6 HOURS SCHEDULED
Status: DISCONTINUED | OUTPATIENT
Start: 2024-06-04 | End: 2024-06-05 | Stop reason: HOSPADM

## 2024-06-04 RX ORDER — DIPHENHYDRAMINE HCL 25 MG
25 TABLET ORAL EVERY 8 HOURS PRN
Status: DISCONTINUED | OUTPATIENT
Start: 2024-06-04 | End: 2024-06-05 | Stop reason: HOSPADM

## 2024-06-04 RX ORDER — PROPOFOL 10 MG/ML
INJECTION, EMULSION INTRAVENOUS CONTINUOUS PRN
Status: DISCONTINUED | OUTPATIENT
Start: 2024-06-04 | End: 2024-06-04

## 2024-06-04 RX ORDER — SODIUM CHLORIDE, SODIUM LACTATE, POTASSIUM CHLORIDE, CALCIUM CHLORIDE 600; 310; 30; 20 MG/100ML; MG/100ML; MG/100ML; MG/100ML
100 INJECTION, SOLUTION INTRAVENOUS CONTINUOUS
Status: DISCONTINUED | OUTPATIENT
Start: 2024-06-04 | End: 2024-06-05 | Stop reason: HOSPADM

## 2024-06-04 RX ORDER — SIMETHICONE 80 MG
80 TABLET,CHEWABLE ORAL 4 TIMES DAILY PRN
Status: DISCONTINUED | OUTPATIENT
Start: 2024-06-04 | End: 2024-06-05 | Stop reason: HOSPADM

## 2024-06-04 RX ORDER — FENTANYL CITRATE 50 UG/ML
INJECTION, SOLUTION INTRAMUSCULAR; INTRAVENOUS AS NEEDED
Status: DISCONTINUED | OUTPATIENT
Start: 2024-06-04 | End: 2024-06-04

## 2024-06-04 RX ORDER — ENOXAPARIN SODIUM 100 MG/ML
40 INJECTION SUBCUTANEOUS EVERY 24 HOURS
Status: DISCONTINUED | OUTPATIENT
Start: 2024-06-05 | End: 2024-06-05 | Stop reason: HOSPADM

## 2024-06-04 RX ORDER — OXYCODONE HCL 5 MG/5 ML
10 SOLUTION, ORAL ORAL EVERY 4 HOURS PRN
Status: DISCONTINUED | OUTPATIENT
Start: 2024-06-04 | End: 2024-06-05 | Stop reason: HOSPADM

## 2024-06-04 RX ORDER — ONDANSETRON 2 MG/ML
4 INJECTION INTRAMUSCULAR; INTRAVENOUS EVERY 6 HOURS PRN
Status: DISCONTINUED | OUTPATIENT
Start: 2024-06-04 | End: 2024-06-05 | Stop reason: HOSPADM

## 2024-06-04 RX ORDER — MAGNESIUM HYDROXIDE 1200 MG/15ML
LIQUID ORAL AS NEEDED
Status: DISCONTINUED | OUTPATIENT
Start: 2024-06-04 | End: 2024-06-04 | Stop reason: HOSPADM

## 2024-06-04 RX ORDER — PROMETHAZINE HYDROCHLORIDE 25 MG/ML
25 INJECTION, SOLUTION INTRAMUSCULAR; INTRAVENOUS ONCE AS NEEDED
Status: COMPLETED | OUTPATIENT
Start: 2024-06-04 | End: 2024-06-04

## 2024-06-04 RX ORDER — OXYCODONE HCL 5 MG/5 ML
5 SOLUTION, ORAL ORAL EVERY 4 HOURS PRN
Status: DISCONTINUED | OUTPATIENT
Start: 2024-06-04 | End: 2024-06-05 | Stop reason: HOSPADM

## 2024-06-04 RX ORDER — CELECOXIB 200 MG/1
200 CAPSULE ORAL ONCE
Status: COMPLETED | OUTPATIENT
Start: 2024-06-04 | End: 2024-06-04

## 2024-06-04 RX ORDER — HYDROMORPHONE HCL/PF 1 MG/ML
0.5 SYRINGE (ML) INJECTION
Status: DISCONTINUED | OUTPATIENT
Start: 2024-06-04 | End: 2024-06-04 | Stop reason: HOSPADM

## 2024-06-04 RX ORDER — CEFAZOLIN SODIUM 2 G/50ML
2000 SOLUTION INTRAVENOUS ONCE
Status: COMPLETED | OUTPATIENT
Start: 2024-06-04 | End: 2024-06-04

## 2024-06-04 RX ORDER — ROCURONIUM BROMIDE 10 MG/ML
INJECTION, SOLUTION INTRAVENOUS AS NEEDED
Status: DISCONTINUED | OUTPATIENT
Start: 2024-06-04 | End: 2024-06-04

## 2024-06-04 RX ORDER — MIDAZOLAM HYDROCHLORIDE 2 MG/2ML
INJECTION, SOLUTION INTRAMUSCULAR; INTRAVENOUS AS NEEDED
Status: DISCONTINUED | OUTPATIENT
Start: 2024-06-04 | End: 2024-06-04

## 2024-06-04 RX ORDER — CEFAZOLIN SODIUM 1 G/3ML
INJECTION, POWDER, FOR SOLUTION INTRAMUSCULAR; INTRAVENOUS AS NEEDED
Status: DISCONTINUED | OUTPATIENT
Start: 2024-06-04 | End: 2024-06-04

## 2024-06-04 RX ORDER — PROMETHAZINE HYDROCHLORIDE 25 MG/ML
25 INJECTION, SOLUTION INTRAMUSCULAR; INTRAVENOUS EVERY 6 HOURS PRN
Status: DISCONTINUED | OUTPATIENT
Start: 2024-06-04 | End: 2024-06-05

## 2024-06-04 RX ORDER — PROPOFOL 10 MG/ML
INJECTION, EMULSION INTRAVENOUS AS NEEDED
Status: DISCONTINUED | OUTPATIENT
Start: 2024-06-04 | End: 2024-06-04

## 2024-06-04 RX ORDER — KETOROLAC TROMETHAMINE 30 MG/ML
15 INJECTION, SOLUTION INTRAMUSCULAR; INTRAVENOUS EVERY 6 HOURS SCHEDULED
Status: DISPENSED | OUTPATIENT
Start: 2024-06-04 | End: 2024-06-05

## 2024-06-04 RX ORDER — HYDRALAZINE HYDROCHLORIDE 20 MG/ML
10 INJECTION INTRAMUSCULAR; INTRAVENOUS ONCE
Status: COMPLETED | OUTPATIENT
Start: 2024-06-04 | End: 2024-06-04

## 2024-06-04 RX ADMIN — CEFAZOLIN 1000 MG: 1 INJECTION, POWDER, FOR SOLUTION INTRAMUSCULAR; INTRAVENOUS; PARENTERAL at 07:47

## 2024-06-04 RX ADMIN — HYDRALAZINE HYDROCHLORIDE 10 MG: 20 INJECTION, SOLUTION INTRAMUSCULAR; INTRAVENOUS at 10:33

## 2024-06-04 RX ADMIN — SODIUM CHLORIDE, SODIUM LACTATE, POTASSIUM CHLORIDE, AND CALCIUM CHLORIDE 100 ML/HR: .6; .31; .03; .02 INJECTION, SOLUTION INTRAVENOUS at 11:53

## 2024-06-04 RX ADMIN — MIDAZOLAM 2 MG: 1 INJECTION INTRAMUSCULAR; INTRAVENOUS at 07:35

## 2024-06-04 RX ADMIN — KETOROLAC TROMETHAMINE 15 MG: 30 INJECTION, SOLUTION INTRAMUSCULAR at 11:53

## 2024-06-04 RX ADMIN — CELECOXIB 200 MG: 200 CAPSULE ORAL at 05:34

## 2024-06-04 RX ADMIN — ACETAMINOPHEN 1000 MG: 10 INJECTION INTRAVENOUS at 17:23

## 2024-06-04 RX ADMIN — ROCURONIUM BROMIDE 10 MG: 10 INJECTION, SOLUTION INTRAVENOUS at 08:51

## 2024-06-04 RX ADMIN — GABAPENTIN 100 MG: 100 CAPSULE ORAL at 17:23

## 2024-06-04 RX ADMIN — LIDOCAINE HYDROCHLORIDE 100 MG: 20 INJECTION, SOLUTION EPIDURAL; INFILTRATION; INTRACAUDAL at 07:36

## 2024-06-04 RX ADMIN — ROCURONIUM BROMIDE 10 MG: 10 INJECTION, SOLUTION INTRAVENOUS at 08:29

## 2024-06-04 RX ADMIN — FOSAPREPITANT 150 MG: 150 INJECTION, POWDER, LYOPHILIZED, FOR SOLUTION INTRAVENOUS at 05:44

## 2024-06-04 RX ADMIN — SODIUM CHLORIDE, SODIUM LACTATE, POTASSIUM CHLORIDE, AND CALCIUM CHLORIDE 100 ML/HR: .6; .31; .03; .02 INJECTION, SOLUTION INTRAVENOUS at 21:25

## 2024-06-04 RX ADMIN — PROPOFOL 200 MG: 10 INJECTION, EMULSION INTRAVENOUS at 07:36

## 2024-06-04 RX ADMIN — SUGAMMADEX 600 MG: 100 INJECTION, SOLUTION INTRAVENOUS at 09:10

## 2024-06-04 RX ADMIN — CEFAZOLIN SODIUM 2000 MG: 2 SOLUTION INTRAVENOUS at 07:46

## 2024-06-04 RX ADMIN — ONDANSETRON 8 MG: 2 INJECTION INTRAMUSCULAR; INTRAVENOUS at 09:03

## 2024-06-04 RX ADMIN — SODIUM CHLORIDE: 0.9 INJECTION, SOLUTION INTRAVENOUS at 08:37

## 2024-06-04 RX ADMIN — PROMETHAZINE HYDROCHLORIDE 25 MG: 25 INJECTION INTRAMUSCULAR; INTRAVENOUS at 09:59

## 2024-06-04 RX ADMIN — PROPOFOL 120 MCG/KG/MIN: 10 INJECTION, EMULSION INTRAVENOUS at 07:38

## 2024-06-04 RX ADMIN — METOCLOPRAMIDE 10 MG: 5 INJECTION, SOLUTION INTRAMUSCULAR; INTRAVENOUS at 11:53

## 2024-06-04 RX ADMIN — KETOROLAC TROMETHAMINE 15 MG: 30 INJECTION, SOLUTION INTRAMUSCULAR at 17:23

## 2024-06-04 RX ADMIN — ENOXAPARIN SODIUM 40 MG: 40 INJECTION SUBCUTANEOUS at 05:44

## 2024-06-04 RX ADMIN — FENTANYL CITRATE 50 MCG: 50 INJECTION INTRAMUSCULAR; INTRAVENOUS at 10:18

## 2024-06-04 RX ADMIN — FENTANYL CITRATE 100 MCG: 50 INJECTION INTRAMUSCULAR; INTRAVENOUS at 07:36

## 2024-06-04 RX ADMIN — ACETAMINOPHEN 1000 MG: 10 INJECTION INTRAVENOUS at 11:52

## 2024-06-04 RX ADMIN — SODIUM CHLORIDE 125 ML/HR: 0.9 INJECTION, SOLUTION INTRAVENOUS at 05:40

## 2024-06-04 RX ADMIN — ROCURONIUM BROMIDE 50 MG: 10 INJECTION, SOLUTION INTRAVENOUS at 07:36

## 2024-06-04 RX ADMIN — ACETAMINOPHEN 1000 MG: 10 INJECTION INTRAVENOUS at 07:19

## 2024-06-04 RX ADMIN — FENTANYL CITRATE 50 MCG: 50 INJECTION INTRAMUSCULAR; INTRAVENOUS at 09:46

## 2024-06-04 RX ADMIN — ROCURONIUM BROMIDE 10 MG: 10 INJECTION, SOLUTION INTRAVENOUS at 07:55

## 2024-06-04 RX ADMIN — FAMOTIDINE 20 MG: 10 INJECTION INTRAVENOUS at 21:22

## 2024-06-04 NOTE — ANESTHESIA POSTPROCEDURE EVALUATION
Post-Op Assessment Note    CV Status:  Stable  Pain Score: 0    Pain management: adequate    Multimodal analgesia used between 6 hours prior to anesthesia start to PACU discharge    Mental Status:  Sleepy   Hydration Status:  Stable   PONV Controlled:  None   Airway Patency:  Patent  Two or more mitigation strategies used for obstructive sleep apnea   Post Op Vitals Reviewed: Yes    No anethesia notable event occurred.    Staff: CRNA               BP   124/78   Temp   97   Pulse  68   Resp   18   SpO2   96

## 2024-06-04 NOTE — PLAN OF CARE
Problem: PAIN - ADULT  Goal: Verbalizes/displays adequate comfort level or baseline comfort level  Description: Interventions:  - Encourage patient to monitor pain and request assistance  - Assess pain using appropriate pain scale  - Administer analgesics based on type and severity of pain and evaluate response  - Implement non-pharmacological measures as appropriate and evaluate response  - Consider cultural and social influences on pain and pain management  - Notify physician/advanced practitioner if interventions unsuccessful or patient reports new pain  Outcome: Progressing     Problem: INFECTION - ADULT  Goal: Absence or prevention of progression during hospitalization  Description: INTERVENTIONS:  - Assess and monitor for signs and symptoms of infection  - Monitor lab/diagnostic results  - Monitor all insertion sites, i.e. indwelling lines, tubes, and drains  - Monitor endotracheal if appropriate and nasal secretions for changes in amount and color  - [place] appropriate cooling/warming therapies per order  - Administer medications as ordered  - Instruct and encourage patient and family to use good hand hygiene technique  - Identify and instruct in appropriate isolation precautions for identified infection/condition  Outcome: Progressing     Problem: DISCHARGE PLANNING  Goal: Discharge to home or other facility with appropriate resources  Description: INTERVENTIONS:  - Identify barriers to discharge w/patient and caregiver  - Arrange for needed discharge resources and transportation as appropriate  - Identify discharge learning needs (meds, wound care, etc.)  - Arrange for interpretive services to assist at discharge as needed  - Refer to Case Management Department for coordinating discharge planning if the patient needs post-hospital services based on physician/advanced practitioner order or complex needs related to functional status, cognitive ability, or social support system  Outcome: Progressing      Problem: Knowledge Deficit  Goal: Patient/family/caregiver demonstrates understanding of disease process, treatment plan, medications, and discharge instructions  Description: Complete learning assessment and assess knowledge base.  Interventions:  - Provide teaching at level of understanding  - Provide teaching via preferred learning methods  Outcome: Progressing     Problem: GASTROINTESTINAL - ADULT  Goal: Minimal or absence of nausea and/or vomiting  Description: INTERVENTIONS:  - Administer IV fluids if ordered to ensure adequate hydration  - Maintain NPO status until nausea and vomiting are resolved  - Nasogastric tube if ordered  - Administer ordered antiemetic medications as needed  - Provide nonpharmacologic comfort measures as appropriate  - Advance diet as tolerated, if ordered  - Consider nutrition services referral to assist patient with adequate nutrition and appropriate food choices  Outcome: Progressing  Goal: Maintains or returns to baseline bowel function  Description: INTERVENTIONS:  - Assess bowel function  - Encourage oral fluids to ensure adequate hydration  - Administer IV fluids if ordered to ensure adequate hydration  - Administer ordered medications as needed  - Encourage mobilization and activity  - Consider nutritional services referral to assist patient with adequate nutrition and appropriate food choices  Outcome: Progressing     Problem: GENITOURINARY - ADULT  Goal: Maintains or returns to baseline urinary function  Description: INTERVENTIONS:  - Assess urinary function  - Encourage oral fluids to ensure adequate hydration if ordered  - Administer IV fluids as ordered to ensure adequate hydration  - Administer ordered medications as needed  - Offer frequent toileting  - Follow urinary retention protocol if ordered  Outcome: Progressing

## 2024-06-04 NOTE — ANESTHESIA PREPROCEDURE EVALUATION
Procedure:  ROBOTIC GASTRECTOMY SLEEVE, INTRAOP EGD (Abdomen)    Relevant Problems   CARDIO   (+) Intractable chronic migraine without aura and without status migrainosus      ENDO   (+) Type 2 diabetes mellitus without complication, without long-term current use of insulin (HCC)      HEMATOLOGY   (+) Iron deficiency anemia      NEURO/PSYCH  As per neurology note pt headaches are improving- neurology cleared her for surgery   (+) Anxiety   (+) Chronic intractable headache   (+) Depression   (+) Intractable chronic migraine without aura and without status migrainosus      PULMONARY   (+) Acute asthma exacerbation   (+) Moderate persistent asthma without complication   (+) SOB (shortness of breath)      Neurology/Sleep   (+) Idiopathic intracranial hypertension        Physical Exam    Airway    Mallampati score: II  TM Distance: >3 FB       Dental   No notable dental hx     Cardiovascular  Cardiovascular exam normal    Pulmonary  Pulmonary exam normal     Other Findings  post-pubertal.      Anesthesia Plan  ASA Score- 3     Anesthesia Type- general with ASA Monitors.         Additional Monitors:     Airway Plan: ETT.           Plan Factors-Exercise tolerance (METS): >4 METS.    Chart reviewed.        Patient is not a current smoker.  Patient instructed to abstain from smoking on day of procedure. Patient did not smoke on day of surgery.            Induction- intravenous.    Postoperative Plan-         Informed Consent- Anesthetic plan and risks discussed with patient.

## 2024-06-04 NOTE — RESTORATIVE TECHNICIAN NOTE
Restorative Technician Note      Patient Name: Cindy Bolden     Restorative Tech Visit Date: 06/04/24  Note Type: Mobility  Patient Position Upon Consult: Supine  Activity Performed: Ambulated; Range of motion  Patient Position at End of Consult: Supine; All needs within reach

## 2024-06-04 NOTE — INTERVAL H&P NOTE
H&P reviewed. After examining the patient I find no changes in the patients condition since the H&P had been written.    Vitals:    06/04/24 0550   BP: 120/75   Pulse: 84   Resp: 18   Temp: 98.3 °F (36.8 °C)   SpO2: 97%

## 2024-06-04 NOTE — OP NOTE
[facility]   [address] [place] [address], [address] Merged with [facility], [address] [phone] (Fax)      Operative Report  ROBOTIC GASTRECTOMY SLEEVE, INTRAOP EGD     Patient Name: Cindy Bolden    :  1985  MRN: [id number]  Patient Location: [facility] OR ROOM 07  Surgery Date : 2024  Surgeons:  Surgeons and Role:     * Renzo Zhao MD - Primary     * Gladys Cheatham PA-C - Assisting    Diagnosis:    Pre-Op Diagnosis Codes:  Morbid obesity (HCC) [E66.01]  Body mass index is 53.58 kg/m².  Diabetes mellitus (HCC) [E11.9]  Moderate persistent asthma [J45.40]  Benign intracranial hypertension [G93.2]  Esophageal reflux [K21.9]    Post-Op Diagnosis Codes:     * Morbid obesity (HCC) [E66.01]     * Diabetes mellitus (HCC) [E11.9]     * Moderate persistent asthma [J45.40]     * Benign intracranial hypertension [G93.2]     * Esophageal reflux [K21.9]     * Body mass index is 53.58 kg/m².      Procedure  Intraoperative Endoscopy  Laparoscopic Robotically Assisted Sleeve Gastrectomy    Specimen(s):  ID Type Source Tests Collected by Time Destination   1 : PORTION OF STOMACH Tissue Stomach TISSUE EXAM Renzo Zhao MD 2024 [id number]        Estimated Blood Loss:    Minimal * No LDAs found *    Anesthesia Type:     General    Operative Indications:    Morbid obesity (HCC) [E66.01]  Diabetes mellitus (HCC) [E11.9]  Moderate persistent asthma [J45.40]  Benign intracranial hypertension [G93.2]  Esophageal reflux [K21.9]  Body mass index is 53.58 kg/m².      Operative Findings:    Hepatomegaly    Complications:     None    Procedure and Technique:    INDICATION    Cindy Bolden is a 38 y.o. female with a Body mass index is 53.58 kg/m². and a long standing history of morbid obesity and inability to lose a significant amount of weight on its own.  This patient was found to be a good candidate to undergo a bariatric procedure upon being enrolled here at the [facility]  [place].    OPERATIVE TECHNIQUE    The patient was taken to the operating room and placed in a supine position. A dose of IV antibiotic prophylaxis that consisted of Ancef 3g was given.   Also 40 mg of subcutaneous Enoxaparin to prevent deep vein thrombosis were administered.  Sequential compression devices were placed on both lower extremities.    After satisfactory general anesthesia induction and endotracheal intubation was achieved, the extremities were placed and properly secured to prevent neuromuscular damage as best as possible.      Subsequently, the abdominal wall was prepped and draped in a surgical standard sterile fashion. After a timeout was done and the patient was properly identified and the type of procedure was confirmed  access to the peritoneal cavity was gained with standard Veress needle technique and an optical trocar. With this device, we were able to visualize the layers of the abdominal wall, and enter the peritoneal cavity under direct visualization. Pneumoperitoneum was then established with CO2 insufflation.     A four quadrant transversus abdominis plane block was performed under direct laparoscopic vision.    After this was completed four additional trocars were placed: an 8 mm in the right flank in the anterior axillary line, a 12-mm port was placed in the right flank midclavicular line, a 8-mm port was placed in the left flank  midclavicular line and another 12-mm port was placed in the left flank anterior axillary line lateral to the supraumbilical port.    The liver was significantly enlarged with a very prominent left lobe.  The Monserrat liver retractor was placed in the subxiphoid position through the use of a 5-mm trocar incision.  The patient was repositioned to a reverse Trendelenburg position and the robot was docked    With the trocars in place, the dissection was begun. We divided the gastrocolic ligament with the energy device to enter the lesser sac. We continued to  divide this ligament along the greater curvature of the stomach towards the angle of His. Special care was taken while dividing the short gastric vessels close to the spleen. This process was completely hemostatic.    We then turned to the creation of an elongated and thin gastric pouch. A 36 French calibration tube was placed by the anesthesia staff into the stomach under our laparoscopic surveillance. Once the tip of the bougie was confirmed to be next to the pylorus, serial firings of the laparoscopic stapler with 60-mm cartridges were utilized. We started in a point inferior to the incisura angularis and the Crows foot nerve looking to preserve the gastric emptying. This was 5 to 6 centimeters proximal to the pylorus.    We created a pouch based on the lesser curve, and in vertical orientation. We continued the vertical serial firings of the stapler to the angle of His gently cinching the bougie with our laparoscopic stapler looking to create a thin pouch. As we approached the fundus of the stomach and the angle of His, the stapler loads were changed appropriately according to the variable thickness of the tissue and were reinforced with buttressing material as needed.    This completely  the pouch from the remnant stomach.     We then turned our attention to the newly created pouch and examined it for bleeding or obvious defects on the staple lines and none were found.    The distal stomach pouch was occluded and an EGD as well as an air insufflation test was performed. Neither intraoperative bleeding nor leaks were detected.     I then covered the most proximal aspect of the staple line with a tongue of omentum in a Adan patch fashion and secured it in place with a single 2/0 Vicryl stitch.    The robot was undocked and the 12 mm right flank trocar site was dilated and the gastric remnant was externalized through it and passed off the surgical field to be sent to pathology.      The sponge, needle  and instrument count was reported complete.    The previously dilated trocar site was then closed with use of a suture closure device and a figure-of-eight with absorbable suture.  The pneumoperitoneum was evacuated using the [place] filter and smoke evacuator. The liver retractor and the remainder ports were then removed under direct laparoscopic visualization and no back bleeding was noted.     The skin incisions were all closed with 4-0 absorbable subcuticular suture.     The patient tolerated the procedure well, was extubated uneventfully and was transferred to the recovery room in stable condition.     I was present for the entire length of the procedure as the attending of record.  No qualified resident was available to assist.  The presence of an assistant was necessary for camera holding, traction and counter traction and for help with suturing and stapling in addition to performing the intraop-EGD.    Patient Disposition:    PACU     Signature: Renzo Zhao MD  Date: June 4, 2024  Time: 9:15 AM

## 2024-06-04 NOTE — PLAN OF CARE
Problem: PAIN - ADULT  Goal: Verbalizes/displays adequate comfort level or baseline comfort level  Description: Interventions:  - Encourage patient to monitor pain and request assistance  - Assess pain using appropriate pain scale  - Administer analgesics based on type and severity of pain and evaluate response  - Implement non-pharmacological measures as appropriate and evaluate response  - Consider cultural and social influences on pain and pain management  - Notify physician/advanced practitioner if interventions unsuccessful or patient reports new pain  Outcome: Progressing     Problem: INFECTION - ADULT  Goal: Absence or prevention of progression during hospitalization  Description: INTERVENTIONS:  - Assess and monitor for signs and symptoms of infection  - Monitor lab/diagnostic results  - Monitor all insertion sites, i.e. indwelling lines, tubes, and drains  - Monitor endotracheal if appropriate and nasal secretions for changes in amount and color  - [place] appropriate cooling/warming therapies per order  - Administer medications as ordered  - Instruct and encourage patient and family to use good hand hygiene technique  - Identify and instruct in appropriate isolation precautions for identified infection/condition  Outcome: Progressing     Problem: DISCHARGE PLANNING  Goal: Discharge to home or other facility with appropriate resources  Description: INTERVENTIONS:  - Identify barriers to discharge w/patient and caregiver  - Arrange for needed discharge resources and transportation as appropriate  - Identify discharge learning needs (meds, wound care, etc.)  - Arrange for interpretive services to assist at discharge as needed  - Refer to Case Management Department for coordinating discharge planning if the patient needs post-hospital services based on physician/advanced practitioner order or complex needs related to functional status, cognitive ability, or social support system  Outcome: Progressing      Problem: Knowledge Deficit  Goal: Patient/family/caregiver demonstrates understanding of disease process, treatment plan, medications, and discharge instructions  Description: Complete learning assessment and assess knowledge base.  Interventions:  - Provide teaching at level of understanding  - Provide teaching via preferred learning methods  Outcome: Progressing     Problem: GASTROINTESTINAL - ADULT  Goal: Minimal or absence of nausea and/or vomiting  Description: INTERVENTIONS:  - Administer IV fluids if ordered to ensure adequate hydration  - Maintain NPO status until nausea and vomiting are resolved  - Nasogastric tube if ordered  - Administer ordered antiemetic medications as needed  - Provide nonpharmacologic comfort measures as appropriate  - Advance diet as tolerated, if ordered  - Consider nutrition services referral to assist patient with adequate nutrition and appropriate food choices  Outcome: Progressing  Goal: Maintains or returns to baseline bowel function  Description: INTERVENTIONS:  - Assess bowel function  - Encourage oral fluids to ensure adequate hydration  - Administer IV fluids if ordered to ensure adequate hydration  - Administer ordered medications as needed  - Encourage mobilization and activity  - Consider nutritional services referral to assist patient with adequate nutrition and appropriate food choices  Outcome: Progressing     Problem: GENITOURINARY - ADULT  Goal: Maintains or returns to baseline urinary function  Description: INTERVENTIONS:  - Assess urinary function  - Encourage oral fluids to ensure adequate hydration if ordered  - Administer IV fluids as ordered to ensure adequate hydration  - Administer ordered medications as needed  - Offer frequent toileting  - Follow urinary retention protocol if ordered  Outcome: Progressing

## 2024-06-05 ENCOUNTER — TRANSITIONAL CARE MANAGEMENT (OUTPATIENT)
Dept: INTERNAL MEDICINE CLINIC | Facility: CLINIC | Age: 39
End: 2024-06-05

## 2024-06-05 VITALS
OXYGEN SATURATION: 94 % | HEIGHT: 63 IN | SYSTOLIC BLOOD PRESSURE: 124 MMHG | TEMPERATURE: 98.5 F | BODY MASS INDEX: 51.91 KG/M2 | RESPIRATION RATE: 18 BRPM | HEART RATE: 72 BPM | DIASTOLIC BLOOD PRESSURE: 72 MMHG | WEIGHT: 293 LBS

## 2024-06-05 LAB
ANION GAP SERPL CALCULATED.3IONS-SCNC: 6 MMOL/L (ref 4–13)
BASOPHILS # BLD AUTO: 0.03 THOUSANDS/ÂΜL (ref 0–0.1)
BASOPHILS NFR BLD AUTO: 0 % (ref 0–1)
BUN SERPL-MCNC: 4 MG/DL (ref 5–25)
CALCIUM SERPL-MCNC: 8.3 MG/DL (ref 8.4–10.2)
CHLORIDE SERPL-SCNC: 108 MMOL/L (ref 96–108)
CO2 SERPL-SCNC: 25 MMOL/L (ref 21–32)
CREAT SERPL-MCNC: 0.66 MG/DL (ref 0.6–1.3)
EOSINOPHIL # BLD AUTO: 0.18 THOUSAND/ÂΜL (ref 0–0.61)
EOSINOPHIL NFR BLD AUTO: 2 % (ref 0–6)
ERYTHROCYTE [DISTWIDTH] IN BLOOD BY AUTOMATED COUNT: 15.9 % (ref 11.6–15.1)
ERYTHROCYTE [DISTWIDTH] IN BLOOD BY AUTOMATED COUNT: 15.9 % (ref 11.6–15.1)
GFR SERPL CREATININE-BSD FRML MDRD: 112 ML/MIN/1.73SQ M
GLUCOSE SERPL-MCNC: 101 MG/DL (ref 65–140)
HCT VFR BLD AUTO: 36.4 % (ref 34.8–46.1)
HCT VFR BLD AUTO: 37.5 % (ref 34.8–46.1)
HGB BLD-MCNC: 11.6 G/DL (ref 11.5–15.4)
HGB BLD-MCNC: 11.9 G/DL (ref 11.5–15.4)
IMM GRANULOCYTES # BLD AUTO: 0.03 THOUSAND/UL (ref 0–0.2)
IMM GRANULOCYTES NFR BLD AUTO: 0 % (ref 0–2)
LYMPHOCYTES # BLD AUTO: 1.99 THOUSANDS/ÂΜL (ref 0.6–4.47)
LYMPHOCYTES NFR BLD AUTO: 24 % (ref 14–44)
MCH RBC QN AUTO: 27.5 PG (ref 26.8–34.3)
MCH RBC QN AUTO: 27.7 PG (ref 26.8–34.3)
MCHC RBC AUTO-ENTMCNC: 31.7 G/DL (ref 31.4–37.4)
MCHC RBC AUTO-ENTMCNC: 31.9 G/DL (ref 31.4–37.4)
MCV RBC AUTO: 87 FL (ref 82–98)
MCV RBC AUTO: 87 FL (ref 82–98)
MONOCYTES # BLD AUTO: 0.6 THOUSAND/ÂΜL (ref 0.17–1.22)
MONOCYTES NFR BLD AUTO: 7 % (ref 4–12)
NEUTROPHILS # BLD AUTO: 5.34 THOUSANDS/ÂΜL (ref 1.85–7.62)
NEUTS SEG NFR BLD AUTO: 67 % (ref 43–75)
NRBC BLD AUTO-RTO: 0 /100 WBCS
PLATELET # BLD AUTO: 315 THOUSANDS/UL (ref 149–390)
PLATELET # BLD AUTO: 321 THOUSANDS/UL (ref 149–390)
PMV BLD AUTO: 10.2 FL (ref 8.9–12.7)
PMV BLD AUTO: 9.8 FL (ref 8.9–12.7)
POTASSIUM SERPL-SCNC: 3.5 MMOL/L (ref 3.5–5.3)
RBC # BLD AUTO: 4.19 MILLION/UL (ref 3.81–5.12)
RBC # BLD AUTO: 4.33 MILLION/UL (ref 3.81–5.12)
SODIUM SERPL-SCNC: 139 MMOL/L (ref 135–147)
WBC # BLD AUTO: 8.17 THOUSAND/UL (ref 4.31–10.16)
WBC # BLD AUTO: 8.25 THOUSAND/UL (ref 4.31–10.16)

## 2024-06-05 PROCEDURE — 99024 POSTOP FOLLOW-UP VISIT: CPT | Performed by: STUDENT IN AN ORGANIZED HEALTH CARE EDUCATION/TRAINING PROGRAM

## 2024-06-05 PROCEDURE — 85025 COMPLETE CBC W/AUTO DIFF WBC: CPT | Performed by: STUDENT IN AN ORGANIZED HEALTH CARE EDUCATION/TRAINING PROGRAM

## 2024-06-05 PROCEDURE — 85027 COMPLETE CBC AUTOMATED: CPT | Performed by: PHYSICIAN ASSISTANT

## 2024-06-05 PROCEDURE — NC001 PR NO CHARGE: Performed by: STUDENT IN AN ORGANIZED HEALTH CARE EDUCATION/TRAINING PROGRAM

## 2024-06-05 PROCEDURE — 80048 BASIC METABOLIC PNL TOTAL CA: CPT | Performed by: PHYSICIAN ASSISTANT

## 2024-06-05 RX ORDER — PROMETHAZINE HYDROCHLORIDE 25 MG/ML
25 INJECTION, SOLUTION INTRAMUSCULAR; INTRAVENOUS EVERY 6 HOURS PRN
Status: DISCONTINUED | OUTPATIENT
Start: 2024-06-05 | End: 2024-06-05 | Stop reason: HOSPADM

## 2024-06-05 RX ORDER — METOCLOPRAMIDE HYDROCHLORIDE 5 MG/ML
10 INJECTION INTRAMUSCULAR; INTRAVENOUS EVERY 6 HOURS PRN
Status: DISCONTINUED | OUTPATIENT
Start: 2024-06-05 | End: 2024-06-05 | Stop reason: HOSPADM

## 2024-06-05 RX ADMIN — FAMOTIDINE 20 MG: 10 INJECTION INTRAVENOUS at 08:53

## 2024-06-05 RX ADMIN — ACETAMINOPHEN 1000 MG: 10 INJECTION INTRAVENOUS at 05:38

## 2024-06-05 RX ADMIN — ACETAMINOPHEN 1000 MG: 10 INJECTION INTRAVENOUS at 00:08

## 2024-06-05 RX ADMIN — KETOROLAC TROMETHAMINE 15 MG: 30 INJECTION, SOLUTION INTRAMUSCULAR at 00:11

## 2024-06-05 RX ADMIN — ENOXAPARIN SODIUM 40 MG: 40 INJECTION SUBCUTANEOUS at 10:55

## 2024-06-05 NOTE — PROGRESS NOTES
"Progress Note - Bariatric Surgery   Cindy Bolden 38 y.o. female MRN: [id number]  Unit/Bed#: E5 -01 Encounter: [id number]      Subjective/Objective     Subjective:  Patient with a history of obesity POD1 s/p robotic sleeve gastrectomy .  Patient denies fevers, chills, sweats, SOB, CP, calf pain.  Pain adequately controlled on oral pain medication.  Ambulating without assistance, voiding well, and using incentive spirometer.  Tolerating liquid diet without nausea or vomiting today.  Vital signs stable.    Objective:    /72 (BP Location: Left arm)   Pulse 72   Temp 98.5 °F (36.9 °C) (Oral)   Resp 18   Ht 5' 3\" (1.6 m)   Wt (!) 137 kg (302 lb 7.5 oz)   LMP 05/31/2024 (Approximate) Comment: Hx of tubal ligation per pt  SpO2 94%   BMI 53.58 kg/m²       Intake/Output Summary (Last 24 hours) at 6/5/2024 0833  Last data filed at 6/4/2024 2300  Gross per 24 hour   Intake 1500 ml   Output 850 ml   Net 650 ml       Invasive Devices       Peripheral Intravenous Line  Duration             Peripheral IV 06/04/24 Left;Ventral (anterior) Forearm 1 day                    ROS: 10-point system completed. All negative except see HPI.    Physical Exam    General Appearance:    Alert, cooperative, no distress, appears stated age   Head:    Normocephalic, without obvious abnormality, atraumatic   Lungs:     Respirations unlabored   Heart:    Regular rate and rhythm   Abdomen:     Soft, appropriate tenderness, no masses, no organomegaly, non-distended   Extremities:   Extremities normal, atraumatic, no cyanosis or edema   Neurologic:  Incision:  Psych:   Normal strength and sensation    Clean, dry, and intact    Normal mood and affect       Lab, Imaging and other studies:CBC:   Lab Results   Component Value Date    WBC 8.25 06/05/2024    HGB 11.6 06/05/2024    HCT 36.4 06/05/2024    MCV 87 06/05/2024     06/05/2024    RBC 4.19 06/05/2024    MCH 27.7 06/05/2024    MCHC 31.9 06/05/2024    RDW 15.9 (H) " 06/05/2024    MPV 10.2 06/05/2024        VTE Mechanical Prophylaxis: sequential compression device    Assessment/Plan  1)  Patient with a history of Obesity s/p robotic Sleeve Gastrectomy with stable post op course.  Patient afebrile and hemodynamically stable.     - Encourage PO fluids  - Recommend ambulation, use of SCDs when not ambulating, and incentive spirometry.   - Will repeat labs, if stable, ok for loevnox  - Plan to D/C patient home today pending anticipated progression    Plan of care was discussed with patient.  Care plan discussed with Dr. Cece Mckee MD  Bariatric Surgery  6/5/2024  8:33 AM

## 2024-06-05 NOTE — PLAN OF CARE
Problem: PAIN - ADULT  Goal: Verbalizes/displays adequate comfort level or baseline comfort level  Description: Interventions:  - Encourage patient to monitor pain and request assistance  - Assess pain using appropriate pain scale  - Administer analgesics based on type and severity of pain and evaluate response  - Implement non-pharmacological measures as appropriate and evaluate response  - Consider cultural and social influences on pain and pain management  - Notify physician/advanced practitioner if interventions unsuccessful or patient reports new pain  6/5/2024 1037 by Melchor Verduzco RN  Outcome: Adequate for Discharge  6/5/2024 0717 by Melchor Verduzco RN  Outcome: Progressing     Problem: INFECTION - ADULT  Goal: Absence or prevention of progression during hospitalization  Description: INTERVENTIONS:  - Assess and monitor for signs and symptoms of infection  - Monitor lab/diagnostic results  - Monitor all insertion sites, i.e. indwelling lines, tubes, and drains  - Monitor endotracheal if appropriate and nasal secretions for changes in amount and color  - [place] appropriate cooling/warming therapies per order  - Administer medications as ordered  - Instruct and encourage patient and family to use good hand hygiene technique  - Identify and instruct in appropriate isolation precautions for identified infection/condition  6/5/2024 1037 by Melchor Verduzco RN  Outcome: Adequate for Discharge  6/5/2024 0717 by Melchor Verduzco RN  Outcome: Progressing     Problem: DISCHARGE PLANNING  Goal: Discharge to home or other facility with appropriate resources  Description: INTERVENTIONS:  - Identify barriers to discharge w/patient and caregiver  - Arrange for needed discharge resources and transportation as appropriate  - Identify discharge learning needs (meds, wound care, etc.)  - Arrange for interpretive services to assist at discharge as needed  - Refer to Case Management Department for coordinating  discharge planning if the patient needs post-hospital services based on physician/advanced practitioner order or complex needs related to functional status, cognitive ability, or social support system  6/5/2024 1037 by Melchor Verduzco RN  Outcome: Adequate for Discharge  6/5/2024 0717 by Melchor Verduzco RN  Outcome: Progressing     Problem: Knowledge Deficit  Goal: Patient/family/caregiver demonstrates understanding of disease process, treatment plan, medications, and discharge instructions  Description: Complete learning assessment and assess knowledge base.  Interventions:  - Provide teaching at level of understanding  - Provide teaching via preferred learning methods  6/5/2024 1037 by Melchor Verduzco RN  Outcome: Adequate for Discharge  6/5/2024 0717 by Melchor Verduzco RN  Outcome: Progressing     Problem: GASTROINTESTINAL - ADULT  Goal: Minimal or absence of nausea and/or vomiting  Description: INTERVENTIONS:  - Administer IV fluids if ordered to ensure adequate hydration  - Maintain NPO status until nausea and vomiting are resolved  - Nasogastric tube if ordered  - Administer ordered antiemetic medications as needed  - Provide nonpharmacologic comfort measures as appropriate  - Advance diet as tolerated, if ordered  - Consider nutrition services referral to assist patient with adequate nutrition and appropriate food choices  6/5/2024 1037 by Melchor Verduzco RN  Outcome: Adequate for Discharge  6/5/2024 0717 by Melchor Verduzco RN  Outcome: Progressing  Goal: Maintains or returns to baseline bowel function  Description: INTERVENTIONS:  - Assess bowel function  - Encourage oral fluids to ensure adequate hydration  - Administer IV fluids if ordered to ensure adequate hydration  - Administer ordered medications as needed  - Encourage mobilization and activity  - Consider nutritional services referral to assist patient with adequate nutrition and appropriate food choices  6/5/2024 1037 by Melchor  KENAN Verduzco  Outcome: Adequate for Discharge  6/5/2024 0717 by Melchor Verduzco RN  Outcome: Progressing     Problem: GENITOURINARY - ADULT  Goal: Maintains or returns to baseline urinary function  Description: INTERVENTIONS:  - Assess urinary function  - Encourage oral fluids to ensure adequate hydration if ordered  - Administer IV fluids as ordered to ensure adequate hydration  - Administer ordered medications as needed  - Offer frequent toileting  - Follow urinary retention protocol if ordered  6/5/2024 1037 by Melchor Verduzco RN  Outcome: Adequate for Discharge  6/5/2024 0717 by Melchor Verduzco RN  Outcome: Progressing

## 2024-06-05 NOTE — PLAN OF CARE
Problem: PAIN - ADULT  Goal: Verbalizes/displays adequate comfort level or baseline comfort level  Description: Interventions:  - Encourage patient to monitor pain and request assistance  - Assess pain using appropriate pain scale  - Administer analgesics based on type and severity of pain and evaluate response  - Implement non-pharmacological measures as appropriate and evaluate response  - Consider cultural and social influences on pain and pain management  - Notify physician/advanced practitioner if interventions unsuccessful or patient reports new pain  Outcome: Progressing     Problem: INFECTION - ADULT  Goal: Absence or prevention of progression during hospitalization  Description: INTERVENTIONS:  - Assess and monitor for signs and symptoms of infection  - Monitor lab/diagnostic results  - Monitor all insertion sites, i.e. indwelling lines, tubes, and drains  - Monitor endotracheal if appropriate and nasal secretions for changes in amount and color  - [place] appropriate cooling/warming therapies per order  - Administer medications as ordered  - Instruct and encourage patient and family to use good hand hygiene technique  - Identify and instruct in appropriate isolation precautions for identified infection/condition  Outcome: Progressing     Problem: DISCHARGE PLANNING  Goal: Discharge to home or other facility with appropriate resources  Description: INTERVENTIONS:  - Identify barriers to discharge w/patient and caregiver  - Arrange for needed discharge resources and transportation as appropriate  - Identify discharge learning needs (meds, wound care, etc.)  - Arrange for interpretive services to assist at discharge as needed  - Refer to Case Management Department for coordinating discharge planning if the patient needs post-hospital services based on physician/advanced practitioner order or complex needs related to functional status, cognitive ability, or social support system  Outcome: Progressing      Problem: Knowledge Deficit  Goal: Patient/family/caregiver demonstrates understanding of disease process, treatment plan, medications, and discharge instructions  Description: Complete learning assessment and assess knowledge base.  Interventions:  - Provide teaching at level of understanding  - Provide teaching via preferred learning methods  Outcome: Progressing     Problem: GASTROINTESTINAL - ADULT  Goal: Minimal or absence of nausea and/or vomiting  Description: INTERVENTIONS:  - Administer IV fluids if ordered to ensure adequate hydration  - Maintain NPO status until nausea and vomiting are resolved  - Nasogastric tube if ordered  - Administer ordered antiemetic medications as needed  - Provide nonpharmacologic comfort measures as appropriate  - Advance diet as tolerated, if ordered  - Consider nutrition services referral to assist patient with adequate nutrition and appropriate food choices  Outcome: Progressing  Goal: Maintains or returns to baseline bowel function  Description: INTERVENTIONS:  - Assess bowel function  - Encourage oral fluids to ensure adequate hydration  - Administer IV fluids if ordered to ensure adequate hydration  - Administer ordered medications as needed  - Encourage mobilization and activity  - Consider nutritional services referral to assist patient with adequate nutrition and appropriate food choices  Outcome: Progressing     Problem: GENITOURINARY - ADULT  Goal: Maintains or returns to baseline urinary function  Description: INTERVENTIONS:  - Assess urinary function  - Encourage oral fluids to ensure adequate hydration if ordered  - Administer IV fluids as ordered to ensure adequate hydration  - Administer ordered medications as needed  - Offer frequent toileting  - Follow urinary retention protocol if ordered  Outcome: Progressing

## 2024-06-05 NOTE — DISCHARGE INSTR - AVS FIRST PAGE
Bariatric/Weight Loss Surgery  Hospital Discharge Instructions  ACTIVITY:  Progress as feels comfortable - a good rule is:  if you are doing something and it begins to hurt, stop doing the activity. Walk every hour while at home.  You may walk stairs if you do so slowly  You may shower 48 hours after surgery.  Use your incentive spirometer 10 times per hour while awake for 1 week  Do NOT drive for 48 hours after surgery. No driving 24 hours after taking certain prescription pain medications . Examples of such medication are Percocet, Darvocet, Oxycodone, Tylenol #3, and Tylenol with Codeine.     DIET  Stay on a liquid diet for 7 days after your surgery date, sipping slowly. Refer to your manual for examples of choices. Remember to keep your liquids sugar free or low calorie. You may have protein drinks. Make sure to drink 48 to 64 ounces per day of fluids.   You may advance to a pureed diet one week after surgery as instructed by your diet progression pamphlet. Once you get approval from your surgeon at your first post operative visit you may advance to the soft diet.     MEDICATIONS:  The abdominal nerve block will wear off during the first 1-2 days that you are home, and you may become sore. Continue to take your Tylenol and your pain medication as instructed.   Start vitamins and minerals when you get home.   Anti-acid Medication as per prescription.  Other medications as indicated on the Physician Patient Discharge Instructions form given to you at the time of discharge.  Make sure that you are splitting your pill or tablet medications in halves or fourths or even crushing them before you take them. Capsules should be opened and mixed with water or jello. You need to do this for at least 4 weeks after surgery. Eventually you will be able to take your medications the regular way as they were prescribed.   You will need to consult with your Family Doctor in regards to all your prescribed medication, particularly  those for blood pressure and diabetes.  As you lose weight, medical conditions may change, requiring an alteration or elimination of the drug dose.   DO NOT TAKE BIRTH CONTROL(BC) MEDICATIONS, INSERT BC VAGINAL RINGS, OR PLACE IUD OR ANY OTHER BC METHODS UNTIL 31 DAYS FROM DAY OF DISCHARGE FROM HOSPITAL. THIS PLACES YOU AT HIGH RISK FOR A POTENTIALLY LIFE THREATENING BLOOD CLOT. Remember to always use barrier methods for birth control and speak to your GYN about using two forms of birth control to start 31 days after surgery. It is very important to avoid pregnancy until at least 18-24 months after surgery.       INCISION CARE  You may shower and get incisions wet 2 days after surgery. No soaking tub baths or swimming for 30 days after surgery. Keep abdominal area and incisions clean. Use soap and water to create a good lather and rinse off. Do not scrub incisions.   If you have a drain, empty the drain as the nurses instructed.    FOLLOW-UP APPOINTMENT should be made for one week after discharge. Call surgeon’s office at [phone] to schedule an appointment.    CALL YOUR DOCTOR FOR:  pain not controlled by pain medications, a temperature greater than 101.5° F, any increase or change in drainage or redness from any incision, any vomiting or inability to keep liquids down, shortness of breath, shoulder pain, or bleeding

## 2024-06-05 NOTE — DISCHARGE SUMMARY
Discharge Summary - Cindy Bolden 38 y.o. female MRN: [id number]    Unit/Bed#: E5 -01 Encounter: [id number]      Pre-Operative Diagnosis: Pre-Op Diagnosis Codes:      * Morbid obesity (HCC) [E66.01]     * Diabetes mellitus (HCC) [E11.9]     * Moderate persistent asthma [J45.40]     * Benign intracranial hypertension [G93.2]     * Esophageal reflux [K21.9]    Post-Operative Diagnosis: Post-Op Diagnosis Codes:     * Morbid obesity (HCC) [E66.01]     * Diabetes mellitus (HCC) [E11.9]     * Moderate persistent asthma [J45.40]     * Benign intracranial hypertension [G93.2]     * Esophageal reflux [K21.9]    Procedures Performed:  Procedure(s):  ROBOTIC GASTRECTOMY SLEEVE, INTRAOP EGD    Surgeon: Renzo Zhao MD    See H & P for full details of admission and Operative Note for full details of operations performed.     Patient tolerated surgery well without complications. In the morning postoperative Day 1, the patient had mild nausea and abdominal pain. Tolerated a clear liquid diet without vomiting. Able to ambulate and voiding independently. Patient was deemed ready for discharge home.     Patient was seen and examined prior to discharge.      Provisions for Follow-Up Care:  See After Visit Summary/Discharge Instructions for information related to follow-up care and home orders.      Disposition: Home, in stable condition.     Planned Readmission: No    Discharge Medications:  See After Visit Summary/Discharge Instructions for reconciled discharge medications provided to patient and family.      Post Operative instructions: Reviewed with patient and/or family.    Signature:   Anival Mckee MD  Date: 6/5/2024 Time: 10:25 AM

## 2024-06-05 NOTE — UTILIZATION REVIEW
"Initial Clinical Review    Elective 89325  surgical procedure  Age/Sex: 38 y.o. female  Surgery Date:  6/4/2024   Procedure: Intraoperative Endoscopy  Laparoscopic Robotically Assisted Sleeve Gastrectomy  Anesthesia: General   Operative Findings: Hepatomegaly     POD#1 Progress Note: Pain controlled on po meds. Ambulatory.  Tolerating clear liquid diet.     Admission Orders: Date/Time/Statement:   Admission Orders (From admission, onward)       Ordered        06/04/24 0747  Inpatient Admission  Once                          Orders Placed This Encounter   Procedures    Inpatient Admission     Standing Status:   Standing     Number of Occurrences:   1     Order Specific Question:   Level of Care     Answer:   Med Surg [16]     Order Specific Question:   Estimated length of stay     Answer:   Inpatient Only Surgery     Vital Signs: /72 (BP Location: Left arm)   Pulse 72   Temp 98.5 °F (36.9 °C) (Oral)   Resp 18   Ht 5' 3\" (1.6 m)   Wt (!) 137 kg (302 lb 7.5 oz)   LMP 05/31/2024 (Approximate) Comment: Hx of tubal ligation per pt  SpO2 94%   BMI 53.58 kg/m²     Pertinent Labs/Diagnostic Test Results:         Results from last 7 days   Lab Units 06/05/24  0856 06/05/24  0438   WBC Thousand/uL 8.17 8.25   HEMOGLOBIN g/dL 11.9 11.6   HEMATOCRIT % 37.5 36.4   PLATELETS Thousands/uL 321 315   TOTAL NEUT ABS Thousands/µL 5.34  --          Results from last 7 days   Lab Units 06/05/24  0438   SODIUM mmol/L 139   POTASSIUM mmol/L 3.5   CHLORIDE mmol/L 108   CO2 mmol/L 25   ANION GAP mmol/L 6   BUN mg/dL 4*   CREATININE mg/dL 0.66   EGFR ml/min/1.73sq m 112   CALCIUM mg/dL 8.3*         Results from last 7 days   Lab Units 06/04/24  0956 06/04/24  0542   POC GLUCOSE mg/dl 134 181*     Results from last 7 days   Lab Units 06/05/24  0438   GLUCOSE RANDOM mg/dL 101         Diet: Bariatric clears  Mobility: Ambulate and OOB  DVT Prophylaxis: SCDs, enoxaparin    Medications/Pain Control:   Scheduled " Medications:  acetaminophen, 1,000 mg, Intravenous, Q6H TRAVIS  enoxaparin, 40 mg, Subcutaneous, Q24H  famotidine, 20 mg, Intravenous, BID  gabapentin, 100 mg, Oral, TID      Continuous IV Infusions:  lactated ringers, 100 mL/hr, Intravenous, Continuous  sodium chloride, 125 mL/hr, Intravenous, Continuous      PRN Meds:  diphenhydrAMINE, 25 mg, Oral, Q8H PRN  metoclopramide, 10 mg, Intravenous, Q6H PRN  morphine injection, 4 mg, Intravenous, Q4H PRN  ondansetron, 4 mg, Intravenous, Q6H PRN  oxyCODONE, 10 mg, Oral, Q4H PRN  oxyCODONE, 5 mg, Oral, Q4H PRN  phenol, 2 spray, Mouth/Throat, Q2H PRN  promethazine, 25 mg, Intravenous, Q6H PRN  simethicone, 80 mg, Oral, 4x Daily PRN        Network Utilization Review Department  ATTENTION: Please call with any questions or concerns to [phone] and carefully listen to the prompts so that you are directed to the right person. All voicemails are confidential.   For Discharge needs, contact Care Management DC Support Team at [phone] opt. 2  Send all requests for admission clinical reviews, approved or denied determinations and any other requests to dedicated fax number below belonging to the campus where the patient is receiving treatment. List of dedicated fax numbers for the Facilities:  FACILITY NAME UR FAX NUMBER   ADMISSION DENIALS (Administrative/Medical Necessity) [phone]   DISCHARGE SUPPORT TEAM (NETWORK) [phone]   PARENT CHILD HEALTH (Maternity/NICU/Pediatrics) [phone]   [facility] [phone]   [facility] [phone]   [facility] [phone]   [facility] [phone]   [facility] [phone]   [facility] [phone]   [facility] [phone]   [facility] [phone]   [facility] [phone]    [facility] [phone]   [facility] [phone]   [facility] [phone]

## 2024-06-06 ENCOUNTER — TELEPHONE (OUTPATIENT)
Dept: MEDSURG UNIT | Facility: HOSPITAL | Age: 39
End: 2024-06-06

## 2024-06-06 PROCEDURE — 88307 TISSUE EXAM BY PATHOLOGIST: CPT | Performed by: PATHOLOGY

## 2024-06-06 NOTE — UTILIZATION REVIEW
NOTIFICATION OF ADMISSION DISCHARGE   This is a Notification of Discharge from [facility]. Please be advised that this patient has been discharge from our facility. Below you will find the admission and discharge date and time including the patient’s disposition.   UTILIZATION REVIEW CONTACT:  Kayli Tracy MA  Utilization   Network Utilization Review Department  Phone: [phone] x carefully listen to the prompts. All voicemails are confidential.  Email: [email or web address]     ADMISSION INFORMATION  PRESENTATION DATE: 6/4/2024  5:08 AM  OBERVATION ADMISSION DATE:   INPATIENT ADMISSION DATE: 6/4/24  7:47 AM   DISCHARGE DATE: 6/5/2024 11:42 AM   DISPOSITION:Home/Self Care    Network Utilization Review Department  ATTENTION: Please call with any questions or concerns to [phone] and carefully listen to the prompts so that you are directed to the right person. All voicemails are confidential.   For Discharge needs, contact Care Management DC Support Team at [phone] opt. 2  Send all requests for admission clinical reviews, approved or denied determinations and any other requests to dedicated fax number below belonging to the campus where the patient is receiving treatment. List of dedicated fax numbers for the Facilities:  FACILITY NAME UR FAX NUMBER   ADMISSION DENIALS (Administrative/Medical Necessity) [phone]   DISCHARGE SUPPORT TEAM ([facility]) [phone]   PARENT CHILD HEALTH (Maternity/NICU/Pediatrics) [phone]   [facility] [phone]   [facility] [phone]   [facility] [phone]   [facility] [phone]   [facility] [phone]   [facility] [phone]   [facility] [phone]   [facility] [phone]    [facility] [phone]   [facility] [phone]   [facility] [phone]   [facility] [place] [phone]

## 2024-06-07 ENCOUNTER — TELEPHONE (OUTPATIENT)
Dept: MEDSURG UNIT | Facility: HOSPITAL | Age: 39
End: 2024-06-07

## 2024-06-07 NOTE — TELEPHONE ENCOUNTER
Post op follow up phone call completed.  Pt is sipping liquids and has consumed about 20 per day since being home.  Using IS as instructed, reinforced importance of using IS to help prevent pneumonia. Getting spirometer up from 1000 to 1750.  Ambulating about home without difficulty.  Pain persists on right side but pt didn't take tylenol last night but will restart today.  Reaffirmed examples of liquid diet over the next week.  Started miralax, passing gas, no BM yet.  Pt stated understanding about discharge instructions and medication adjustments.  Tolerating self administration of Lovenox injections without difficulty.  Follow up appt with surgeon scheduled for next week.   Instructed to call with any additional questions or concerns.

## 2024-06-14 ENCOUNTER — CLINICAL SUPPORT (OUTPATIENT)
Dept: BARIATRICS | Facility: CLINIC | Age: 39
End: 2024-06-14

## 2024-06-14 ENCOUNTER — OFFICE VISIT (OUTPATIENT)
Dept: BARIATRICS | Facility: CLINIC | Age: 39
End: 2024-06-14

## 2024-06-14 VITALS
HEART RATE: 98 BPM | SYSTOLIC BLOOD PRESSURE: 140 MMHG | DIASTOLIC BLOOD PRESSURE: 90 MMHG | TEMPERATURE: 98 F | BODY MASS INDEX: 50.94 KG/M2 | HEIGHT: 63 IN | WEIGHT: 287.5 LBS

## 2024-06-14 DIAGNOSIS — E66.01 CLASS 3 SEVERE OBESITY DUE TO EXCESS CALORIES WITH SERIOUS COMORBIDITY AND BODY MASS INDEX (BMI) OF 50.0 TO 59.9 IN ADULT (HCC): Primary | ICD-10-CM

## 2024-06-14 PROCEDURE — 99024 POSTOP FOLLOW-UP VISIT: CPT | Performed by: SURGERY

## 2024-06-14 PROCEDURE — RECHECK: Performed by: DIETITIAN, REGISTERED

## 2024-06-14 NOTE — PROGRESS NOTES
"  POST OP UP VISIT - BARIATRIC SURGERY  Cindy Bolden 38 y.o. female MRN: [id number]  Unit/Bed#:  Encounter: [id number]      HPI:  Cindy Bolden is a 38 y.o. female status post laparoscopic robotic sleeve gastrectomy.      Review of Systems   All other systems reviewed and are negative.      Historical Information   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     Past Surgical History:   Procedure Laterality Date     SECTION      EGD      FL LUMBAR PUNCTURE DIAGNOSTIC  2022    AK LAPS GSTRC RSTRICTIV PX LONGITUDINAL GASTRECTOMY N/A 2024    Procedure: ROBOTIC GASTRECTOMY SLEEVE, INTRAOP EGD;  Surgeon: Renzo Zaho MD;  Location: [facility] OR;  Service: Bariatrics    TUBAL LIGATION       Social History   Social History     Substance and Sexual Activity   Alcohol Use Not Currently    Comment: occasional use- avg use couple times a month     Social History     Substance and Sexual Activity   Drug Use No    Comment: Denies any drug use per pt     Social History     Tobacco Use   Smoking Status Never   Smokeless Tobacco Never   Tobacco Comments    Never a smoker or use of tobacco products per pt      Family History: non-contributory    Meds/Allergies   all medications and allergies reviewed  No Known Allergies    Objective       Current Vitals:   Blood Pressure: 140/90 (24 1302)  Pulse: 98 (24 1302)  Temperature: 98 °F (36.7 °C) (24 1302)  Temp Source: Tympanic (24 1302)  Height: 5' 3.2\" (160.5 cm) (24 1302)  Weight - Scale: 130 kg (287 lb 8 oz) (24 1302)      Invasive Devices       None                   Physical Exam  Vitals reviewed.   Constitutional:       General: She is not in acute distress.     Appearance: She is well-developed. She is not diaphoretic. "   HENT:      Head: Normocephalic and atraumatic.      Right Ear: External ear normal.      Left Ear: External ear normal.   Eyes:      General: No scleral icterus.        Right eye: No discharge.         Left eye: No discharge.      Conjunctiva/sclera: Conjunctivae normal.   Abdominal:      General: Bowel sounds are normal. There is no distension.      Palpations: Abdomen is soft. There is no mass.      Tenderness: There is no abdominal tenderness. There is no guarding or rebound.      Comments: Abdomen is obese, soft and benign.  Laparoscopic incisions are healing well without any evidence of infection.  Mild ecchymosis at the injection sites.     Neurological:      Mental Status: She is alert and oriented to person, place, and time.   Psychiatric:         Mood and Affect: Mood and affect normal.         Behavior: Behavior normal.         Thought Content: Thought content normal.         Judgment: Judgment normal.               Assessment/PLAN:    38 y.o. female status post laparoscopic sleeve gastrectomy done in June 4, 2024.    Doing well post op. No major issues.    The low molecular weight heparin prophylactic treatment will be completed in a couple days.    The pathology report was reviewed with the patient and the results were within normal limits.     Pathology, and Other Studies: I have personally reviewed pertinent reports.    Lab Results   Component Value Date    FINALDX  06/04/2024     A.  Portion of stomach:     - Fundic type gastric mucosa with focal polypoid foveolar hyperplasia and no further significant pathologic         abnormalities.     - No H. pylori organisms identified on H&E stained slides.     - Clinical history of morbid obesity.             Increase physical activity as tolerated and as instructed.  Advance diet as instructed by our dietitians today and as indicated in the binder.    Follow up in one month as scheduled.          Renzo Zhao MD  6/14/2024  1:08 PM      .

## 2024-06-14 NOTE — PROGRESS NOTES
Weight Management Nutrition Note      Bariatric Surgeon: Dr. Renzo Zhao    Surgery: Vertical Sleeve Gastrectomy    Class: first post op note    Topics discussed today include:     fluid goals post op, protein goals post op, constipation, chew food well, diet progression, protein supplems, vitamin/mineral supplements, calcium supplements, and iron supplements    Patient was able to verbalize basic diet (protein, fluid, vitamin and mineral) recommendations and possible nutrition-related complications. Yes

## 2024-06-17 ENCOUNTER — NURSE TRIAGE (OUTPATIENT)
Age: 39
End: 2024-06-17

## 2024-06-17 NOTE — TELEPHONE ENCOUNTER
Pt of Dr. Zhao s/p bariatric sx 6/4-  Calling with concern of open incision on right side abdomen that she noticed 2 days ago. Pt states this area is open to the size of a pencil eraser and leaks brown/yellow fluid. Pt states she has been leaving this area open to air. Denies pain.     Advised pt to sent in ebindle message of photo of the wound and to keep the area clean, dry, covered.   Pt agreeable.     Please advise.

## 2024-07-08 ENCOUNTER — TELEPHONE (OUTPATIENT)
Age: 39
End: 2024-07-08

## 2024-09-18 ENCOUNTER — TELEPHONE (OUTPATIENT)
Age: 39
End: 2024-09-18

## 2024-09-18 NOTE — TELEPHONE ENCOUNTER
Patient had to reschedule her visit with BRENDA Arrieta today at 10am due to no LYFT ride was scheduled for her. Patient is now scheduled for 10/09/24 at 10am and is requesting a LYFT ride be arranged for her.

## 2024-09-18 NOTE — TELEPHONE ENCOUNTER
BRYM to inform patient that a lyft ride will be placed for her appt on 10/9 with Rupinder. [facility] office info [phone]

## 2024-09-18 NOTE — TELEPHONE ENCOUNTER
BRYM to inform patient that a lyft ride will be placed for her appt on 10/9 with Rupinder. [facility] office info [phone]

## 2024-10-07 ENCOUNTER — TELEPHONE (OUTPATIENT)
Dept: BARIATRICS | Facility: CLINIC | Age: 39
End: 2024-10-07

## 2024-10-07 NOTE — TELEPHONE ENCOUNTER
LVM to inform patient that a lyft ride was set up for appt on 10/9 with a  time of 9:20 AM . Left office info to call back with any questions [phone]

## 2024-10-09 ENCOUNTER — OFFICE VISIT (OUTPATIENT)
Dept: BARIATRICS | Facility: CLINIC | Age: 39
End: 2024-10-09
Payer: COMMERCIAL

## 2024-10-09 VITALS
SYSTOLIC BLOOD PRESSURE: 120 MMHG | WEIGHT: 263.5 LBS | HEIGHT: 63 IN | TEMPERATURE: 97.8 F | BODY MASS INDEX: 46.69 KG/M2 | DIASTOLIC BLOOD PRESSURE: 78 MMHG | HEART RATE: 72 BPM

## 2024-10-09 DIAGNOSIS — K91.2 POSTSURGICAL MALABSORPTION: ICD-10-CM

## 2024-10-09 DIAGNOSIS — G93.2 IDIOPATHIC INTRACRANIAL HYPERTENSION: ICD-10-CM

## 2024-10-09 DIAGNOSIS — Z98.84 BARIATRIC SURGERY STATUS: ICD-10-CM

## 2024-10-09 DIAGNOSIS — E66.01 OBESITY, CLASS III, BMI 40-49.9 (MORBID OBESITY) (HCC): ICD-10-CM

## 2024-10-09 DIAGNOSIS — Z48.815 ENCOUNTER FOR SURGICAL AFTERCARE FOLLOWING SURGERY OF DIGESTIVE SYSTEM: Primary | ICD-10-CM

## 2024-10-09 PROCEDURE — 99214 OFFICE O/P EST MOD 30 MIN: CPT | Performed by: PHYSICIAN ASSISTANT

## 2024-10-09 NOTE — PROGRESS NOTES
Assessment/Plan:     Patient ID: Cindy Bolden is a 39 y.o. female.     Bariatric Surgery Status    -s/p Vertical Sleeve Gastrectomy with Dr. Zhao on 6/4/2024. Presents to the office today for 2nd postop. Doing well overall    Continue daily PPI     Continued/Maintain healthy weight loss with good nutrition intakes.  Adequate hydration with at least 64oz. fluid intake.  Follow diet as discussed.  Follow vitamin and mineral recommendations as reviewed with you.  Exercise as tolerated.    Colonoscopy referral made: n/a    Follow-up in 3 months . We kindly ask that your arrive 15 minutes before your scheduled appointment time with your provider to allow our staff to room you, get your vital signs and update your chart.    Get lab work done . Please call the office if you need a script.  It is recommended to check with your insurance BEFORE getting labs done to make sure they are covered by your policy.      Call our office if you have any problems with abdominal pain especially associated with fever, chills, nausea, vomiting or any other concerns.    All  Post-bariatric surgery patients should be aware that very small quantities of any alcohol can cause impairment and it is very possible not to feel the effect. The effect can be in the system for several hours.  It is also a stomach irritant.     It is advised to AVOID alcohol, Nonsteroidal antiinflammatory drugs (NSAIDS) and nicotine of all forms . Any of these can cause stomach irritation/pain.    Discussed the effects of alcohol on a bariatric patient and the increased impairment risk.     Keep up the good work!     Postsurgical Malabsorption   -At risk for malabsorption of vitamins/minerals secondary to malabsorption and restriction of intake from bariatric surgery  -NOT Currently taking adequate postop bariatric surgery vitamin supplementation- needs to repurchase'  -Next set of bariatric labs ordered for approximately 2 weeks  -Patient received education  about the importance of adhering to a lifelong supplementation regimen to avoid vitamin/mineral deficiencies      Diagnoses and all orders for this visit:    Encounter for surgical aftercare following surgery of digestive system  -     Zinc; Future  -     Vitamin D 25 hydroxy; Future  -     Vitamin B12; Future  -     Vitamin B1, whole blood; Future  -     Vitamin A; Future  -     PTH, intact; Future  -     CBC and Platelet; Future  -     Comprehensive metabolic panel; Future  -     Ferritin; Future  -     Folate; Future  -     TIBC Panel (incl. Iron, TIBC, % Iron Saturation); Future    Bariatric surgery status  -     Zinc; Future  -     Vitamin D 25 hydroxy; Future  -     Vitamin B12; Future  -     Vitamin B1, whole blood; Future  -     Vitamin A; Future  -     PTH, intact; Future  -     CBC and Platelet; Future  -     Comprehensive metabolic panel; Future  -     Ferritin; Future  -     Folate; Future  -     TIBC Panel (incl. Iron, TIBC, % Iron Saturation); Future    Postsurgical malabsorption  -     Zinc; Future  -     Vitamin D 25 hydroxy; Future  -     Vitamin B12; Future  -     Vitamin B1, whole blood; Future  -     Vitamin A; Future  -     PTH, intact; Future  -     CBC and Platelet; Future  -     Comprehensive metabolic panel; Future  -     Ferritin; Future  -     Folate; Future  -     TIBC Panel (incl. Iron, TIBC, % Iron Saturation); Future    Idiopathic intracranial hypertension  -     Zinc; Future  -     Vitamin D 25 hydroxy; Future  -     Vitamin B12; Future  -     Vitamin B1, whole blood; Future  -     Vitamin A; Future  -     PTH, intact; Future  -     CBC and Platelet; Future  -     Comprehensive metabolic panel; Future  -     Ferritin; Future  -     Folate; Future  -     TIBC Panel (incl. Iron, TIBC, % Iron Saturation); Future    Obesity, Class III, BMI 40-49.9 (morbid obesity) (HCC)         Subjective:      Patient ID: Cindy Bolden is a 39 y.o. female.    -s/p Vertical Sleeve Gastrectomy with  "Dr. Zhao on 6/4/2024. Presents to the office today for 2nd postop.Tolerating diet without issues; denies N/V, dysphagia, reflux.     Initial: 317  Current: 263  EWL: (Weight loss is ahead of schedule at this post surgical period.)  Steven: current   Current BMI is Body mass index is 46.38 kg/m².    Tolerating a regular diet-yes  Eating at least 60 grams of protein per day-yes  Following 30/60 minute rule with liquids-yes  Drinking at least 64 ounces of fluid per day-yes  Sufficient exercise-yes  Using nicotine-no  Using alcohol-no  Supplements:  none currently - ran out and needs to re-purchase     EWL is 54%, which places the patient ahead of schedule for expected post surgical weight loss at this time.     The following portions of the patient's history were reviewed and updated as appropriate: allergies, current medications, past family history, past medical history, past social history, past surgical history and problem list.    Review of Systems   Constitutional: Negative.    Respiratory: Negative.     Cardiovascular: Negative.    Gastrointestinal: Negative.    Neurological: Negative.    Psychiatric/Behavioral: Negative.           Objective:    /78   Pulse 72   Temp 97.8 °F (36.6 °C) (Tympanic)   Ht 5' 3.2\" (1.605 m)   Wt 120 kg (263 lb 8 oz)   BMI 46.38 kg/m²      Physical Exam  Vitals and nursing note reviewed.   Constitutional:       Appearance: Normal appearance. She is obese.   HENT:      Head: Normocephalic and atraumatic.   Eyes:      Extraocular Movements: Extraocular movements intact.   Cardiovascular:      Rate and Rhythm: Normal rate.   Pulmonary:      Effort: Pulmonary effort is normal.   Musculoskeletal:         General: Normal range of motion.      Cervical back: Normal range of motion.   Skin:     General: Skin is warm and dry.   Neurological:      General: No focal deficit present.      Mental Status: She is alert and oriented to person, place, and time.   Psychiatric:         Mood " and Affect: Mood normal.

## 2024-10-09 NOTE — PROGRESS NOTES
Date of surgery: 6/4/2024  Procedure: Sleeve  Performing surgeon: Dr. Zhao    Initial Weight -  317.5 lb  Current Weight -263.5 lb  Steven Weight - now  Total Body Weight Loss (EWL)- 54 %  EWL% - 31%  TWB % -17%

## 2024-10-09 NOTE — PATIENT INSTRUCTIONS
Follow-up in 3 months . We kindly ask that your arrive 15 minutes before your scheduled appointment time with your provider to allow our staff to room you, get your vital signs and update your chart.    Get lab work done . Please call the office if you need a script.  It is recommended to check with your insurance BEFORE getting labs done to make sure they are covered by your policy.      Call our office if you have any problems with abdominal pain especially associated with fever, chills, nausea, vomiting or any other concerns.    All  Post-bariatric surgery patients should be aware that very small quantities of any alcohol can cause impairment and it is very possible not to feel the effect. The effect can be in the system for several hours.  It is also a stomach irritant.     It is advised to AVOID alcohol, Nonsteroidal antiinflammatory drugs (NSAIDS) and nicotine of all forms . Any of these can cause stomach irritation/pain.    Discussed the effects of alcohol on a bariatric patient and the increased impairment risk.     Keep up the good work!

## 2024-10-15 ENCOUNTER — TELEPHONE (OUTPATIENT)
Dept: INTERNAL MEDICINE CLINIC | Facility: CLINIC | Age: 39
End: 2024-10-15

## 2024-10-25 ENCOUNTER — TELEPHONE (OUTPATIENT)
Age: 39
End: 2024-10-25

## 2024-10-25 NOTE — LETTER
October 29, 2024        Cindy Bolden  [address]  [place] [address]      Our office has been unsuccessful in trying to reach you by phone regarding:        ? Other:____transportation______________________________________________      Hope you are doing well.  I left you a few messages regarding your request for transportation assistance.  I know we worked on getting you approved for the [employer] in the past.  If you need assistance on figuring out routes, etc to take please feel free to call [employer] at [phone].  Perhaps you found a ride for your 1/30 apt .  If not and you would like to change providers so that you can be closer to home for [employer] transportation, or if you want to be added to waitlist with current provider in [place] office please let me know.  Please contact our office if you need further assistance at [phone] and follow the prompts.            Sincerely,      Halima Wan  Outpatient   [facility]  [phone]  [email or web address]

## 2024-10-29 NOTE — TELEPHONE ENCOUNTER
Left another message for patient requesting c/b to discuss if patient has gotten a ride for 1/30 apt or would like to change providers so can be closer to home for her active [employer] transportation, or if patient wants to be added to waitlist with current provider in [facility].  Informed patient in message would send UTR letter with information.

## 2024-11-13 ENCOUNTER — VBI (OUTPATIENT)
Dept: ADMINISTRATIVE | Facility: OTHER | Age: 39
End: 2024-11-13

## 2024-11-13 NOTE — TELEPHONE ENCOUNTER
11/13/24 2:43 PM     Chart reviewed for Diabetic Eye Exam ; nothing is submitted to the patient's insurance at this time.     Dayan Mendoza MA   [facility]

## 2024-12-11 ENCOUNTER — HOSPITAL ENCOUNTER (EMERGENCY)
Facility: HOSPITAL | Age: 39
Discharge: HOME | End: 2024-12-12
Attending: EMERGENCY MEDICINE
Payer: COMMERCIAL

## 2024-12-11 ENCOUNTER — APPOINTMENT (EMERGENCY)
Dept: RADIOLOGY | Facility: HOSPITAL | Age: 39
End: 2024-12-11
Payer: COMMERCIAL

## 2024-12-11 VITALS
SYSTOLIC BLOOD PRESSURE: 139 MMHG | OXYGEN SATURATION: 95 % | HEART RATE: 84 BPM | TEMPERATURE: 97.9 F | DIASTOLIC BLOOD PRESSURE: 95 MMHG | RESPIRATION RATE: 20 BRPM

## 2024-12-11 DIAGNOSIS — J06.9 UPPER RESPIRATORY INFECTION: Primary | ICD-10-CM

## 2024-12-11 PROCEDURE — 96372 THER/PROPH/DIAG INJ SC/IM: CPT

## 2024-12-11 PROCEDURE — 99284 EMERGENCY DEPT VISIT MOD MDM: CPT | Performed by: EMERGENCY MEDICINE

## 2024-12-11 PROCEDURE — 99283 EMERGENCY DEPT VISIT LOW MDM: CPT

## 2024-12-11 PROCEDURE — 71046 X-RAY EXAM CHEST 2 VIEWS: CPT

## 2024-12-11 RX ORDER — ACETAMINOPHEN 325 MG/1
650 TABLET ORAL ONCE
Status: COMPLETED | OUTPATIENT
Start: 2024-12-11 | End: 2024-12-11

## 2024-12-11 RX ORDER — ALBUTEROL SULFATE 90 UG/1
2 INHALANT RESPIRATORY (INHALATION) ONCE
Status: COMPLETED | OUTPATIENT
Start: 2024-12-11 | End: 2024-12-11

## 2024-12-11 RX ORDER — KETOROLAC TROMETHAMINE 30 MG/ML
15 INJECTION, SOLUTION INTRAMUSCULAR; INTRAVENOUS ONCE
Status: COMPLETED | OUTPATIENT
Start: 2024-12-11 | End: 2024-12-11

## 2024-12-11 RX ADMIN — ALBUTEROL SULFATE 2 PUFF: 90 AEROSOL, METERED RESPIRATORY (INHALATION) at 23:07

## 2024-12-11 RX ADMIN — KETOROLAC TROMETHAMINE 15 MG: 30 INJECTION, SOLUTION INTRAMUSCULAR; INTRAVENOUS at 23:07

## 2024-12-11 RX ADMIN — ACETAMINOPHEN 650 MG: 325 TABLET, FILM COATED ORAL at 23:07

## 2024-12-12 RX ORDER — AZITHROMYCIN 250 MG/1
TABLET, FILM COATED ORAL
Qty: 6 TABLET | Refills: 0 | Status: SHIPPED | OUTPATIENT
Start: 2024-12-12 | End: 2024-12-16

## 2024-12-12 NOTE — ED ATTENDING ATTESTATION
2024  IMarck MD, saw and evaluated the patient. I have discussed the patient with the resident/non-physician practitioner and agree with the resident's/non-physician practitioner's findings, Plan of Care, and MDM as documented in the resident's/non-physician practitioner's note, except where noted. All available labs and Radiology studies were reviewed.  I was present for key portions of any procedure(s) performed by the resident/non-physician practitioner and I was immediately available to provide assistance.       At this point I agree with the current assessment done in the Emergency Department.  I have conducted an independent evaluation of this patient a history and physical is as follows:      Final Diagnosis:  1. Upper respiratory infection      Chief Complaint   Patient presents with    Cough     Pt c/o cough and fevers. Pt states she has been sick the last three days and is coughing up green mucous today.            A:  -39-year-old female who presents with cough and fever.      P:  -Chest x-ray to evaluate for pneumonia.  -Albuterol MDI to help with cough.  -P.o. Tylenol, IM Toradol for symptoms.      H:    39-year-old female who presents with cough, body aches, fevers.  Ongoing for the past 3 days.  States that she is coughing up green mucus.  2 daughters at home with similar symptoms.      PMH:  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       PSH:  Past Surgical History:   Procedure Laterality Date     SECTION      EGD      FL LUMBAR PUNCTURE DIAGNOSTIC  2022    MS LAPS GSTRC RSTRICTIV PX LONGITUDINAL GASTRECTOMY N/A 2024    Procedure: ROBOTIC GASTRECTOMY SLEEVE, INTRAOP EGD;  Surgeon: Renzo Zhao MD;  Location: [facility];  Service: Bariatrics    TUBAL LIGATION            PE:   Vitals:    12/11/24 2229 12/11/24 2230   BP:  139/95   Pulse: 84    Resp: 20    Temp: 97.9 °F (36.6 °C)    TempSrc: Temporal    SpO2: 95%          Constitutional: Vital signs are normal. She appears well-developed. She is cooperative. No distress.   HENT:   Mouth/Throat: Uvula is midline, oropharynx is clear and moist and mucous membranes are normal.   Eyes: Pupils are equal, round, and reactive to light. Conjunctivae and EOM are normal.   Neck: Trachea normal. No thyroid mass and no thyromegaly present.   Cardiovascular: Normal rate, regular rhythm, normal heart sounds.   No murmur heard.  Pulmonary/Chest: Effort normal and breath sounds normal.   Abdominal: Soft. Normal appearance and bowel sounds are normal. There is no tenderness. There is no rebound, no guarding.   Neurological: She is alert.   Skin: Skin is warm, dry and intact.   Psychiatric: She has a normal mood and affect. Her speech is normal and behavior is normal. Thought content normal.          - 13 point ROS was performed and all are normal unless stated in the history above.   - Nursing note reviewed. Vitals reviewed.   - Orders placed by myself and/or advanced practitioner / resident.    - Previous chart was reviewed  - No language barrier.   - History obtained from patient.   - There are no limitations to the history obtained. Reasons ROS could not be obtained:  N/A         Medications   albuterol (PROVENTIL HFA,VENTOLIN HFA) inhaler 2 puff (2 puffs Inhalation Given 12/11/24 2307)   acetaminophen (TYLENOL) tablet 650 mg (650 mg Oral Given 12/11/24 2307)   ketorolac (TORADOL) injection 15 mg (15 mg Intramuscular Given 12/11/24 2307)     XR chest 2 views   ED Interpretation   No acute cardiopulmonary disease as interpreted by myself.        Orders Placed This Encounter   Procedures    XR chest 2 views     Labs Reviewed - No data to display  Time reflects when diagnosis was documented in both MDM as applicable and the Disposition within  this note       Time User Action Codes Description Comment    12/12/2024 12:28 AM Trino Ray Asif [J06.9] Upper respiratory infection           ED Disposition       ED Disposition   Discharge    Condition   Stable    Date/Time   u Dec 12, 2024 12:28 AM    Comment   Cindy Bolden discharge to home/self care.                   Follow-up Information       Follow up With Specialties Details Why Contact Info Additional Information    [facility] Emergency Department Emergency Medicine Go to  If symptoms worsen [address] [address] [phone] [facility] Emergency Department, [address], [place], [address]   [phone]          Patient's Medications   Discharge Prescriptions    AZITHROMYCIN (ZITHROMAX) 250 MG TABLET    Take 2 tablets today then 1 tablet daily x 4 days       Start Date: 12/12/2024End Date: 12/16/2024       Order Dose: --       Quantity: 6 tablet    Refills: 0     No discharge procedures on file.  Prior to Admission Medications   Prescriptions Last Dose Informant Patient Reported? Taking?   BIOTIN PO   Yes No   Sig: Take by mouth in the morning   Cholecalciferol (VITAMIN D-3 PO)   Yes No   Sig: Take by mouth in the morning   Multiple Vitamin (multivitamin) capsule   Yes No   Sig: Take 1 capsule by mouth daily   acetaZOLAMIDE (DIAMOX) 250 mg tablet   No No   Sig: TAKE 2 TABLETS BY MOUTH EVERY MORNING   Patient not taking: Reported on 6/14/2024   budesonide-formoterol (Symbicort) 160-4.5 mcg/act inhaler   No No   Sig: INHALE 2 PUFFS 2 TIMES A DAY RINSE MOUTH AFTER USE. USE ADDITIONAL PUFF IF NEEDED DAILY.   enoxaparin (Lovenox) 40 mg/0.4 mL   No No   Sig: Inject 0.4 mL (40 mg total) under the skin Daily at 2am for 13 days Do not start before June 5, 2024.   ferrous sulfate 324 (65 Fe) mg   No No   Sig: Take 1 tablet (324 mg total) by mouth every other day   gabapentin (NEURONTIN) 100 mg capsule   No No   Sig:  "Take 1 capsule (100 mg total) by mouth 3 (three) times a day   Patient not taking: Reported on 10/9/2024   indomethacin (INDOCIN) 25 mg capsule   No No   Sig: TAKE 1 TO 2 CAPSULES BY MOUTH EVERY 8 HOURS AS NEEDED FOR SEVERE HEADACHE WITH FOOD   Patient not taking: Reported on 11/3/2023   omeprazole (PriLOSEC) 20 mg delayed release capsule   No No   Sig: Take 1 capsule (20 mg total) by mouth daily   oxyCODONE (Roxicodone) 5 immediate release tablet   No No   Sig: Take 1 tablet (5 mg total) by mouth every 4 (four) hours as needed for moderate pain Max Daily Amount: 30 mg Do not start before June 5, 2024.   Patient not taking: Reported on 6/14/2024   pantoprazole (PROTONIX) 40 mg tablet   No No   Sig: TAKE 1 TABLET BY MOUTH EVERY DAY   Patient not taking: Reported on 10/9/2024      Facility-Administered Medications: None       Portions of the record may have been created with voice recognition software. Occasional wrong word or \"sound a like\" substitutions may have occurred due to the inherent limitations of voice recognition software. Read the chart carefully and recognize, using context, where substitutions have occurred.       ED Course         Critical Care Time  Procedures      "

## 2024-12-12 NOTE — ED PROVIDER NOTES
Time reflects when diagnosis was documented in both MDM as applicable and the Disposition within this note       Time User Action Codes Description Comment    12/12/2024 12:28 AM Trino Ray [J06.9] Upper respiratory infection           ED Disposition       ED Disposition   Discharge    Condition   Stable    Date/Time   Thu Dec 12, 2024 12:28 AM    Comment   Cindy Bolden discharge to home/self care.                   Assessment & Plan       Medical Decision Making  Patient 39-year-old female presenting for URI symptoms.  DDx: Viral versus bacterial pneumonia  Based on patient presentation and physical exam findings, primary concerns are viral versus bacterial pneumonia.  Plan for x-ray and symptomatic treatment.  X-ray negative for any acute findings however given daughter was just diagnosed with pneumonia will cover for pneumonia with [facility].  Prescription sent to pharmacy.  Return precautions given.  Patient understands and agrees.  Ready for discharge.    Problems Addressed:  Upper respiratory infection: acute illness or injury    Amount and/or Complexity of Data Reviewed  Radiology: ordered and independent interpretation performed.    Risk  OTC drugs.  Prescription drug management.             Medications   albuterol (PROVENTIL HFA,VENTOLIN HFA) inhaler 2 puff (2 puffs Inhalation Given 12/11/24 2307)   acetaminophen (TYLENOL) tablet 650 mg (650 mg Oral Given 12/11/24 2307)   ketorolac (TORADOL) injection 15 mg (15 mg Intramuscular Given 12/11/24 2307)       ED Risk Strat Scores                          SBIRT 22yo+      Flowsheet Row Most Recent Value   Initial Alcohol Screen: US AUDIT-C     1. How often do you have a drink containing alcohol? 0 Filed at: 12/11/2024 2231   2. How many drinks containing alcohol do you have on a typical day you are drinking?  0 Filed at: 12/11/2024 2231   3b. FEMALE Any Age, or MALE 65+: How often do you have 4 or more drinks on one occassion? 0 Filed at: 12/11/2024     Audit-C Score 0 Filed at: 2024   LETY: How many times in the past year have you...    Used an illegal drug or used a prescription medication for non-medical reasons? Never Filed at: 2024                            History of Present Illness       Chief Complaint   Patient presents with    Cough     Pt c/o cough and fevers. Pt states she has been sick the last three days and is coughing up green mucous today.        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      Past Surgical History:   Procedure Laterality Date     SECTION      EGD      FL LUMBAR PUNCTURE DIAGNOSTIC  2022    KS LAPS GSTRC RSTRICTIV PX LONGITUDINAL GASTRECTOMY N/A 2024    Procedure: ROBOTIC GASTRECTOMY SLEEVE, INTRAOP EGD;  Surgeon: Renzo Zhao MD;  Location: [facility];  Service: Bariatrics    TUBAL LIGATION        Family History   Problem Relation Age of Onset    Arthritis Mother     Fibromyalgia Mother     Rheum arthritis Mother     No Known Problems Sister     No Known Problems Daughter     No Known Problems Daughter     No Known Problems Daughter     No Known Problems Daughter     Breast cancer Maternal Grandmother     Breast cancer Maternal Aunt     No Known Problems Other     Diabetes Family     Cancer Family     Heart disease Family     Anesthesia problems Neg Hx       Social History     Tobacco Use    Smoking status: Never    Smokeless tobacco: Never    Tobacco comments:     Never a smoker or use of tobacco products per pt    Vaping Use    Vaping status: Never Used   Substance Use Topics    Alcohol use: Not Currently     Comment: occasional use- avg use couple times a month    Drug use: No     Comment: Denies any drug use per pt      E-Cigarette/Vaping    E-Cigarette Use Never User     Comments  Denies any use per pt       E-Cigarette/Vaping Substances    Nicotine No     THC No     CBD No     Flavoring No     Other No     Unknown No       I have reviewed and agree with the history as documented.     HPI  Patient is 39-year-old female presenting for URI symptoms.Past medical history significant for asthma.  Patient's daughters both are in the ER currently for similar symptoms.  Endorses cough congestion fever chills for the past several days.  Denies any nausea vomiting.  No shortness of breath.  Review of Systems   Constitutional:  Positive for chills and fever.   HENT:  Positive for congestion and rhinorrhea.    Eyes: Negative.    Respiratory:  Positive for cough.    Cardiovascular: Negative.    Gastrointestinal: Negative.    Endocrine: Negative.    Genitourinary: Negative.    Musculoskeletal: Negative.    Skin: Negative.    Allergic/Immunologic: Negative.    Neurological: Negative.    Hematological: Negative.    Psychiatric/Behavioral: Negative.     All other systems reviewed and are negative.          Objective       ED Triage Vitals   Temperature Pulse Blood Pressure Respirations SpO2 Patient Position - Orthostatic VS   12/11/24 2229 12/11/24 2229 12/11/24 2230 12/11/24 2229 12/11/24 2229 12/11/24 2229   97.9 °F (36.6 °C) 84 139/95 20 95 % Sitting      Temp Source Heart Rate Source BP Location FiO2 (%) Pain Score    12/11/24 2229 12/11/24 2229 12/11/24 2229 -- 12/11/24 2307    Temporal Monitor Left arm  Med Not Given for Pain - for MAR use only      Vitals      Date and Time Temp Pulse SpO2 Resp BP Pain Score FACES Pain Rating User   12/11/24 2307 -- -- -- -- -- Med Not Given for Pain - for MAR use only -- GH   12/11/24 2230 -- -- -- -- 139/95 -- -- KH   12/11/24 2229 97.9 °F (36.6 °C) 84 95 % 20 -- -- --             Physical Exam  Vitals and nursing note reviewed.   Constitutional:       Appearance: Normal appearance. She is normal weight.   HENT:      Head: Normocephalic and atraumatic.      Right  Ear: Tympanic membrane, ear canal and external ear normal.      Left Ear: Tympanic membrane, ear canal and external ear normal.      Nose: Nose normal.      Mouth/Throat:      Mouth: Mucous membranes are moist.      Pharynx: Oropharynx is clear.   Eyes:      Extraocular Movements: Extraocular movements intact.      Conjunctiva/sclera: Conjunctivae normal.      Pupils: Pupils are equal, round, and reactive to light.   Cardiovascular:      Rate and Rhythm: Normal rate and regular rhythm.      Pulses: Normal pulses.      Heart sounds: Normal heart sounds.   Pulmonary:      Effort: Pulmonary effort is normal.      Breath sounds: Normal breath sounds. No wheezing, rhonchi or rales.   Abdominal:      General: Abdomen is flat. Bowel sounds are normal.      Palpations: Abdomen is soft.   Musculoskeletal:         General: Normal range of motion.      Cervical back: Normal range of motion and neck supple.   Skin:     General: Skin is warm and dry.      Capillary Refill: Capillary refill takes less than 2 seconds.   Neurological:      General: No focal deficit present.      Mental Status: She is alert and oriented to person, place, and time.   Psychiatric:         Mood and Affect: Mood normal.         Behavior: Behavior normal.         Thought Content: Thought content normal.         Judgment: Judgment normal.         Results Reviewed       None            XR chest 2 views   ED Interpretation by Marck Nguyne MD (12/12 0009)   No acute cardiopulmonary disease as interpreted by myself.          Procedures    ED Medication and Procedure Management   Prior to Admission Medications   Prescriptions Last Dose Informant Patient Reported? Taking?   BIOTIN PO   Yes No   Sig: Take by mouth in the morning   Cholecalciferol (VITAMIN D-3 PO)   Yes No   Sig: Take by mouth in the morning   Multiple Vitamin (multivitamin) capsule   Yes No   Sig: Take 1 capsule by mouth daily   acetaZOLAMIDE (DIAMOX) 250 mg tablet   No No   Sig: TAKE 2  TABLETS BY MOUTH EVERY MORNING   Patient not taking: Reported on 6/14/2024   budesonide-formoterol (Symbicort) 160-4.5 mcg/act inhaler   No No   Sig: INHALE 2 PUFFS 2 TIMES A DAY RINSE MOUTH AFTER USE. USE ADDITIONAL PUFF IF NEEDED DAILY.   enoxaparin (Lovenox) 40 mg/0.4 mL   No No   Sig: Inject 0.4 mL (40 mg total) under the skin Daily at 2am for 13 days Do not start before June 5, 2024.   ferrous sulfate 324 (65 Fe) mg   No No   Sig: Take 1 tablet (324 mg total) by mouth every other day   gabapentin (NEURONTIN) 100 mg capsule   No No   Sig: Take 1 capsule (100 mg total) by mouth 3 (three) times a day   Patient not taking: Reported on 10/9/2024   indomethacin (INDOCIN) 25 mg capsule   No No   Sig: TAKE 1 TO 2 CAPSULES BY MOUTH EVERY 8 HOURS AS NEEDED FOR SEVERE HEADACHE WITH FOOD   Patient not taking: Reported on 11/3/2023   omeprazole (PriLOSEC) 20 mg delayed release capsule   No No   Sig: Take 1 capsule (20 mg total) by mouth daily   oxyCODONE (Roxicodone) 5 immediate release tablet   No No   Sig: Take 1 tablet (5 mg total) by mouth every 4 (four) hours as needed for moderate pain Max Daily Amount: 30 mg Do not start before June 5, 2024.   Patient not taking: Reported on 6/14/2024   pantoprazole (PROTONIX) 40 mg tablet   No No   Sig: TAKE 1 TABLET BY MOUTH EVERY DAY   Patient not taking: Reported on 10/9/2024      Facility-Administered Medications: None     Discharge Medication List as of 12/12/2024 12:30 AM        START taking these medications    Details   azithromycin (ZITHROMAX) 250 mg tablet Take 2 tablets today then 1 tablet daily x 4 days, Normal           CONTINUE these medications which have NOT CHANGED    Details   acetaZOLAMIDE (DIAMOX) 250 mg tablet TAKE 2 TABLETS BY MOUTH EVERY MORNING, Normal      BIOTIN PO Take by mouth in the morning, Historical Med      budesonide-formoterol (Symbicort) 160-4.5 mcg/act inhaler INHALE 2 PUFFS 2 TIMES A DAY RINSE MOUTH AFTER USE. USE ADDITIONAL PUFF IF NEEDED  DAILY., Normal      Cholecalciferol (VITAMIN D-3 PO) Take by mouth in the morning, Historical Med      enoxaparin (Lovenox) 40 mg/0.4 mL Inject 0.4 mL (40 mg total) under the skin Daily at 2am for 13 days Do not start before June 5, 2024., Starting Wed 6/5/2024, Until Tue 6/18/2024, Normal      ferrous sulfate 324 (65 Fe) mg Take 1 tablet (324 mg total) by mouth every other day, Starting Wed 9/29/2021, Normal      gabapentin (NEURONTIN) 100 mg capsule Take 1 capsule (100 mg total) by mouth 3 (three) times a day, Starting Mon 1/9/2023, Normal      indomethacin (INDOCIN) 25 mg capsule TAKE 1 TO 2 CAPSULES BY MOUTH EVERY 8 HOURS AS NEEDED FOR SEVERE HEADACHE WITH FOOD, Normal      Multiple Vitamin (multivitamin) capsule Take 1 capsule by mouth daily, Historical Med      omeprazole (PriLOSEC) 20 mg delayed release capsule Take 1 capsule (20 mg total) by mouth daily, Starting Fri 5/24/2024, Normal      oxyCODONE (Roxicodone) 5 immediate release tablet Take 1 tablet (5 mg total) by mouth every 4 (four) hours as needed for moderate pain Max Daily Amount: 30 mg Do not start before June 5, 2024., Starting Wed 6/5/2024, Normal      pantoprazole (PROTONIX) 40 mg tablet TAKE 1 TABLET BY MOUTH EVERY DAY, Normal           No discharge procedures on file.  ED SEPSIS DOCUMENTATION   Time reflects when diagnosis was documented in both MDM as applicable and the Disposition within this note       Time User Action Codes Description Comment    12/12/2024 12:28 AM Trino Ray [J06.9] Upper respiratory infection                  Trino Ray MD  12/12/24 0257

## 2025-01-07 ENCOUNTER — TELEPHONE (OUTPATIENT)
Dept: BARIATRICS | Facility: CLINIC | Age: 40
End: 2025-01-07

## 2025-01-07 NOTE — TELEPHONE ENCOUNTER
Called patient and left a message that the appointment on 1/16/25 with Jose Enrique Clarke PA-C was cancelled due to her being seen on 1/10/25 with Rupinder Arrieta PA-C per Jose Enrique Clarke PA-C.

## 2025-01-10 ENCOUNTER — TELEPHONE (OUTPATIENT)
Age: 40
End: 2025-01-10

## 2025-01-10 NOTE — TELEPHONE ENCOUNTER
Patient was rescheduled to 1/17 with Pinky Cummins scheduled for 10 am pickup; patient aware.  Patient informed only 5 rides per year allowed.

## 2025-01-10 NOTE — TELEPHONE ENCOUNTER
Patient called in because she confirmed her appt yesterday and that her lyft ride was set up. Lyft was set up and set to  at 10am however as of now at 10:24 patient has not been picked up. Spoke with Maritza at the office who is going to look into this and will call patient back.

## 2025-01-17 NOTE — TELEPHONE ENCOUNTER
Patient called in to cancel appt with Rupinder today, she has to work.  She had a Lyft scheduled for a 10:00 , please cancel the Lyft.  Thanks.

## 2025-01-17 NOTE — TELEPHONE ENCOUNTER
LVM to inform patient that lyft was cancelled for 1/17. Left office info to call back to R/S 3rd po [phone]

## 2025-01-22 ENCOUNTER — TELEPHONE (OUTPATIENT)
Dept: INTERNAL MEDICINE CLINIC | Facility: CLINIC | Age: 40
End: 2025-01-22

## 2025-04-17 ENCOUNTER — TELEPHONE (OUTPATIENT)
Dept: INTERNAL MEDICINE CLINIC | Facility: CLINIC | Age: 40
End: 2025-04-17

## 2025-04-18 DIAGNOSIS — J45.40 MODERATE PERSISTENT ASTHMA WITHOUT COMPLICATION: ICD-10-CM

## 2025-04-18 RX ORDER — BUDESONIDE AND FORMOTEROL FUMARATE DIHYDRATE 160; 4.5 UG/1; UG/1
AEROSOL RESPIRATORY (INHALATION)
Qty: 18 G | Refills: 3 | Status: SHIPPED | OUTPATIENT
Start: 2025-04-18

## 2025-05-14 ENCOUNTER — TELEPHONE (OUTPATIENT)
Dept: INTERNAL MEDICINE CLINIC | Facility: CLINIC | Age: 40
End: 2025-05-14

## 2025-05-28 ENCOUNTER — OFFICE VISIT (OUTPATIENT)
Dept: NEUROLOGY | Facility: CLINIC | Age: 40
End: 2025-05-28
Payer: COMMERCIAL

## 2025-05-28 VITALS
SYSTOLIC BLOOD PRESSURE: 118 MMHG | HEIGHT: 63 IN | BODY MASS INDEX: 45.36 KG/M2 | DIASTOLIC BLOOD PRESSURE: 80 MMHG | WEIGHT: 256 LBS

## 2025-05-28 DIAGNOSIS — G93.2 IDIOPATHIC INTRACRANIAL HYPERTENSION: ICD-10-CM

## 2025-05-28 DIAGNOSIS — G43.719 INTRACTABLE CHRONIC MIGRAINE WITHOUT AURA AND WITHOUT STATUS MIGRAINOSUS: Primary | ICD-10-CM

## 2025-05-28 DIAGNOSIS — R51.9 CHRONIC INTRACTABLE HEADACHE: ICD-10-CM

## 2025-05-28 DIAGNOSIS — Z82.49 FAMILY HISTORY OF BRAIN ANEURYSM: ICD-10-CM

## 2025-05-28 DIAGNOSIS — G89.29 CHRONIC INTRACTABLE HEADACHE: ICD-10-CM

## 2025-05-28 PROCEDURE — 99213 OFFICE O/P EST LOW 20 MIN: CPT | Performed by: PHYSICIAN ASSISTANT

## 2025-05-28 RX ORDER — LANOLIN ALCOHOL/MO/W.PET/CERES
CREAM (GRAM) TOPICAL
Qty: 30 TABLET | Refills: 5 | Status: SHIPPED | OUTPATIENT
Start: 2025-05-28

## 2025-05-28 NOTE — PROGRESS NOTES
Name: Cindy Bolden      : 1985      MRN: [id number]  Encounter Provider: Sandra Forman PA-C  Encounter Date: 2025   Encounter department: [facility]  :  Assessment & Plan  Intractable chronic migraine without aura and without status migrainosus  Retry magnesium for prevention.  No longer needing Diamox at this time since weight loss has improved some of her migraines.  Likes to continue Tylenol as needed.  Orders:    magnesium Oxide (MAG-OX) 400 mg TABS; One tab daily    Ambulatory Referral to Ophthalmology; Future    Family history of brain aneurysm  We will consider repeat CTA head in the future, study was ordered but not done yet.         Idiopathic intracranial hypertension    Orders:    Ambulatory Referral to Ophthalmology; Future    Chronic intractable headache           There are no Patient Instructions on file for this visit.     The patient should not hesitate to call me prior to her follow up with any questions or concerns.      History of Present Illness   HPI migraine follow up, migraines have gotten better, not as severe. Hasn't taken medications since having her gastric sleeve done.  Gastric sleeve, lost about 80 lbs- last     HAs/ migraines are about 2x per week, back of the head, reduced headaches with weight loss thankfully.  Tylenol helps.  She does have diplopia and blurry vision with the headache.  She still has not seen an eye doctor and is willing to do so.    Prior notes: No crazy headaches, did start Ozempic and had a headache for 3 days, they did wear off. She is in the process of getting the gastric sleeve and needs clearance for her surgery.     Since I last saw her in  she has been feeling better.  She has not had severe headaches or migraines, except for 3 days in a row right after starting Ozempic injection for weight loss.  She has been on the injection for 5 weeks now.  After 3 days the headaches stopped and she feels better.  She  "has not had recurrent headaches since she got used to the new med.  She is in the process of getting approved for the gastric sleeve surgery and needs clearance.  She denies any vision issues such as double vision, no blurry vision or eye pain.  She denies headaches with cough or sneeze or bending over.  She denies any new neurologic problems at this time and no focal deficits.     On questioning she stopped Topamax a while ago because she thought it may be causing right sided stomach/abdominal pain.  The pain did go away when she stopped it.  She also stopped Diamox for a while but her PCP put her back on it for \"water weight.\"  She states it did help a little bit with this.  She takes 500 mg every morning and she does not have abdominal pain with it.     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     Prior notes:  CTA head with and without contrast was ordered at the last visit but denied by insurance.  Was ordered for a family history of brain aneurysms.  Will reconsider ordering this in the future.       The patient had a lumbar puncture 4/19/2022 with an opening pressure of 26, closing pressure 12.  She reports reduction of migraine headaches after this procedure.  Initially however she had headaches for 3 days following the procedure which was likely a low-pressure headache.  She now feels her headaches are more manageable and tolerable.  She can function better at work.  She is not missing any events because of her headaches.  She denies vision changes.  Work and stress seem to be the biggest triggers for her.  However she is not overusing Advil.  She tolerates the headaches without taking anything.     She does describe some hair loss but it is not significant.       She is interested in trying to lose weight in the future.     Migraines:  Current pain: 0/10  Reaches pain level at worst: 10/10  Frequency:  As of 4/18/24: noted above  As of " "7/8/22: Daily low grade headache, less severe and tolerable with the current regimen; she is happier and able to function better  Location:  Occipital, R>L, sometimes R parietal, eyes  Quality:  Pressure, sharp  Associated with: nausea, photophobia, phonophobia, prefer quiet dark room, dizziness, blurry vision, dots in the visual fields, neck pain, sore/ stiff neck     Triggers: stress, especially at work, weather changes/range, using the computer too much     Aura/ warning:  She gets some blurry vision and dots in her visual fields as noted above, but no scintillating scotomas.  The headache just comes on or worsens without warning.     Medications tried:  Prevention-  Diamox- second time she tried it she did not have s/e  topamax- if takes this daily it brings the headache down to a tolerable level  Flexeril  Riboflavin     Abortive-  Tylenol  Naproxen  advil     Other non-medication therapies or treatments-     Prior note 4/8/22:  Migraine headaches which have been worsening since COVID infection in November 2020.     She holds additional diagnoses of right flank pain but no radiographic evidence of kidney stones, pre diabetes, mild intermittent asthma without complication.     I see mention of headaches by her PCP at the visit on 10/12/2020, and the patient was given Flexeril and riboflavin which did reduce the headaches thankfully.     She also states that she gained approximately 60 lb over the last several months and she is determined to lose weight.  Per PCP's note in November 2020 the \"patient has experienced an 80.8 lb weight gain over the course of 8 months.\"     She then tested + for COVID 11/24/2020. Headaches worsened since then and did not improve.  The patient describes her COVID infection as a constant fever for 2 weeks, shortness of breath, difficulty breathing, went to the hospital once for it.  She also had chills, aches and pains of the muscles and joints, as well as worsening migraine " "headaches.  The other infectious symptoms improved but the headaches persisted.     She is an  for [employer].  There is a lot of stress and a lot of changes going on with her work, so this seems like it could be a trigger for her migraines.     Family hx of migraines: unsure  Family hx of cerebral aneurysms: strong faimly history of brain aneurysm including paternal aunt who had an aneurysm rupture; mom's side and maternal GGM (on both sides) as well with brain aneurysms     Diagnostics:  Brain MRI w/o contrast 3/10/21: \"Prominent CSF within the optic nerve sheaths identified with prominent partially of the sella turcica.  In the setting of headache, consideration for idiopathic intracranial hypertension (pseudotumor cerebri) should be considered. Diffusely hypointense marrow signal in the T1 sequence identified.  This is nonspecific however can be seen in the setting of anemia, tobacco abuse, or other marrow infiltrating processes.\"      Review of Systems   Constitutional:  Negative for appetite change, fatigue and fever.   HENT: Negative.  Negative for hearing loss, tinnitus, trouble swallowing and voice change.    Eyes: Negative.  Negative for photophobia, pain and visual disturbance.   Respiratory: Negative.  Negative for shortness of breath.    Cardiovascular: Negative.  Negative for palpitations.   Gastrointestinal: Negative.  Negative for nausea and vomiting.   Endocrine: Negative.  Negative for cold intolerance.   Genitourinary: Negative.  Negative for dysuria, frequency and urgency.   Musculoskeletal:  Negative for back pain, gait problem, myalgias, neck pain and neck stiffness.   Skin: Negative.  Negative for rash.   Allergic/Immunologic: Negative.    Neurological:  Positive for headaches. Negative for dizziness, tremors, seizures, syncope, facial asymmetry, speech difficulty, weakness, light-headedness and numbness.   Hematological: Negative.  Does not bruise/bleed easily. " "  Psychiatric/Behavioral: Negative.  Negative for confusion, hallucinations and sleep disturbance.     I have personally reviewed the MA's review of systems and made changes as necessary.    Pertinent Medical History           Medical History Reviewed by provider this encounter:  Tobacco  Allergies  Meds  Problems  Med Hx  Surg Hx  Fam Hx     .  Past Medical History   Past Medical History[1]  Past Surgical History[2]  Family History[3]   reports that she has never smoked. She has never used smokeless tobacco. She reports that she does not currently use alcohol. She reports that she does not use drugs.  Current Outpatient Medications   Medication Instructions    acetaZOLAMIDE (DIAMOX) 250 mg tablet TAKE 2 TABLETS BY MOUTH EVERY MORNING    BIOTIN PO Daily    budesonide-formoterol (Symbicort) 160-4.5 mcg/act inhaler INHALE 2 PUFFS 2 TIMES A DAY RINSE MOUTH AFTER USE. USE ADDITIONAL PUFF IF NEEDED DAILY.    Cholecalciferol (VITAMIN D-3 PO) Daily    enoxaparin (LOVENOX) 40 mg, Subcutaneous, Daily  (0200)    ferrous sulfate 324 mg, Oral, Every other day    gabapentin (NEURONTIN) 100 mg, Oral, 3 times daily    indomethacin (INDOCIN) 25 mg capsule TAKE 1 TO 2 CAPSULES BY MOUTH EVERY 8 HOURS AS NEEDED FOR SEVERE HEADACHE WITH FOOD    magnesium Oxide (MAG-OX) 400 mg TABS One tab daily    Multiple Vitamin (multivitamin) capsule 1 capsule, Daily    omeprazole (PRILOSEC) 20 mg, Oral, Daily    oxyCODONE (ROXICODONE) 5 mg, Oral, Every 4 hours PRN    pantoprazole (PROTONIX) 40 mg tablet TAKE 1 TABLET BY MOUTH EVERY DAY   Allergies[4]   Medications Ordered Prior to Encounter[5]   Social History[6]     Objective   /80 (BP Location: Right arm, Patient Position: Sitting, Cuff Size: Large)   Ht 5' 3\" (1.6 m)   Wt 116 kg (256 lb)   BMI 45.35 kg/m²     Physical Exam  Neurological Exam  On neurologic exam, the patient is alert and oriented to time and place. Speech is fluent and articulate, and the patient follows commands " appropriately. Judgment and affect appear normal. Pupils are equally round and reactive to light and extraocular muscles are intact without nystagmus. Face is symmetric, and tongue, uvula, and palate are midline. Hearing is intact. Motor examination reveals intact strength throughout. Neck flexors 5/5. Normal gait is steady.      Administrative Statements   I have spent a total time of 20 minutes in caring for this patient on the day of the visit/encounter including Diagnostic results, Risks and benefits of tx options, Instructions for management, Patient and family education, Importance of tx compliance, Risk factor reductions, Impressions, Counseling / Coordination of care, Documenting in the medical record, Reviewing/placing orders in the medical record (including tests, medications, and/or procedures), and Obtaining or reviewing history  7.         [1]   Past Medical History:  Diagnosis Date    Anemia     Asthma     Eczema     LEFT shin area per pt    GERD (gastroesophageal reflux disease)     pantoprazole prn per pt    Obese     gastric sleeve today 2024    Pseudotumor     in head   [2]   Past Surgical History:  Procedure Laterality Date     SECTION      EGD      FL LUMBAR PUNCTURE DIAGNOSTIC  2022    GA LAPS GSTRC RSTRICTIV PX LONGITUDINAL GASTRECTOMY N/A 2024    Procedure: ROBOTIC GASTRECTOMY SLEEVE, INTRAOP EGD;  Surgeon: Renzo Zhao MD;  Location: [facility];  Service: Bariatrics    TUBAL LIGATION     [3]   Family History  Problem Relation Name Age of Onset    Arthritis Mother      Fibromyalgia Mother      Rheum arthritis Mother      No Known Problems Sister      No Known Problems Daughter      No Known Problems Daughter      No Known Problems Daughter      No Known Problems Daughter      Breast cancer Maternal Grandmother      Breast cancer Maternal Aunt meron     No Known Problems Other Child     Diabetes Family      Cancer Family      Heart disease Family      Anesthesia  problems Neg Hx     [4] No Known Allergies  [5]   Current Outpatient Medications on File Prior to Visit   Medication Sig Dispense Refill    BIOTIN PO Take by mouth in the morning      budesonide-formoterol (Symbicort) 160-4.5 mcg/act inhaler INHALE 2 PUFFS 2 TIMES A DAY RINSE MOUTH AFTER USE. USE ADDITIONAL PUFF IF NEEDED DAILY. 18 g 3    Cholecalciferol (VITAMIN D-3 PO) Take by mouth in the morning      enoxaparin (Lovenox) 40 mg/0.4 mL Inject 0.4 mL (40 mg total) under the skin Daily at 2am for 13 days Do not start before June 5, 2024. 5.2 mL 0    ferrous sulfate 324 (65 Fe) mg Take 1 tablet (324 mg total) by mouth every other day 30 tablet 3    Multiple Vitamin (multivitamin) capsule Take 1 capsule by mouth in the morning.      omeprazole (PriLOSEC) 20 mg delayed release capsule Take 1 capsule (20 mg total) by mouth daily 90 capsule 1    acetaZOLAMIDE (DIAMOX) 250 mg tablet TAKE 2 TABLETS BY MOUTH EVERY MORNING (Patient not taking: Reported on 5/28/2025) 60 tablet 5    gabapentin (NEURONTIN) 100 mg capsule Take 1 capsule (100 mg total) by mouth 3 (three) times a day (Patient not taking: Reported on 5/28/2025) 30 capsule 2    indomethacin (INDOCIN) 25 mg capsule TAKE 1 TO 2 CAPSULES BY MOUTH EVERY 8 HOURS AS NEEDED FOR SEVERE HEADACHE WITH FOOD (Patient not taking: Reported on 5/28/2025) 15 capsule 1    oxyCODONE (Roxicodone) 5 immediate release tablet Take 1 tablet (5 mg total) by mouth every 4 (four) hours as needed for moderate pain Max Daily Amount: 30 mg Do not start before June 5, 2024. (Patient not taking: Reported on 6/14/2024) 5 tablet 0    pantoprazole (PROTONIX) 40 mg tablet TAKE 1 TABLET BY MOUTH EVERY DAY (Patient not taking: Reported on 5/28/2025) 90 tablet 3     No current facility-administered medications on file prior to visit.   [6]   Social History  Tobacco Use    Smoking status: Never    Smokeless tobacco: Never    Tobacco comments:     Never a smoker or use of tobacco products per pt    Vaping  Use    Vaping status: Never Used   Substance and Sexual Activity    Alcohol use: Not Currently     Comment: occasional use- avg use couple times a month    Drug use: No     Comment: Denies any drug use per pt    Sexual activity: Yes     Comment: Denies any chest pain or shortness of breath with activity

## 2025-06-01 NOTE — ASSESSMENT & PLAN NOTE
Retry magnesium for prevention.  No longer needing Diamox at this time since weight loss has improved some of her migraines.  Likes to continue Tylenol as needed.  Orders:    magnesium Oxide (MAG-OX) 400 mg TABS; One tab daily    Ambulatory Referral to Ophthalmology; Future

## 2025-07-28 ENCOUNTER — VBI (OUTPATIENT)
Dept: ADMINISTRATIVE | Facility: OTHER | Age: 40
End: 2025-07-28

## 2025-07-31 ENCOUNTER — VBI (OUTPATIENT)
Dept: ADMINISTRATIVE | Facility: OTHER | Age: 40
End: 2025-07-31

## 2025-08-21 ENCOUNTER — TELEPHONE (OUTPATIENT)
Age: 40
End: 2025-08-21

## (undated) DEVICE — VISIGI 3D®  CALIBRATION SYSTEM  SIZE 36FR SLEEVE/STD: Brand: BOEHRINGER® VISIGI 3D™ SLEEVE GASTRECTOMY CALIBRATION SYSTEM, SIZE 36FR

## (undated) DEVICE — 2000CC GUARDIAN II: Brand: GUARDIAN

## (undated) DEVICE — DECANTER: Brand: UNBRANDED

## (undated) DEVICE — TROCAR: Brand: KII FIOS FIRST ENTRY

## (undated) DEVICE — BLADELESS OBTURATOR: Brand: WECK VISTA

## (undated) DEVICE — GLOVE SRG BIOGEL 8

## (undated) DEVICE — DISPOSABLE OR TOWEL: Brand: CARDINAL HEALTH

## (undated) DEVICE — 3000CC GUARDIAN II: Brand: GUARDIAN

## (undated) DEVICE — VIOLET BRAIDED (POLYGLACTIN 910), SYNTHETIC ABSORBABLE SUTURE: Brand: COATED VICRYL

## (undated) DEVICE — ARM DRAPE

## (undated) DEVICE — TUBING SMOKE EVAC W/FILTRATION DEVICE PLUMEPORT ACTIV

## (undated) DEVICE — SUT MONOCRYL 4-0 PS-2 27 IN Y426H

## (undated) DEVICE — TROCARS: Brand: KII® OPTICAL ACCESS SYSTEM

## (undated) DEVICE — KIT, ROBOTIC BARIATRIC: Brand: CARDINAL HEALTH

## (undated) DEVICE — SEAL

## (undated) DEVICE — Device: Brand: DEFENDO AIR/WATER/SUCTION AND BIOPSY VALVE

## (undated) DEVICE — CADIERE FORCEPS: Brand: ENDOWRIST

## (undated) DEVICE — INSUFFLATION NEEDLE TO ESTABLISH PNEUMOPERITONEUM.: Brand: INSUFFLATION NEEDLE

## (undated) DEVICE — BLACK INTELLIGENT RELOAD: Brand: TRI-STAPLE 2.0

## (undated) DEVICE — [HIGH FLOW INSUFFLATOR,  DO NOT USE IF PACKAGE IS DAMAGED,  KEEP DRY,  KEEP AWAY FROM SUNLIGHT,  PROTECT FROM HEAT AND RADIOACTIVE SOURCES.]: Brand: PNEUMOSURE

## (undated) DEVICE — CANNULA SEAL

## (undated) DEVICE — REDUCER: Brand: ENDOWRIST

## (undated) DEVICE — SCD SEQUENTIAL COMPRESSION COMFORT SLEEVE MEDIUM KNEE LENGTH: Brand: KENDALL SCD

## (undated) DEVICE — VESSEL SEALER EXTEND: Brand: ENDOWRIST

## (undated) DEVICE — VISUALIZATION SYSTEM: Brand: CLEARIFY

## (undated) DEVICE — ARTICULATING RELOAD WITH TRI-STAPLE TECHNOLOGY: Brand: ENDO GIA

## (undated) DEVICE — COLUMN DRAPE

## (undated) DEVICE — STAPLER 60: Brand: SUREFORM

## (undated) DEVICE — PLUMEPEN PRO 10FT